# Patient Record
Sex: FEMALE | Race: WHITE | NOT HISPANIC OR LATINO | Employment: UNEMPLOYED | URBAN - METROPOLITAN AREA
[De-identification: names, ages, dates, MRNs, and addresses within clinical notes are randomized per-mention and may not be internally consistent; named-entity substitution may affect disease eponyms.]

---

## 2023-07-24 LAB
ESTRADIOL (PG/ML) IN SER/PLAS: 274 PG/ML
FOLLITROPIN (IU/L) IN SER/PLAS: 8.3 IU/L
THYROTROPIN (MIU/L) IN SER/PLAS BY DETECTION LIMIT <= 0.05 MIU/L: 1.39 MIU/L (ref 0.44–3.98)

## 2023-07-26 LAB
CHLAMYDIA TRACH., AMPLIFIED: NEGATIVE
N. GONORRHEA, AMPLIFIED: NEGATIVE

## 2023-08-07 ENCOUNTER — HOSPITAL ENCOUNTER (OUTPATIENT)
Dept: DATA CONVERSION | Facility: HOSPITAL | Age: 53
End: 2023-08-07
Attending: PHYSICAL MEDICINE & REHABILITATION | Admitting: PHYSICAL MEDICINE & REHABILITATION
Payer: MEDICARE

## 2023-08-07 DIAGNOSIS — M46.1 SACROILIITIS, NOT ELSEWHERE CLASSIFIED (CMS-HCC): ICD-10-CM

## 2023-09-06 ENCOUNTER — HOSPITAL ENCOUNTER (OUTPATIENT)
Dept: DATA CONVERSION | Facility: HOSPITAL | Age: 53
End: 2023-09-06
Attending: RADIOLOGY | Admitting: RADIOLOGY
Payer: MEDICARE

## 2023-09-06 DIAGNOSIS — M47.816 SPONDYLOSIS WITHOUT MYELOPATHY OR RADICULOPATHY, LUMBAR REGION: ICD-10-CM

## 2023-09-06 DIAGNOSIS — J44.9 CHRONIC OBSTRUCTIVE PULMONARY DISEASE, UNSPECIFIED (MULTI): ICD-10-CM

## 2023-09-06 DIAGNOSIS — F17.200 NICOTINE DEPENDENCE, UNSPECIFIED, UNCOMPLICATED: ICD-10-CM

## 2023-09-06 DIAGNOSIS — N95.0 POSTMENOPAUSAL BLEEDING: ICD-10-CM

## 2023-09-06 DIAGNOSIS — F43.10 POST-TRAUMATIC STRESS DISORDER, UNSPECIFIED: ICD-10-CM

## 2023-09-06 DIAGNOSIS — N39.3 STRESS INCONTINENCE (FEMALE) (MALE): ICD-10-CM

## 2023-09-06 DIAGNOSIS — Z12.4 ENCOUNTER FOR SCREENING FOR MALIGNANT NEOPLASM OF CERVIX: ICD-10-CM

## 2023-09-27 ENCOUNTER — HOSPITAL ENCOUNTER (OUTPATIENT)
Dept: DATA CONVERSION | Facility: HOSPITAL | Age: 53
End: 2023-09-27
Attending: PHYSICAL MEDICINE & REHABILITATION | Admitting: PHYSICAL MEDICINE & REHABILITATION
Payer: MEDICARE

## 2023-09-27 DIAGNOSIS — M54.12 RADICULOPATHY, CERVICAL REGION: ICD-10-CM

## 2023-09-29 VITALS — BODY MASS INDEX: 25.74 KG/M2 | WEIGHT: 145.28 LBS | HEIGHT: 63 IN

## 2023-09-29 VITALS — BODY MASS INDEX: 25.74 KG/M2 | HEIGHT: 63 IN | WEIGHT: 145.28 LBS

## 2023-09-30 NOTE — H&P
"    History & Physical Reviewed:   Pregnant/Lactating:  ·  Are You Pregnant no (1)   ·  Are You Currently Breastfeeding no (1)     I have reviewed the History and Physical dated:  27-Sep-2023   History and Physical reviewed and relevant findings noted. Patient examined to review pertinent physical  findings.: No significant changes   Home Medications Reviewed: no changes noted   Allergies Reviewed: no changes noted       ERAS (Enhanced Recovery After Surgery):  ·  ERAS Patient: no     Consent:   COVID-19 Consent:  ·  COVID-19 Risk Consent Surgeon has reviewed key risks related to the risk of itzel COVID-19 and if they contract COVID-19 what the risks are.       Electronic Signatures:  Daniele Elder)  (Signed 27-Sep-2023 09:25)   Authored: History & Physical Reviewed, ERAS, Consent,  Note Completion      Last Updated: 27-Sep-2023 09:25 by Daniele Elder)    References:  1.  Data Referenced From \"Patient Profile - Preop v3\" 26-Sep-2023 13:12   "

## 2023-09-30 NOTE — H&P
"    History & Physical Reviewed:   Pregnant/Lactating:  ·  Are You Pregnant no (1)   ·  Are You Currently Breastfeeding no (1)     I have reviewed the History and Physical dated:  07-Aug-2023   History and Physical reviewed and relevant findings noted. Patient examined to review pertinent physical  findings.: No significant changes   Home Medications Reviewed: no changes noted   Allergies Reviewed: no changes noted       ERAS (Enhanced Recovery After Surgery):  ·  ERAS Patient: no     Consent:   COVID-19 Consent:  ·  COVID-19 Risk Consent Surgeon has reviewed key risks related to the risk of itzel COVID-19 and if they contract COVID-19 what the risks are.       Electronic Signatures:  Daniele Elder)  (Signed 07-Aug-2023 10:32)   Authored: History & Physical Reviewed, ERAS, Consent,  Note Completion      Last Updated: 07-Aug-2023 10:32 by Daniele Elder)    References:  1.  Data Referenced From \"Patient Profile - Preop v3\" 07-Aug-2023 10:04   "

## 2023-10-01 PROBLEM — M47.812 CERVICAL SPONDYLOSIS WITHOUT MYELOPATHY: Status: ACTIVE | Noted: 2023-10-01

## 2023-10-01 PROBLEM — R73.03 PREDIABETES: Status: ACTIVE | Noted: 2023-10-01

## 2023-10-01 PROBLEM — N95.1 PERIMENOPAUSE: Status: ACTIVE | Noted: 2023-10-01

## 2023-10-01 PROBLEM — M47.816 LUMBAR SPONDYLOSIS: Status: ACTIVE | Noted: 2023-10-01

## 2023-10-01 PROBLEM — R68.89 RECENT CHANGE IN WEIGHT: Status: ACTIVE | Noted: 2023-10-01

## 2023-10-01 PROBLEM — M76.62 ACHILLES TENDINITIS OF LEFT LOWER EXTREMITY: Status: ACTIVE | Noted: 2023-10-01

## 2023-10-01 PROBLEM — J44.9 COPD, MILD (MULTI): Status: ACTIVE | Noted: 2023-10-01

## 2023-10-01 PROBLEM — M51.37 DEGENERATION OF INTERVERTEBRAL DISC OF LUMBOSACRAL REGION: Status: ACTIVE | Noted: 2023-10-01

## 2023-10-01 PROBLEM — M79.18 MYOFASCIAL PAIN SYNDROME OF LUMBAR SPINE: Status: ACTIVE | Noted: 2023-10-01

## 2023-10-01 PROBLEM — N89.8 VAGINAL LESION: Status: ACTIVE | Noted: 2023-10-01

## 2023-10-01 PROBLEM — M51.379 DEGENERATION OF INTERVERTEBRAL DISC OF LUMBOSACRAL REGION: Status: ACTIVE | Noted: 2023-10-01

## 2023-10-01 PROBLEM — R05.3 CHRONIC COUGH: Status: ACTIVE | Noted: 2023-10-01

## 2023-10-01 PROBLEM — J38.3 LESION OF VOCAL CORD: Status: ACTIVE | Noted: 2023-10-01

## 2023-10-01 PROBLEM — M50.222 HERNIATED NUCLEUS PULPOSUS, C5-6: Status: ACTIVE | Noted: 2023-10-01

## 2023-10-01 PROBLEM — N92.6 MENSES, IRREGULAR: Status: ACTIVE | Noted: 2023-10-01

## 2023-10-01 PROBLEM — F43.10 PTSD (POST-TRAUMATIC STRESS DISORDER): Status: ACTIVE | Noted: 2023-10-01

## 2023-10-01 PROBLEM — M54.16 RIGHT LUMBAR RADICULITIS: Status: ACTIVE | Noted: 2023-10-01

## 2023-10-01 PROBLEM — M54.12 RIGHT CERVICAL RADICULOPATHY: Status: ACTIVE | Noted: 2023-10-01

## 2023-10-01 PROBLEM — N95.2 VAGINAL ATROPHY: Status: ACTIVE | Noted: 2023-10-01

## 2023-10-01 PROBLEM — N90.89 VULVAR MASS: Status: ACTIVE | Noted: 2023-10-01

## 2023-10-01 PROBLEM — N39.3 SUI (STRESS URINARY INCONTINENCE, FEMALE): Status: ACTIVE | Noted: 2023-10-01

## 2023-10-01 PROBLEM — D64.9 MILD ANEMIA: Status: ACTIVE | Noted: 2023-10-01

## 2023-10-01 PROBLEM — N94.19 FUNCTIONAL DYSPAREUNIA: Status: ACTIVE | Noted: 2023-10-01

## 2023-10-01 PROBLEM — M50.222 OTHER CERVICAL DISC DISPLACEMENT AT C5-C6 LEVEL: Status: ACTIVE | Noted: 2023-10-01

## 2023-10-01 PROBLEM — J38.01: Status: ACTIVE | Noted: 2023-10-01

## 2023-10-01 PROBLEM — D21.9 FIBROIDS: Status: ACTIVE | Noted: 2023-10-01

## 2023-10-01 PROBLEM — E66.3 OVERWEIGHT (BMI 25.0-29.9): Status: ACTIVE | Noted: 2023-10-01

## 2023-10-01 PROBLEM — M46.1 INFLAMMATION OF RIGHT SACROILIAC JOINT (CMS-HCC): Status: ACTIVE | Noted: 2023-10-01

## 2023-10-01 PROBLEM — S19.83XA: Status: ACTIVE | Noted: 2023-10-01

## 2023-10-01 PROBLEM — F17.200 SMOKING ADDICTION: Status: ACTIVE | Noted: 2023-10-01

## 2023-10-01 PROBLEM — R49.0 VOICE HOARSENESS: Status: ACTIVE | Noted: 2023-10-01

## 2023-10-01 PROBLEM — F32.A DEPRESSION: Status: ACTIVE | Noted: 2023-10-01

## 2023-10-01 PROBLEM — R20.0 NUMBNESS OF FINGERS OF BOTH HANDS: Status: ACTIVE | Noted: 2023-10-01

## 2023-10-01 PROBLEM — R19.00 PELVIC MASS IN FEMALE: Status: ACTIVE | Noted: 2023-10-01

## 2023-10-01 PROBLEM — R06.02 SHORTNESS OF BREATH: Status: ACTIVE | Noted: 2023-10-01

## 2023-10-01 RX ORDER — NICOTINE 7MG/24HR
1 PATCH, TRANSDERMAL 24 HOURS TRANSDERMAL DAILY
COMMUNITY
Start: 2021-10-21 | End: 2024-01-08 | Stop reason: WASHOUT

## 2023-10-01 RX ORDER — PRAZOSIN HYDROCHLORIDE 5 MG/1
5 CAPSULE ORAL NIGHTLY
COMMUNITY
Start: 2020-07-07 | End: 2023-12-11 | Stop reason: SDUPTHER

## 2023-10-01 RX ORDER — ESTRADIOL 10 UG/1
10 INSERT VAGINAL NIGHTLY
COMMUNITY
Start: 2023-09-05 | End: 2023-11-20 | Stop reason: SDUPTHER

## 2023-10-01 RX ORDER — VARENICLINE TARTRATE 1 MG/1
TABLET, FILM COATED ORAL
COMMUNITY
End: 2024-01-08 | Stop reason: ALTCHOICE

## 2023-10-01 RX ORDER — TRAMADOL HYDROCHLORIDE 50 MG/1
1 TABLET ORAL 3 TIMES DAILY
COMMUNITY
Start: 2022-07-12 | End: 2024-01-08 | Stop reason: WASHOUT

## 2023-10-01 RX ORDER — OXYBUTYNIN CHLORIDE 5 MG/1
1 TABLET ORAL DAILY
COMMUNITY
Start: 2021-09-09 | End: 2024-04-08 | Stop reason: WASHOUT

## 2023-10-01 RX ORDER — MIRTAZAPINE 45 MG/1
45 TABLET, ORALLY DISINTEGRATING ORAL NIGHTLY
COMMUNITY
End: 2024-01-08 | Stop reason: ALTCHOICE

## 2023-10-01 RX ORDER — NORETHINDRONE 0.35 MG/1
1 TABLET ORAL DAILY
COMMUNITY
Start: 2023-08-10 | End: 2023-11-20 | Stop reason: SDUPTHER

## 2023-10-01 RX ORDER — TRAMADOL HYDROCHLORIDE 50 MG/1
1 TABLET ORAL 2 TIMES DAILY
COMMUNITY
Start: 2022-07-12 | End: 2024-04-17 | Stop reason: SDUPTHER

## 2023-10-01 RX ORDER — DULOXETIN HYDROCHLORIDE 60 MG/1
120 CAPSULE, DELAYED RELEASE ORAL DAILY
COMMUNITY
End: 2023-12-11 | Stop reason: ALTCHOICE

## 2023-10-01 RX ORDER — CLONAZEPAM 1 MG/1
1 TABLET ORAL 2 TIMES DAILY
COMMUNITY
End: 2024-01-15 | Stop reason: SDUPTHER

## 2023-10-01 RX ORDER — NORETHINDRONE 0.35 MG/1
1 TABLET ORAL DAILY
COMMUNITY

## 2023-10-01 RX ORDER — DM/P-EPHED/ACETAMINOPH/DOXYLAM 30-7.5/3
2 LIQUID (ML) ORAL
COMMUNITY
Start: 2022-03-29 | End: 2024-01-08 | Stop reason: WASHOUT

## 2023-10-01 RX ORDER — RISPERIDONE 1 MG/1
1 TABLET ORAL EVERY MORNING
COMMUNITY
End: 2023-12-11

## 2023-10-01 RX ORDER — QUETIAPINE FUMARATE 200 MG/1
1 TABLET, FILM COATED ORAL NIGHTLY
COMMUNITY
Start: 2021-07-27 | End: 2023-12-11 | Stop reason: SDUPTHER

## 2023-10-01 RX ORDER — MINOXIDIL 2.5 MG/1
0.25 TABLET ORAL DAILY
COMMUNITY
Start: 2023-06-24 | End: 2024-01-15 | Stop reason: ALTCHOICE

## 2023-10-01 RX ORDER — OSPEMIFENE 60 MG/1
1 TABLET, FILM COATED ORAL
COMMUNITY
Start: 2023-08-22

## 2023-10-01 RX ORDER — IBUPROFEN 600 MG/1
600 TABLET ORAL 3 TIMES DAILY PRN
COMMUNITY
Start: 2023-08-11 | End: 2024-03-28 | Stop reason: WASHOUT

## 2023-10-01 RX ORDER — NAPROXEN 500 MG/1
500 TABLET ORAL EVERY 12 HOURS PRN
COMMUNITY
Start: 2022-09-19 | End: 2024-01-08 | Stop reason: WASHOUT

## 2023-10-01 RX ORDER — TIZANIDINE 2 MG/1
2 TABLET ORAL 3 TIMES DAILY
COMMUNITY

## 2023-10-01 RX ORDER — ALPRAZOLAM 0.5 MG/1
0.5 TABLET ORAL EVERY 8 HOURS PRN
COMMUNITY
End: 2023-12-11 | Stop reason: ALTCHOICE

## 2023-10-03 ENCOUNTER — TELEMEDICINE (OUTPATIENT)
Dept: BEHAVIORAL HEALTH | Facility: CLINIC | Age: 53
End: 2023-10-03
Payer: MEDICARE

## 2023-10-03 DIAGNOSIS — F43.10 PTSD (POST-TRAUMATIC STRESS DISORDER): ICD-10-CM

## 2023-10-03 PROCEDURE — 99213 OFFICE O/P EST LOW 20 MIN: CPT | Performed by: PSYCHIATRY & NEUROLOGY

## 2023-10-03 ASSESSMENT — ENCOUNTER SYMPTOMS
NERVOUS/ANXIOUS: 1
SLEEP DISTURBANCE: 1
CONFUSION: 1

## 2023-10-03 NOTE — ASSESSMENT & PLAN NOTE
No medication changes.  Refer for cervical ganglion block or ketamine infusions to the [facility].  Phone #[phone]

## 2023-10-03 NOTE — PROGRESS NOTES
Subjective   Patient ID: Suyapa Thao is a 52 y.o. female who presents for No chief complaint on file..  HPI patient seen interval psych history reviewed.  Patient therapist sent me a email indicating that the patient is making progress.  She tried drinking the alprazolam once but then could not sleep the entire night because she could not take the Klonopin at the same time.  Patient asking about Ambien and instead of Klonopin I told her it will not work especially without her taking the Klonopin.  Talked about 2 possible procedures that could help her with her PTSD and pain 1 is a cervical ganglion block the other 1 is ketamine infusion.  Patient does not want to get a cervical ganglion block if it is anywhere near the vocal cords in terms of injection.  She would be willing to do it if the injection was more towards the the spine.  She is also interested in the ketamine infusion.    Review of Systems   Psychiatric/Behavioral:  Positive for confusion and sleep disturbance. The patient is nervous/anxious.        Objective   Physical Exam  Psychiatric:         Attention and Perception: Attention and perception normal.         Mood and Affect: Mood is anxious and depressed.         Behavior: Behavior is agitated. Behavior is cooperative.         Thought Content: Thought content is paranoid.         Cognition and Memory: Memory is impaired.         Judgment: Judgment normal.         Assessment/Plan   Problem List Items Addressed This Visit             ICD-10-CM    PTSD (post-traumatic stress disorder) F43.10     No medication changes.  Refer for cervical ganglion block or ketamine infusions to the [facility].  Phone #[phone]

## 2023-10-09 ENCOUNTER — TELEPHONE (OUTPATIENT)
Dept: OBSTETRICS AND GYNECOLOGY | Facility: CLINIC | Age: 53
End: 2023-10-09
Payer: MEDICARE

## 2023-10-09 NOTE — TELEPHONE ENCOUNTER
Patient calling for results of MRI testing stating it showed a uterine adenomyosis not a fibroid. States she was sent to interventional radiology for a fibroid but they couldn't do anything for her since it was adenomyosis. She is wondering what to do now? She has appointment with you November 20, 2023. Patient states she is still having a lot of cramping interfering with her daily activities and sleep. Please advise.

## 2023-10-09 NOTE — TELEPHONE ENCOUNTER
Message sent to patient through [employer] regarding Dr. Ontiveros's recommendations to discuss possible hysterectomy at her next office visit and to take 800mg ibuprofen as needed for cramping.

## 2023-11-19 PROBLEM — R20.8: Status: ACTIVE | Noted: 2023-10-01

## 2023-11-19 PROBLEM — D18.1 LYMPHANGIOMA, ANY SITE: Status: ACTIVE | Noted: 2023-11-19

## 2023-11-19 PROBLEM — N80.03 ADENOMYOSIS OF UTERUS: Status: ACTIVE | Noted: 2023-11-19

## 2023-11-19 NOTE — PROGRESS NOTES
Subjective   Patient ID: Suyapa Thao is a 52 y.o. female who presents for No chief complaint on file..  HPI52 y.o.on Minipill and vagifem since 8/2023 here for follow up.  She had complaints of lack of sensation during sex.  She also was told she has uterine fibroid but a recent MRI in 9/2023 was positive for adenomyosis and vulvar changes suggestive of lymphangioma or lymphatic malformation.  She is here in saying that the Vagifem is not working she continues to have severe vaginal dryness.  She says that she has not had a cycle this  past month and she continues to take the minipill and feels this is probably working for her.  She states she is worried about getting cancer due to her smoking of the fibroid that she has but I review her MRI that did not show fibroid just adenomyosis which are benign condition.  However I told her it is very important to quit smoking because smoking is associated with other cancers such as brain breast and cervical cancer.    Review of Systems   All other systems reviewed and are negative.      Objective   Physical Exam  Constitutional:       Appearance: Normal appearance.   HENT:      Head: Normocephalic.   Pulmonary:      Effort: Pulmonary effort is normal.   Neurological:      Mental Status: She is alert.   Psychiatric:      Comments: Seems agitated         Assessment/Plan   Problem List Items Addressed This Visit             ICD-10-CM    Diminished sensation due to laxity of vagina - Primary N89.8, R20.8    Adenomyosis of uterus N80.03    Lymphangioma, any site D18.1     Today once again we will review the plan.  For her.  She will continue with the minipill if she has.  Under heavy then she wants to proceed with Mirena IUD insertion.  A pamphlet about IUD was given to her today.  As far as vaginal dryness I discussed with her to use the Vagifem every night for a week or 2 and then twice a week also added Intrarosa to be used every night denies that she is not using Vagifem.   Follow-up with me as needed or in few months it can be a telehealth.

## 2023-11-20 ENCOUNTER — OFFICE VISIT (OUTPATIENT)
Dept: OBSTETRICS AND GYNECOLOGY | Facility: CLINIC | Age: 53
End: 2023-11-20
Payer: MEDICARE

## 2023-11-20 VITALS
SYSTOLIC BLOOD PRESSURE: 98 MMHG | WEIGHT: 143 LBS | HEIGHT: 64 IN | DIASTOLIC BLOOD PRESSURE: 70 MMHG | BODY MASS INDEX: 24.41 KG/M2

## 2023-11-20 DIAGNOSIS — N80.03 ADENOMYOSIS OF UTERUS: ICD-10-CM

## 2023-11-20 DIAGNOSIS — Z30.41 ENCOUNTER FOR SURVEILLANCE OF CONTRACEPTIVE PILLS: ICD-10-CM

## 2023-11-20 DIAGNOSIS — N89.8 DIMINISHED SENSATION DUE TO LAXITY OF VAGINA: Primary | ICD-10-CM

## 2023-11-20 DIAGNOSIS — R20.8 DIMINISHED SENSATION DUE TO LAXITY OF VAGINA: Primary | ICD-10-CM

## 2023-11-20 DIAGNOSIS — D18.1 LYMPHANGIOMA, ANY SITE: ICD-10-CM

## 2023-11-20 DIAGNOSIS — N89.8 VAGINAL DRYNESS: ICD-10-CM

## 2023-11-20 PROCEDURE — 99213 OFFICE O/P EST LOW 20 MIN: CPT | Performed by: OBSTETRICS & GYNECOLOGY

## 2023-11-20 RX ORDER — ESTRADIOL 10 UG/1
10 INSERT VAGINAL NIGHTLY
Qty: 14 TABLET | Refills: 3 | Status: SHIPPED | OUTPATIENT
Start: 2023-11-20

## 2023-11-20 RX ORDER — PRASTERONE 6.5 MG/1
6.5 INSERT VAGINAL DAILY
Qty: 30 EACH | Refills: 3 | Status: SHIPPED | OUTPATIENT
Start: 2023-11-20

## 2023-11-20 RX ORDER — NORETHINDRONE 0.35 MG/1
1 TABLET ORAL DAILY
Qty: 28 TABLET | Refills: 12 | Status: SHIPPED | OUTPATIENT
Start: 2023-11-20 | End: 2024-10-21

## 2023-12-04 ENCOUNTER — APPOINTMENT (OUTPATIENT)
Dept: OTOLARYNGOLOGY | Facility: CLINIC | Age: 53
End: 2023-12-04
Payer: MEDICARE

## 2023-12-10 DIAGNOSIS — F43.10 PTSD (POST-TRAUMATIC STRESS DISORDER): ICD-10-CM

## 2023-12-11 ENCOUNTER — TELEMEDICINE (OUTPATIENT)
Dept: BEHAVIORAL HEALTH | Facility: CLINIC | Age: 53
End: 2023-12-11
Payer: MEDICARE

## 2023-12-11 DIAGNOSIS — F43.10 PTSD (POST-TRAUMATIC STRESS DISORDER): ICD-10-CM

## 2023-12-11 PROCEDURE — 99213 OFFICE O/P EST LOW 20 MIN: CPT | Performed by: PSYCHIATRY & NEUROLOGY

## 2023-12-11 RX ORDER — QUETIAPINE FUMARATE 200 MG/1
200 TABLET, FILM COATED ORAL NIGHTLY
Qty: 90 TABLET | Refills: 3 | Status: SHIPPED | OUTPATIENT
Start: 2023-12-11 | End: 2024-12-10

## 2023-12-11 RX ORDER — RISPERIDONE 1 MG/1
TABLET ORAL
Qty: 180 TABLET | Refills: 3 | Status: SHIPPED | OUTPATIENT
Start: 2023-12-11

## 2023-12-11 RX ORDER — ESCITALOPRAM OXALATE 10 MG/1
10 TABLET ORAL DAILY
Qty: 30 TABLET | Refills: 11 | Status: SHIPPED | OUTPATIENT
Start: 2023-12-11 | End: 2024-05-14 | Stop reason: ALTCHOICE

## 2023-12-11 RX ORDER — PRAZOSIN HYDROCHLORIDE 5 MG/1
5 CAPSULE ORAL NIGHTLY
Qty: 90 CAPSULE | Refills: 3 | Status: SHIPPED | OUTPATIENT
Start: 2023-12-11 | End: 2024-12-10

## 2023-12-11 ASSESSMENT — ENCOUNTER SYMPTOMS
DYSPHORIC MOOD: 1
NERVOUS/ANXIOUS: 1
HALLUCINATIONS: 1
SLEEP DISTURBANCE: 1

## 2023-12-11 NOTE — ASSESSMENT & PLAN NOTE
Transition patient over to Lexapro from Cymbalta.  Wean off Cymbalta while starting Lexapro Lexapro dose will be 10 mg.  Follow-up 4 weeks

## 2023-12-11 NOTE — PROGRESS NOTES
Subjective   Patient ID: Suyapa Thao is a 53 y.o. female who presents for medication management follow-up.  HPI patient reports feeling like she was doing better on Lexapro when it came to panic attacks.  Wants to switch from Cymbalta to Lexapro.  Patient needed refills on 4 of her medications for PTSD Risperdal, Seroquel, prazosin, and Lexapro.  Patient has been getting triggered by watching the is real conflict and is reminding her of events in [place].    Review of Systems   Psychiatric/Behavioral:  Positive for dysphoric mood, hallucinations and sleep disturbance. The patient is nervous/anxious.        Objective   Physical Exam  Psychiatric:         Attention and Perception: Attention and perception normal.         Mood and Affect: Mood is anxious and depressed. Affect is flat.         Speech: Speech is delayed.         Behavior: Behavior is withdrawn.         Thought Content: Thought content is paranoid.         Cognition and Memory: Cognition is impaired. Memory is impaired.         Assessment/Plan   Problem List Items Addressed This Visit             ICD-10-CM    PTSD (post-traumatic stress disorder) F43.10     Transition patient over to Lexapro from Cymbalta.  Wean off Cymbalta while starting Lexapro Lexapro dose will be 10 mg.  Follow-up 4 weeks         Relevant Medications    QUEtiapine (SEROquel) 200 mg tablet    prazosin (Minipress) 5 mg capsule    escitalopram (Lexapro) 10 mg tablet            Alverto Griffin MD 12/11/23 6:23 PM

## 2023-12-28 DIAGNOSIS — F43.10 PTSD (POST-TRAUMATIC STRESS DISORDER): ICD-10-CM

## 2023-12-29 RX ORDER — PRAZOSIN HYDROCHLORIDE 5 MG/1
CAPSULE ORAL
Qty: 90 CAPSULE | Refills: 3 | OUTPATIENT
Start: 2023-12-29

## 2024-01-04 DIAGNOSIS — F43.10 PTSD (POST-TRAUMATIC STRESS DISORDER): ICD-10-CM

## 2024-01-05 ENCOUNTER — NURSE ONLY (OUTPATIENT)
Dept: BEHAVIORAL HEALTH | Facility: CLINIC | Age: 54
End: 2024-01-05
Payer: MEDICARE

## 2024-01-08 ENCOUNTER — OFFICE VISIT (OUTPATIENT)
Dept: PRIMARY CARE | Facility: CLINIC | Age: 54
End: 2024-01-08
Payer: MEDICARE

## 2024-01-08 VITALS
OXYGEN SATURATION: 98 % | RESPIRATION RATE: 16 BRPM | SYSTOLIC BLOOD PRESSURE: 122 MMHG | TEMPERATURE: 97.7 F | HEART RATE: 82 BPM | DIASTOLIC BLOOD PRESSURE: 80 MMHG | BODY MASS INDEX: 24.72 KG/M2 | HEIGHT: 64 IN | WEIGHT: 144.8 LBS

## 2024-01-08 DIAGNOSIS — R73.03 PREDIABETES: ICD-10-CM

## 2024-01-08 DIAGNOSIS — H91.90 SUBJECTIVE HEARING LOSS: ICD-10-CM

## 2024-01-08 DIAGNOSIS — M46.1 SACROILIITIS, NOT ELSEWHERE CLASSIFIED (CMS-HCC): ICD-10-CM

## 2024-01-08 DIAGNOSIS — Z12.11 COLON CANCER SCREENING: ICD-10-CM

## 2024-01-08 DIAGNOSIS — F32.A DEPRESSION, UNSPECIFIED DEPRESSION TYPE: ICD-10-CM

## 2024-01-08 DIAGNOSIS — Z13.6 ENCOUNTER FOR LIPID SCREENING FOR CARDIOVASCULAR DISEASE: ICD-10-CM

## 2024-01-08 DIAGNOSIS — Z00.00 ROUTINE GENERAL MEDICAL EXAMINATION AT HEALTH CARE FACILITY: Primary | ICD-10-CM

## 2024-01-08 DIAGNOSIS — J44.9 COPD, MILD (MULTI): ICD-10-CM

## 2024-01-08 DIAGNOSIS — F17.200 SMOKING ADDICTION: ICD-10-CM

## 2024-01-08 DIAGNOSIS — Z13.220 ENCOUNTER FOR LIPID SCREENING FOR CARDIOVASCULAR DISEASE: ICD-10-CM

## 2024-01-08 DIAGNOSIS — K21.9 LPRD (LARYNGOPHARYNGEAL REFLUX DISEASE): ICD-10-CM

## 2024-01-08 DIAGNOSIS — F43.10 PTSD (POST-TRAUMATIC STRESS DISORDER): ICD-10-CM

## 2024-01-08 DIAGNOSIS — J38.01 COMPLETE PARALYSIS OF LEFT VOCAL CORD: ICD-10-CM

## 2024-01-08 DIAGNOSIS — M81.0 PERIMENOPAUSAL BONE LOSS: ICD-10-CM

## 2024-01-08 DIAGNOSIS — M50.222 OTHER CERVICAL DISC DISPLACEMENT AT C5-C6 LEVEL: ICD-10-CM

## 2024-01-08 DIAGNOSIS — N80.03 ADENOMYOSIS OF UTERUS: ICD-10-CM

## 2024-01-08 PROBLEM — M54.2 NECK PAIN: Status: ACTIVE | Noted: 2021-10-22

## 2024-01-08 PROCEDURE — 4004F PT TOBACCO SCREEN RCVD TLK: CPT | Performed by: INTERNAL MEDICINE

## 2024-01-08 PROCEDURE — 99215 OFFICE O/P EST HI 40 MIN: CPT | Performed by: INTERNAL MEDICINE

## 2024-01-08 PROCEDURE — G0439 PPPS, SUBSEQ VISIT: HCPCS | Performed by: INTERNAL MEDICINE

## 2024-01-08 RX ORDER — QUETIAPINE FUMARATE 200 MG/1
200 TABLET, FILM COATED ORAL NIGHTLY
Qty: 90 TABLET | Refills: 3 | OUTPATIENT
Start: 2024-01-08

## 2024-01-08 RX ORDER — ACETAMINOPHEN 500 MG
1000 TABLET ORAL EVERY 6 HOURS PRN
COMMUNITY
Start: 2022-06-09 | End: 2024-01-08 | Stop reason: WASHOUT

## 2024-01-08 RX ORDER — OMEPRAZOLE 40 MG/1
40 CAPSULE, DELAYED RELEASE ORAL
COMMUNITY
Start: 2015-04-13 | End: 2024-04-11 | Stop reason: WASHOUT

## 2024-01-08 SDOH — ECONOMIC STABILITY: FOOD INSECURITY: WITHIN THE PAST 12 MONTHS, YOU WORRIED THAT YOUR FOOD WOULD RUN OUT BEFORE YOU GOT MONEY TO BUY MORE.: NEVER TRUE

## 2024-01-08 SDOH — ECONOMIC STABILITY: FOOD INSECURITY: WITHIN THE PAST 12 MONTHS, THE FOOD YOU BOUGHT JUST DIDN'T LAST AND YOU DIDN'T HAVE MONEY TO GET MORE.: NEVER TRUE

## 2024-01-08 ASSESSMENT — ACTIVITIES OF DAILY LIVING (ADL)
BATHING: INDEPENDENT
DRESSING: INDEPENDENT
TAKING_MEDICATION: INDEPENDENT
GROCERY_SHOPPING: INDEPENDENT
DOING_HOUSEWORK: INDEPENDENT
MANAGING_FINANCES: INDEPENDENT

## 2024-01-08 ASSESSMENT — PATIENT HEALTH QUESTIONNAIRE - PHQ9
9. THOUGHTS THAT YOU WOULD BE BETTER OFF DEAD, OR OF HURTING YOURSELF: NOT AT ALL
3. TROUBLE FALLING OR STAYING ASLEEP: NEARLY EVERY DAY
SUM OF ALL RESPONSES TO PHQ QUESTIONS 1-9: 12
8. MOVING OR SPEAKING SO SLOWLY THAT OTHER PEOPLE COULD HAVE NOTICED. OR THE OPPOSITE, BEING SO FIGETY OR RESTLESS THAT YOU HAVE BEEN MOVING AROUND A LOT MORE THAN USUAL: NOT AT ALL
4. FEELING TIRED OR HAVING LITTLE ENERGY: NEARLY EVERY DAY
5. POOR APPETITE OR OVEREATING: MORE THAN HALF THE DAYS
2. FEELING DOWN, DEPRESSED OR HOPELESS: NEARLY EVERY DAY
1. LITTLE INTEREST OR PLEASURE IN DOING THINGS: NOT AT ALL
10. IF YOU CHECKED OFF ANY PROBLEMS, HOW DIFFICULT HAVE THESE PROBLEMS MADE IT FOR YOU TO DO YOUR WORK, TAKE CARE OF THINGS AT HOME, OR GET ALONG WITH OTHER PEOPLE: VERY DIFFICULT
7. TROUBLE CONCENTRATING ON THINGS, SUCH AS READING THE NEWSPAPER OR WATCHING TELEVISION: SEVERAL DAYS
6. FEELING BAD ABOUT YOURSELF - OR THAT YOU ARE A FAILURE OR HAVE LET YOURSELF OR YOUR FAMILY DOWN: NOT AT ALL
SUM OF ALL RESPONSES TO PHQ9 QUESTIONS 1 & 2: 3

## 2024-01-08 ASSESSMENT — LIFESTYLE VARIABLES
AUDIT-C TOTAL SCORE: 0
HOW MANY STANDARD DRINKS CONTAINING ALCOHOL DO YOU HAVE ON A TYPICAL DAY: PATIENT DOES NOT DRINK
HOW OFTEN DO YOU HAVE A DRINK CONTAINING ALCOHOL: NEVER
HOW OFTEN DO YOU HAVE SIX OR MORE DRINKS ON ONE OCCASION: NEVER
SKIP TO QUESTIONS 9-10: 1

## 2024-01-08 ASSESSMENT — ENCOUNTER SYMPTOMS
OCCASIONAL FEELINGS OF UNSTEADINESS: 0
LOSS OF SENSATION IN FEET: 0
DEPRESSION: 1

## 2024-01-08 NOTE — ASSESSMENT & PLAN NOTE
Patient is concerned about multiple options for treatment. Refer to gyn for treatment options. Patient is concerned that she has been given multiple treatment options

## 2024-01-08 NOTE — ASSESSMENT & PLAN NOTE
Patient has chronic pain issues in the neck and back, patient apparently was advised to try Ketamine. I think that the patient's best option would be further evaluation. Patient was referred to cervical ganglion block to help with PTSD symptoms

## 2024-01-08 NOTE — PROGRESS NOTES
Subjective   Reason for Visit: Suyapa Thao is an 53 y.o. female here for a Medicare Wellness visit.     Past Medical, Surgical, and Family History reviewed and updated in chart.    Reviewed all medications by prescribing practitioner or clinical pharmacist (such as prescriptions, OTCs, herbal therapies and supplements) and documented in the medical record.    HPI    Follow up and Medicare wellness exam: Patient would like to have colonoscopy completed, she appears to be up to date on mammograms. She does decline flu vaccine      c/o numbness of hands and feet.      vocal cord paralysis: patient had polyps removed, she had vocal cord injection per [facility]. She still has a hoarse voice, she has had injection x 2, she has seen Dr Saunders who offered fat injection to improve closing of vocal cords. Patient has deferred. She also saw [facility] where surgery was also discussed with the patient.       Patient has chronic neck pain, she has gone to [facility] for evaluation of her neck, patient notes that pain radiates to the neck. The patient had an injection that was helpful per pain management      PTSD: patient worked at [employer] as . She was active in [place], [place]. She does see psychiatry, she is disabled due to PTSD symptoms. [place] to [place] after [place]. Patient admits that what is going on in [place] is currently bothering her.       Numbness and pain in the feet and hands: patient has severe pain in the feet, numbness in the hands is noted also . She states that she rarely goes to physician except for current disability issues, She does go to pain management also. Patient gets pain in the knees and the elbows.      Smoking: patient is trying to quit smoking, she now smoking about 7 cigarettes per day, she has smoked since age 18    Adenomyosis of uterus: patient has been offered hysterectomy and other treatments per [facility]     Patient Care Team:  Brennan Kinney MD as PCP - General  Brennan Kinney MD  "as PCP - [employer] PCP     Review of Systems    See HPI: patient has ongoing issues with PTSD and anxiety, she sees psychiatry, she has chronic voice issues.    Objective   Vitals:  /80 (BP Location: Right arm, Patient Position: Sitting, BP Cuff Size: Adult)   Pulse 82   Temp 36.5 °C (97.7 °F)   Resp 16   Ht 1.613 m (5' 3.5\")   Wt 65.7 kg (144 lb 12.8 oz)   SpO2 98%   BMI 25.25 kg/m²       Physical Exam      Constitutional   General appearance: Alert and in no acute distress. well developed, well nourished, within normal limits of ideal weight , accompanied by future sister in law.   Eyes   Inspection of eyes: Sclera and conjunctiva were normal. wearing glasses.   Ears, Nose, Mouth, and Throat   Ears: Auricles: Normal.   Pulmonary   Respiratory assessment: No respiratory distress, normal respiratory rhythm and effort.    Auscultation of lungs: Abnormal.  Auscultation of the lungs revealed decreased breath sounds diffusely. no rales or crackles were heard bilaterally. no rhonchi. wheezing over the left apex and inspiratory wheeze left apex. no bronchial breath sounds.   Cardiovascular   Auscultation of heart: Apical pulse normal, heart rate and rhythm normal, normal S1 and S2, no murmurs and no pericardial rub.    Exam for edema: No peripheral edema.      Lymphatic   Palpation of lymph nodes in neck: No cervical lymphadenopathy.   Musculoskeletal right rodolfo lumbar tenderness noted, hand grasp is 4/5 bilaterally, patient c/o numbness of the fingers.   Neurologic   Cranial nerves: Nerves 2-12 were intact, no focal neuro defects.    Reflexes: Normal.  Deep tendon reflexes: 2+ right patella and 2+ left patella.   Psychiatric   Orientation: Oriented to person, place, and time.    Mood and affect: Normal.     Assessment/Plan   Problem List Items Addressed This Visit       Complete paralysis of left vocal cord    Relevant Orders    Referral to ENT    CBC and Auto Differential    Comprehensive " metabolic panel    COPD, mild (CMS/HCC)    Relevant Orders    CBC and Auto Differential    Comprehensive metabolic panel    Depression    Relevant Orders    CBC and Auto Differential    Comprehensive metabolic panel    TSH with reflex to Free T4 if abnormal    Other cervical disc displacement at C5-C6 level    Current Assessment & Plan     Patient has chronic pain issues in the neck and back, patient apparently was advised to try Ketamine. I think that the patient's best option would be further evaluation. Patient was referred to cervical ganglion block to help with PTSD symptoms           Relevant Orders    Referral to Pain Medicine    Prediabetes    Relevant Orders    CBC and Auto Differential    Comprehensive metabolic panel    Lipid panel    Hemoglobin A1c    PTSD (post-traumatic stress disorder)    Current Assessment & Plan     Follow up with psychiatry as ordered, she takes multiple med therapies          Relevant Orders    CBC and Auto Differential    Comprehensive metabolic panel    TSH with reflex to Free T4 if abnormal    Referral to Pain Medicine    Smoking addiction    Current Assessment & Plan     Encourage the patient to quit smoking         Relevant Orders    CBC and Auto Differential    Comprehensive metabolic panel    XR DEXA bone density    Adenomyosis of uterus    Current Assessment & Plan     Patient is concerned about multiple options for treatment. Refer to gyn for treatment options. Patient is concerned that she has been given multiple treatment options         Relevant Orders    Referral to Gynecology    CBC and Auto Differential    Comprehensive metabolic panel    LPRD (laryngopharyngeal reflux disease)    Relevant Orders    Referral to ENT    CBC and Auto Differential    Comprehensive metabolic panel     Other Visit Diagnoses       Routine general medical examination at health care facility    -  Primary    Relevant Orders    CBC and Auto Differential    Comprehensive metabolic panel    XR  DEXA bone density    Sacroiliitis, not elsewhere classified (CMS/HCC)        Relevant Orders    CBC and Auto Differential    Comprehensive metabolic panel    Referral to Pain Medicine    Colon cancer screening        Relevant Orders    Colonoscopy Screening; Average Risk Patient    CBC and Auto Differential    Comprehensive metabolic panel    Encounter for lipid screening for cardiovascular disease        Relevant Orders    Lipid panel    Perimenopausal bone loss        Relevant Orders    XR DEXA bone density    Subjective hearing loss        Relevant Orders    Referral to Audiology          Refer to gyn for 2nd opinion, refer to ENT for 2nd opinion, patient declines flu vaccine, encourage smoking cessation, check labs ordered. Pain management referral to discuss options    Follow up in 3 months after referrals are completed.

## 2024-01-10 ENCOUNTER — TELEPHONE (OUTPATIENT)
Dept: GASTROENTEROLOGY | Facility: CLINIC | Age: 54
End: 2024-01-10
Payer: MEDICARE

## 2024-01-10 NOTE — TELEPHONE ENCOUNTER
----- Message from Earline Hoffmann MA sent at 1/10/2024 10:43 AM EST -----  Regarding: RE: Colonoscopy screening  [facility] COLONOSCOPY 1.23.24  ENDO  DR. WALL  MIRALAX  PACKET MAILED WITH STAMPS 1.10.24  ----- Message -----  From: Jane Lindsey RN  Sent: 1/9/2024   2:20 PM EST  To: Do [id number] Clerical  Subject: Colonoscopy screening                            Open access, Per Catrachita

## 2024-01-15 ENCOUNTER — TELEMEDICINE (OUTPATIENT)
Dept: BEHAVIORAL HEALTH | Facility: CLINIC | Age: 54
End: 2024-01-15
Payer: MEDICARE

## 2024-01-15 DIAGNOSIS — F43.10 PTSD (POST-TRAUMATIC STRESS DISORDER): ICD-10-CM

## 2024-01-15 PROCEDURE — 99214 OFFICE O/P EST MOD 30 MIN: CPT | Performed by: PSYCHIATRY & NEUROLOGY

## 2024-01-15 RX ORDER — ESCITALOPRAM OXALATE 20 MG/1
20 TABLET ORAL DAILY
Qty: 90 TABLET | Refills: 3 | Status: SHIPPED | OUTPATIENT
Start: 2024-01-15 | End: 2024-05-14 | Stop reason: SDUPTHER

## 2024-01-15 RX ORDER — CLONAZEPAM 1 MG/1
1 TABLET ORAL 2 TIMES DAILY
Qty: 180 TABLET | Refills: 0 | Status: SHIPPED | OUTPATIENT
Start: 2024-01-15 | End: 2024-03-19 | Stop reason: SDUPTHER

## 2024-01-15 NOTE — ASSESSMENT & PLAN NOTE
We urged patient to inquire with her pain management specialist about possible ketamine infusions for dual purpose.  Told patient about possible studies involving psychotherapy plus MDMA for PTSD.  Increase Lexapro to 20 mg daily.

## 2024-01-15 NOTE — PROGRESS NOTES
Subjective   Patient ID: Suyapa Thao is a 53 y.o. female who presents for medication management for posttraumatic stress disorder severe..  HPI patient seen interval psych history reviewed.  Ketamine treatments were not approved by [facility] because the patient has Workmen's Comp.  Urged patient to see if pain management can help her get ketamine treatments for her pain which may have beneficial effects also on her PTSD.  Currently the patient is taking her med regimen we went over this includes clonazepam 1 mg twice a day, Seroquel 200 mg at night, Lexapro 10 mg daily, risperidone 1 mg at bedtime.  Watching the news about the [employer] conflict is a trigger for her.  She dreams that she is one of the hostage is and feels helpless about it.  She is taking her prazosin 5 mg at night for nightmares.  And being urged by therapist not to watch news, which engenders a feeling of helplessness.    Review of Systems    Objective   Physical Exam  Psychiatric:         Attention and Perception: Attention and perception normal.         Mood and Affect: Mood is anxious and depressed. Affect is blunt.         Speech: Speech is delayed.         Behavior: Behavior is withdrawn.         Thought Content: Thought content normal.         Cognition and Memory: Cognition and memory normal.         Judgment: Judgment normal.      Comments: Better understanding of med regimen.         Assessment/Plan   Problem List Items Addressed This Visit             ICD-10-CM    PTSD (post-traumatic stress disorder) F43.10     We urged patient to inquire with her pain management specialist about possible ketamine infusions for dual purpose.  Told patient about possible studies involving psychotherapy plus MDMA for PTSD.  Increase Lexapro to 20 mg daily.         Relevant Medications    clonazePAM (KlonoPIN) 1 mg tablet    escitalopram (Lexapro) 20 mg tablet            Alverto Griffin MD 01/15/24 4:54 PM

## 2024-01-24 ENCOUNTER — APPOINTMENT (OUTPATIENT)
Dept: GASTROENTEROLOGY | Facility: HOSPITAL | Age: 54
End: 2024-01-24
Payer: MEDICARE

## 2024-02-01 ENCOUNTER — CLINICAL SUPPORT (OUTPATIENT)
Dept: AUDIOLOGY | Facility: HOSPITAL | Age: 54
End: 2024-02-01
Payer: MEDICARE

## 2024-02-01 DIAGNOSIS — H90.3 SENSORINEURAL HEARING LOSS (SNHL) OF BOTH EARS: Primary | ICD-10-CM

## 2024-02-01 PROCEDURE — 92557 COMPREHENSIVE HEARING TEST: CPT | Performed by: AUDIOLOGIST

## 2024-02-01 PROCEDURE — 92550 TYMPANOMETRY & REFLEX THRESH: CPT | Performed by: AUDIOLOGIST

## 2024-02-01 ASSESSMENT — PAIN SCALES - GENERAL: PAINLEVEL_OUTOF10: 0 - NO PAIN

## 2024-02-01 ASSESSMENT — PAIN - FUNCTIONAL ASSESSMENT: PAIN_FUNCTIONAL_ASSESSMENT: 0-10

## 2024-02-01 NOTE — LETTER
2024     Brennan Kinney MD  [address]  [facility] [id number]    Patient: Suyapa Thao   YOB: 1970   Date of Visit: 2024       Dear Dr. Brennan Kinney MD:    Thank you for referring Suyapa Thao to me for evaluation. Below are my notes for this consultation.  If you have questions, please do not hesitate to call me. I look forward to following your patient along with you.       Sincerely,     YEIMI Hoff, CCC-A      CC: No Recipients  ______________________________________________________________________________________    AUDIOLOGY ADULT AUDIOMETRIC EVALUATION      Name:  Suyapa Thao  :  1970  Age:  53 y.o.  Date of Evaluation:  2024     History:  Reason for visit:  Ms. Thao is seen today at the request of Brennan Kinney MD for an evaluation of hearing.  Patient complains of Hearing Loss (Family is complaining that the television is too loud.  After  return from  deployment as , she felt her hearing was reduced.  )  She was accompanied by her brother today.    Otalgia:  no    Aural Fullness:  no    Otitis Media: no    PE Tubes:  no  Other otologic surgical history:  no    Tinnitus:   infrequently bilateral    Dizziness:   occasionally, attributed to her medications    Noise Exposure: yes,  service [place] with gunfire and some exposure to IEDs - some use of hearing protection devices when possible    Family history of hearing loss:  no    Other significant history:  PTSD diagnosis    Hearing aid history:      none    EVALUATION          RESULTS:    Otoscopic Evaluation:        Right ear: mostly clear ear canal, tympanic membrane visualized        Left ear: mostly clear ear canal, tympanic membrane visualized     Immittance Measures: 226Hz       Right Probe Ear: Tympanogram shows normal middle ear pressure, static compliance, and ear canal volume.  Acoustic reflexes were consistent with pure tone results.        Left Probe Ear: Tympanogram shows normal middle ear pressure, static compliance, and ear canal volume.  Acoustic reflexes were consistent with pure tone results.    Test technique:  Pure Tone Audiometry via insert earphones    Reliability:   good    Pure Tone Audiometry:         Right ear: normal hearing sensitivity from 125-8000Hz, with notch of mild/moderate sensorineural hearing loss at 2000-4000Hz       Left ear: normal hearing sensitivity from 125-8000Hz, with notch of mild/moderate sensorineural hearing loss at 3000-4000Hz    Speech Audiometry:        Right Ear:  Speech Reception Threshold (SRT) was obtained at 20 dBHL                 Word Recognition scores were excellent at 40dBSL re: SRT       Left Ear:  Speech Reception Threshold (SRT) was obtained at 20 dBHL                 Word Recognition scores were excellent at 40dBSL re: SRT    Distortion Product Otoacoustic Emissions:        Right Ear:  present 1500-8000Hz        Left Ear:  present [id number], 6000Hz    absent [id number], 8000Hz  Present OAEs suggest normal cochlear outer hair cell function.  Absent OAEs are consistent with some degree of hearing loss.    IMPRESSIONS:  Today's test results are consistent with mostly normal hearing sensitivity with notch of mild/moderate sensorineural hearing loss around 4000Hz with excellent word discrimination and normal tympanic membrane mobility bilaterally.  Her configuration of hearing loss is consistent with reported noise exposure.  She is not a good candidate for hearing aids at this time, but should monitor her hearing status regularly.      RECOMMENDATIONS:  -Annual audiologic monitoring, or as changes are noted.  -Use of hearing protection devices whenever loud noises cannot be avoided.  Communication strategies to ease listening difficulties were discussed.  For example,   1. Speakers should be in the same room and facing the listener.  2. Speakers should obtain the listener's attention before speaking.     3. Speakers should speak slowly and clearly.  4. Reduce background noises during conversations when possible.    PATIENT EDUCATION:   Discussed results and recommendations with Ms. Thao and her brother.  Questions were addressed and the patient was encouraged to contact our department should concerns arise.    Time:  13:06 to 13:38    YEIMI Hoff, CCC-A  Senior Audiologist

## 2024-02-01 NOTE — PROGRESS NOTES
AUDIOLOGY ADULT AUDIOMETRIC EVALUATION      Name:  Suyapa Thao  :  1970  Age:  53 y.o.  Date of Evaluation:  2024     History:  Reason for visit:  Ms. Thao is seen today at the request of Brennan Kinney MD for an evaluation of hearing.  Patient complains of Hearing Loss (Family is complaining that the television is too loud.  After  return from  deployment as , she felt her hearing was reduced.  )  She was accompanied by her brother today.    Otalgia:  no    Aural Fullness:  no    Otitis Media: no    PE Tubes:  no  Other otologic surgical history:  no    Tinnitus:   infrequently bilateral    Dizziness:   occasionally, attributed to her medications    Noise Exposure: yes,  service [place] with gunfire and some exposure to IEDs - some use of hearing protection devices when possible    Family history of hearing loss:  no    Other significant history:  PTSD diagnosis    Hearing aid history:      none    EVALUATION          RESULTS:    Otoscopic Evaluation:        Right ear: mostly clear ear canal, tympanic membrane visualized        Left ear: mostly clear ear canal, tympanic membrane visualized     Immittance Measures: 226Hz       Right Probe Ear: Tympanogram shows normal middle ear pressure, static compliance, and ear canal volume.  Acoustic reflexes were consistent with pure tone results.       Left Probe Ear: Tympanogram shows normal middle ear pressure, static compliance, and ear canal volume.  Acoustic reflexes were consistent with pure tone results.    Test technique:  Pure Tone Audiometry via insert earphones    Reliability:   good    Pure Tone Audiometry:         Right ear: normal hearing sensitivity from 125-8000Hz, with notch of mild/moderate sensorineural hearing loss at 2000-4000Hz       Left ear: normal hearing sensitivity from 125-8000Hz, with notch of mild/moderate sensorineural hearing loss at 3000-4000Hz    Speech Audiometry:        Right Ear:  Speech  Reception Threshold (SRT) was obtained at 20 dBHL                 Word Recognition scores were excellent at 40dBSL re: SRT       Left Ear:  Speech Reception Threshold (SRT) was obtained at 20 dBHL                 Word Recognition scores were excellent at 40dBSL re: SRT    Distortion Product Otoacoustic Emissions:        Right Ear:  present 1500-8000Hz        Left Ear:  present 3200-9205, 6000Hz    absent 4942-1441, 8000Hz  Present OAEs suggest normal cochlear outer hair cell function.  Absent OAEs are consistent with some degree of hearing loss.    IMPRESSIONS:  Today's test results are consistent with mostly normal hearing sensitivity with notch of mild/moderate sensorineural hearing loss around 4000Hz with excellent word discrimination and normal tympanic membrane mobility bilaterally.  Her configuration of hearing loss is consistent with reported noise exposure.  She is not a good candidate for hearing aids at this time, but should monitor her hearing status regularly.      RECOMMENDATIONS:  -Annual audiologic monitoring, or as changes are noted.  -Use of hearing protection devices whenever loud noises cannot be avoided.  Communication strategies to ease listening difficulties were discussed.  For example,   1. Speakers should be in the same room and facing the listener.  2. Speakers should obtain the listener's attention before speaking.    3. Speakers should speak slowly and clearly.  4. Reduce background noises during conversations when possible.    PATIENT EDUCATION:   Discussed results and recommendations with Ms. Thao and her brother.  Questions were addressed and the patient was encouraged to contact our department should concerns arise.    Time:  13:06 to 13:38    YEIMI Hoff, CCC-A  Senior Audiologist

## 2024-02-05 ENCOUNTER — NURSE ONLY (OUTPATIENT)
Dept: BEHAVIORAL HEALTH | Facility: CLINIC | Age: 54
End: 2024-02-05
Payer: MEDICARE

## 2024-02-05 NOTE — PROGRESS NOTES
Spoke with [facility] pharmacy benefits [place] for patient's workmans compensation. Needing to get approval of clonazepam 1 mg tabs po BID daily, and Escitalopram 20 mg daily.  She is sending to the . Once  approves it will be sent back to [facility] to send to pharmacy [facility].  Response will be within 24-48 hours.

## 2024-02-06 ENCOUNTER — LAB (OUTPATIENT)
Dept: LAB | Facility: LAB | Age: 54
End: 2024-02-06
Payer: MEDICARE

## 2024-02-06 ENCOUNTER — TELEPHONE (OUTPATIENT)
Dept: BEHAVIORAL HEALTH | Facility: CLINIC | Age: 54
End: 2024-02-06

## 2024-02-06 DIAGNOSIS — F43.10 PTSD (POST-TRAUMATIC STRESS DISORDER): ICD-10-CM

## 2024-02-06 DIAGNOSIS — M46.1 SACROILIITIS, NOT ELSEWHERE CLASSIFIED (CMS-HCC): ICD-10-CM

## 2024-02-06 DIAGNOSIS — R73.03 PREDIABETES: ICD-10-CM

## 2024-02-06 DIAGNOSIS — F32.A DEPRESSION, UNSPECIFIED DEPRESSION TYPE: ICD-10-CM

## 2024-02-06 DIAGNOSIS — N80.03 ADENOMYOSIS OF UTERUS: ICD-10-CM

## 2024-02-06 DIAGNOSIS — J38.01 COMPLETE PARALYSIS OF LEFT VOCAL CORD: ICD-10-CM

## 2024-02-06 DIAGNOSIS — J44.9 COPD, MILD (MULTI): ICD-10-CM

## 2024-02-06 DIAGNOSIS — Z13.220 ENCOUNTER FOR LIPID SCREENING FOR CARDIOVASCULAR DISEASE: ICD-10-CM

## 2024-02-06 DIAGNOSIS — Z13.6 ENCOUNTER FOR LIPID SCREENING FOR CARDIOVASCULAR DISEASE: ICD-10-CM

## 2024-02-06 DIAGNOSIS — Z00.00 ROUTINE GENERAL MEDICAL EXAMINATION AT HEALTH CARE FACILITY: ICD-10-CM

## 2024-02-06 DIAGNOSIS — K21.9 LPRD (LARYNGOPHARYNGEAL REFLUX DISEASE): ICD-10-CM

## 2024-02-06 DIAGNOSIS — Z12.11 COLON CANCER SCREENING: ICD-10-CM

## 2024-02-06 DIAGNOSIS — F17.200 SMOKING ADDICTION: ICD-10-CM

## 2024-02-06 LAB
ALBUMIN SERPL BCP-MCNC: 4.3 G/DL (ref 3.4–5)
ALP SERPL-CCNC: 57 U/L (ref 33–110)
ALT SERPL W P-5'-P-CCNC: 9 U/L (ref 7–45)
ANION GAP SERPL CALC-SCNC: 11 MMOL/L (ref 10–20)
AST SERPL W P-5'-P-CCNC: 17 U/L (ref 9–39)
BASOPHILS # BLD AUTO: 0.04 X10*3/UL (ref 0–0.1)
BASOPHILS NFR BLD AUTO: 0.4 %
BILIRUB SERPL-MCNC: 0.4 MG/DL (ref 0–1.2)
BUN SERPL-MCNC: 8 MG/DL (ref 6–23)
CALCIUM SERPL-MCNC: 9.5 MG/DL (ref 8.6–10.3)
CHLORIDE SERPL-SCNC: 106 MMOL/L (ref 98–107)
CHOLEST SERPL-MCNC: 204 MG/DL (ref 0–199)
CHOLESTEROL/HDL RATIO: 3.2
CO2 SERPL-SCNC: 26 MMOL/L (ref 21–32)
CREAT SERPL-MCNC: 0.73 MG/DL (ref 0.5–1.05)
EGFRCR SERPLBLD CKD-EPI 2021: >90 ML/MIN/1.73M*2
EOSINOPHIL # BLD AUTO: 0.09 X10*3/UL (ref 0–0.7)
EOSINOPHIL NFR BLD AUTO: 1 %
ERYTHROCYTE [DISTWIDTH] IN BLOOD BY AUTOMATED COUNT: 15 % (ref 11.5–14.5)
GLUCOSE SERPL-MCNC: 83 MG/DL (ref 74–99)
HCT VFR BLD AUTO: 47.1 % (ref 36–46)
HDLC SERPL-MCNC: 64 MG/DL
HGB BLD-MCNC: 14.4 G/DL (ref 12–16)
IMM GRANULOCYTES # BLD AUTO: 0.03 X10*3/UL (ref 0–0.7)
IMM GRANULOCYTES NFR BLD AUTO: 0.3 % (ref 0–0.9)
LDLC SERPL CALC-MCNC: 126 MG/DL
LYMPHOCYTES # BLD AUTO: 2.54 X10*3/UL (ref 1.2–4.8)
LYMPHOCYTES NFR BLD AUTO: 27.9 %
MCH RBC QN AUTO: 25.1 PG (ref 26–34)
MCHC RBC AUTO-ENTMCNC: 30.6 G/DL (ref 32–36)
MCV RBC AUTO: 82 FL (ref 80–100)
MONOCYTES # BLD AUTO: 0.38 X10*3/UL (ref 0.1–1)
MONOCYTES NFR BLD AUTO: 4.2 %
NEUTROPHILS # BLD AUTO: 6.04 X10*3/UL (ref 1.2–7.7)
NEUTROPHILS NFR BLD AUTO: 66.2 %
NON HDL CHOLESTEROL: 140 MG/DL (ref 0–149)
NRBC BLD-RTO: 0 /100 WBCS (ref 0–0)
PLATELET # BLD AUTO: 351 X10*3/UL (ref 150–450)
POTASSIUM SERPL-SCNC: 4.7 MMOL/L (ref 3.5–5.3)
PROT SERPL-MCNC: 6.9 G/DL (ref 6.4–8.2)
RBC # BLD AUTO: 5.73 X10*6/UL (ref 4–5.2)
SODIUM SERPL-SCNC: 138 MMOL/L (ref 136–145)
TRIGL SERPL-MCNC: 70 MG/DL (ref 0–149)
VLDL: 14 MG/DL (ref 0–40)
WBC # BLD AUTO: 9.1 X10*3/UL (ref 4.4–11.3)

## 2024-02-06 PROCEDURE — 83036 HEMOGLOBIN GLYCOSYLATED A1C: CPT

## 2024-02-06 PROCEDURE — 80061 LIPID PANEL: CPT

## 2024-02-06 PROCEDURE — 85025 COMPLETE CBC W/AUTO DIFF WBC: CPT

## 2024-02-06 PROCEDURE — 80053 COMPREHEN METABOLIC PANEL: CPT

## 2024-02-06 NOTE — PROGRESS NOTES
Patient's script was refilled this morning   Refill for clonazepam. Patient notified this was sent in last month for 180 tablets.

## 2024-02-07 LAB
EST. AVERAGE GLUCOSE BLD GHB EST-MCNC: 117 MG/DL
HBA1C MFR BLD: 5.7 %

## 2024-02-16 ENCOUNTER — HOSPITAL ENCOUNTER (OUTPATIENT)
Dept: RADIOLOGY | Facility: CLINIC | Age: 54
Discharge: HOME | End: 2024-02-16
Payer: MEDICARE

## 2024-02-16 DIAGNOSIS — F17.200 SMOKING ADDICTION: ICD-10-CM

## 2024-02-16 DIAGNOSIS — M81.0 PERIMENOPAUSAL BONE LOSS: ICD-10-CM

## 2024-02-16 DIAGNOSIS — Z00.00 ROUTINE GENERAL MEDICAL EXAMINATION AT HEALTH CARE FACILITY: ICD-10-CM

## 2024-02-16 PROCEDURE — 77080 DXA BONE DENSITY AXIAL: CPT

## 2024-02-16 PROCEDURE — 77080 DXA BONE DENSITY AXIAL: CPT | Performed by: RADIOLOGY

## 2024-02-28 ENCOUNTER — APPOINTMENT (OUTPATIENT)
Dept: OBSTETRICS AND GYNECOLOGY | Facility: CLINIC | Age: 54
End: 2024-02-28
Payer: MEDICARE

## 2024-02-28 ENCOUNTER — TELEPHONE (OUTPATIENT)
Dept: OBSTETRICS AND GYNECOLOGY | Facility: CLINIC | Age: 54
End: 2024-02-28

## 2024-02-28 NOTE — TELEPHONE ENCOUNTER
Patient called had an appointment today (2/28) at 8;40am. Patient thought her appointment was for tmrw morning wants to know when she can come in for this? Please advise.

## 2024-03-12 ENCOUNTER — OFFICE VISIT (OUTPATIENT)
Dept: OTOLARYNGOLOGY | Facility: CLINIC | Age: 54
End: 2024-03-12
Payer: OTHER MISCELLANEOUS

## 2024-03-12 ENCOUNTER — PREP FOR PROCEDURE (OUTPATIENT)
Dept: OTOLARYNGOLOGY | Facility: CLINIC | Age: 54
End: 2024-03-12

## 2024-03-12 VITALS — WEIGHT: 43.6 LBS | BODY MASS INDEX: 7.73 KG/M2 | TEMPERATURE: 97.5 F | HEIGHT: 63 IN

## 2024-03-12 DIAGNOSIS — J38.3 GLOTTIC INSUFFICIENCY: ICD-10-CM

## 2024-03-12 DIAGNOSIS — R49.0 HOARSENESS OF VOICE: Primary | ICD-10-CM

## 2024-03-12 DIAGNOSIS — J38.01 COMPLETE PARALYSIS OF LEFT VOCAL CORD: ICD-10-CM

## 2024-03-12 PROCEDURE — 99214 OFFICE O/P EST MOD 30 MIN: CPT | Performed by: OTOLARYNGOLOGY

## 2024-03-12 PROCEDURE — 31579 LARYNGOSCOPY TELESCOPIC: CPT | Performed by: OTOLARYNGOLOGY

## 2024-03-12 NOTE — H&P
History Of Present Illness  Suyapa Thao is a 53 y.o. female presenting with left greater than right vocal cord weakness with benefit from prior injection.     Past Medical History  She has a past medical history of ADHD (attention deficit hyperactivity disorder), Adjustment disorder, Anxiety, Chronic pain disorder, Depression, Hallucination, Memory loss, Panic attack, Post-traumatic stress disorder, unspecified (08/02/2022), Psychiatric illness, PTSD (post-traumatic stress disorder), and Sleep difficulties.    Surgical History  She has a past surgical history that includes Other surgical history (03/29/2022) and Other surgical history (03/29/2022).     Social History  She reports that she has been smoking cigarettes. She has been smoking an average of .25 packs per day. She has quit using smokeless tobacco. She reports that she does not currently use alcohol. She reports that she does not use drugs.    Family History  Family History   Problem Relation Name Age of Onset    Hypertension Mother      Heart disease Father      Diabetes Father      Hypertension Father          Allergies  Patient has no known allergies.    Review of Systems     Physical Exam     Last Recorded Vitals  There were no vitals taken for this visit.       Assessment/Plan   She is interested in repeat vocal cord injection and we will plan on restylane to gauge effect, left > right, with saline distention of the left vocal cord.     Lois Kenney MD

## 2024-03-12 NOTE — PROGRESS NOTES
Reason For Consult  Chief Complaint   Patient presents with    Referral     Left vocal cord paralyzed  Polyps removed          HISTORY OF PRESENT ILLNESS:  Suyapa Thao, who is a 53 y.o. female presenting for an initial visit for chronic hoarseness and vocal cord polyps.  I last saw her in 2017.    The patient was seen by Dr. Saunders who performed an injection to the vocal cords. She was also seen at [facility] and underwent a restylane injection twice by Dr Miller.  First injection was to left side only, second injection to right and left. The injections from Dr Miller were helpful.        Past Medical History  She has a past medical history of ADHD (attention deficit hyperactivity disorder), Adjustment disorder, Anxiety, Chronic pain disorder, Depression, Hallucination, Memory loss, Panic attack, Post-traumatic stress disorder, unspecified (08/02/2022), Psychiatric illness, PTSD (post-traumatic stress disorder), and Sleep difficulties.  Surgical History  She has a past surgical history that includes Other surgical history (03/29/2022) and Other surgical history (03/29/2022).     Social History  She reports that she has been smoking cigarettes. She has been smoking an average of .25 packs per day. She has quit using smokeless tobacco. She reports that she does not currently use alcohol. She reports that she does not use drugs.    Allergies  Patient has no known allergies.    Review of Systems  All 10 systems were reviewed and negative except for above.      Physical Exam  CONSTITUTIONAL: Well developed, well nourished.    VOICE: Moderate strained hoarseness   RESPIRATION: Breathing comfortably, no stridor.    NEURO: Alert and oriented x3, cranial nerves II-XII intact and symmetric bilaterally.    EARS: Normal external ears, external auditory canals, normal hearing to conversational voice.    NOSE: External nose midline, anterior rhinoscopy is normal with limited visualization to the anterior aspect of the interior  "turbinates. No lesions noted.     ORAL CAVITY/OROPHARYNX/LIPS: Normal mucous membranes, normal floor of mouth/tongue/OP, no masses or lesions are noted.    SKIN: Neck skin is without scar or injury.    PSYCH: Alert and oriented with appropriate mood and affect.        Last Recorded Vitals  Temperature 36.4 °C (97.5 °F), height 1.6 m (5' 3\"), weight (!) 19.8 kg (43 lb 9.6 oz).    Procedure  PROCEDURE NOTE:  Recommended stroboscopy.  Risks, benefits,  and alternatives were explained.  They wish to proceed and provide verbal consent.     PROCEDURE: Flexible laryngoscopy with stroboscopy, CPT 64675     POSTPROCEDURE DIAGNOSIS: voice     INDICATIONS: Inability to tolerate mirror exam or abnormal findings on mirror, Stroboscopy performed to assess one of the followin. Diagnosis of symptomatic disorder involving the voice, swallow, upper aerodigestive tract, including CAREN disorders, or  2. Preoperative evaluation of vocal cord function for individuals undergoing surgery where the RLN or vagus nerves are at risk of injury, or  3. Further evaluation of abnormalities of the upper aerodigestive tract discovered by another modality, such as CT, MRI, bronchoscopy or EGD    Description of Procedure:    After adequate afrin and lidocaine spray, I advanced the endoscope.  Visualization of the nasopharynx, vallecula, posterior pharyngeal walls, pyriform, epiglottis and post cricoid areas was unremarkable.  The following laryngeal findings were noted:    vocal cord movement was asymmetric with left with limited movement  closure was incomplete with a moderate glottic gap mid aspect and small posteriorly   Mucosal wave was decreased on the left due to stiffness and sulcus, normal on the right   Compression was increased right > left   the subglottis was widely patent  Pharyngeal wall squeeze was normal     Procedure well tolerated.       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    Diagnoses are exacerbated by: prior left TVC procedures, high voice demand     We discussed the treatment options to include, medical and surgical options.  We have decided to proceed as follows:   Consideration of a repeat injection to improve her glottic closure as this is her main issues causing her poor voicing. The risks, indications, and complications of performing a left vocal cord injection with restylane were discussed with the patient and possible saline injection of the stiff left TVC to improve vocal cord flexibility.  If there are  improvement in her voice, we will discuss consideration of a fat injection vs thyroplasty   2.  We discussed the risks, benefits  and alternatives of office based laser intervention to include but not be limited to, bleeding and infection, damage to surrounding structures muscles, nerves, blood vessels as well as scarring. We further discussed the possibility of change in voice with persistent or worsened hoarseness, swallowing difficulty, breathing difficulty, the need for more than one procedure,  medical complications and risks of anesthesia.   All her questions were answered.    Scribe Attestation  By signing my name below, I, Merlyn Dos Santos , Crista attest that this documentation has been prepared under the direction and in the presence of Lois Keneny MD.

## 2024-03-12 NOTE — LETTER
March 18, 2024     Brennan Kinney MD  [address]  [address]  [facility] [address]    Patient: Suyapa Thao   YOB: 1970   Date of Visit: 3/12/2024       Dear Dr. Brennan Kinney MD:    Thank you for referring Suyapa Thao to me for evaluation. Below are my notes for this consultation.  If you have questions, please do not hesitate to call me. I look forward to following your patient along with you.       Sincerely,     Lois Kenney MD      CC: No Recipients  ______________________________________________________________________________________    Reason For Consult  Chief Complaint   Patient presents with   • Referral     Left vocal cord paralyzed  Polyps removed          HISTORY OF PRESENT ILLNESS:  Suyapa Thao, who is a 53 y.o. female presenting for an initial visit for chronic hoarseness and vocal cord polyps.  I last saw her in 2017.    The patient was seen by Dr. Saunders who performed an injection to the vocal cords. She was also seen at [facility] and underwent a restylane injection twice by Dr Miller.  First injection was to left side only, second injection to right and left. The injections from Dr Miller were helpful.        Past Medical History  She has a past medical history of ADHD (attention deficit hyperactivity disorder), Adjustment disorder, Anxiety, Chronic pain disorder, Depression, Hallucination, Memory loss, Panic attack, Post-traumatic stress disorder, unspecified (08/02/2022), Psychiatric illness, PTSD (post-traumatic stress disorder), and Sleep difficulties.  Surgical History  She has a past surgical history that includes Other surgical history (03/29/2022) and Other surgical history (03/29/2022).     Social History  She reports that she has been smoking cigarettes. She has been smoking an average of .25 packs per day. She has quit using smokeless tobacco. She reports that she does not currently use alcohol. She reports that she does not use drugs.    Allergies  Patient has no  "known allergies.    Review of Systems  All 10 systems were reviewed and negative except for above.      Physical Exam  CONSTITUTIONAL: Well developed, well nourished.    VOICE: Moderate strained hoarseness   RESPIRATION: Breathing comfortably, no stridor.    NEURO: Alert and oriented x3, cranial nerves II-XII intact and symmetric bilaterally.    EARS: Normal external ears, external auditory canals, normal hearing to conversational voice.    NOSE: External nose midline, anterior rhinoscopy is normal with limited visualization to the anterior aspect of the interior turbinates. No lesions noted.     ORAL CAVITY/OROPHARYNX/LIPS: Normal mucous membranes, normal floor of mouth/tongue/OP, no masses or lesions are noted.    SKIN: Neck skin is without scar or injury.    PSYCH: Alert and oriented with appropriate mood and affect.        Last Recorded Vitals  Temperature 36.4 °C (97.5 °F), height 1.6 m (5' 3\"), weight (!) 19.8 kg (43 lb 9.6 oz).    Procedure  PROCEDURE NOTE:  Recommended stroboscopy.  Risks, benefits,  and alternatives were explained.  They wish to proceed and provide verbal consent.     PROCEDURE: Flexible laryngoscopy with stroboscopy, CPT 28520     POSTPROCEDURE DIAGNOSIS: voice     INDICATIONS: Inability to tolerate mirror exam or abnormal findings on mirror, Stroboscopy performed to assess one of the followin. Diagnosis of symptomatic disorder involving the voice, swallow, upper aerodigestive tract, including CAREN disorders, or  2. Preoperative evaluation of vocal cord function for individuals undergoing surgery where the RLN or vagus nerves are at risk of injury, or  3. Further evaluation of abnormalities of the upper aerodigestive tract discovered by another modality, such as CT, MRI, bronchoscopy or EGD    Description of Procedure:    After adequate afrin and lidocaine spray, I advanced the endoscope.  Visualization of the nasopharynx, vallecula, posterior pharyngeal walls, pyriform, epiglottis and " post cricoid areas was unremarkable.  The following laryngeal findings were noted:    vocal cord movement was asymmetric with left with limited movement  closure was incomplete with a moderate glottic gap mid aspect and small posteriorly   Mucosal wave was decreased on the left due to stiffness and sulcus, normal on the right   Compression was increased right > left   the subglottis was widely patent  Pharyngeal wall squeeze was normal     Procedure well tolerated.       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    Diagnoses are exacerbated by: prior left TVC procedures, high voice demand     We discussed the treatment options to include, medical and surgical options.  We have decided to proceed as follows:   Consideration of a repeat injection to improve her glottic closure as this is her main issues causing her poor voicing. The risks, indications, and complications of performing a left vocal cord injection with restylane were discussed with the patient and possible saline injection of the stiff left TVC to improve vocal cord flexibility.  If there are  improvement in her voice, we will discuss consideration of a fat injection vs thyroplasty   2.  We discussed the risks, benefits  and alternatives of office based laser intervention to include but not be limited to, bleeding and infection, damage to surrounding structures muscles, nerves, blood vessels as well as scarring. We further discussed the possibility of change in voice with persistent or worsened hoarseness, swallowing difficulty, breathing difficulty, the need for more than one procedure,  medical complications and risks of anesthesia.   All her questions were answered.    Scribe Attestation  By signing my name below, I, Merlyn Dos Santos ,  Scribe attest that this documentation has been prepared under the direction and in the presence of Lois Kenney MD.

## 2024-03-13 ENCOUNTER — TELEPHONE (OUTPATIENT)
Dept: OTOLARYNGOLOGY | Facility: CLINIC | Age: 54
End: 2024-03-13
Payer: MEDICARE

## 2024-03-13 NOTE — TELEPHONE ENCOUNTER
Called pt to schedule her surgery with Dr. Kenney. Pt confirmed surgery date of 4/15 at [facility]. I informed pt I would be emailing her a surgery packet with information. Pt stated ok thank you.    Addendum : Pt called me back and states she would like to move her surgery date to 5/6 due to her son's wedding in April. Pt confirmed new surgery date of 5/6 at [facility].   14-Feb-2023 15:16

## 2024-03-18 ENCOUNTER — PREP FOR PROCEDURE (OUTPATIENT)
Dept: GASTROENTEROLOGY | Facility: CLINIC | Age: 54
End: 2024-03-18
Payer: MEDICARE

## 2024-03-18 RX ORDER — SODIUM CHLORIDE 9 MG/ML
20 INJECTION, SOLUTION INTRAVENOUS CONTINUOUS
Status: CANCELLED | OUTPATIENT
Start: 2024-03-18

## 2024-03-19 ENCOUNTER — HOSPITAL ENCOUNTER (OUTPATIENT)
Dept: GASTROENTEROLOGY | Facility: HOSPITAL | Age: 54
Discharge: HOME | End: 2024-03-19
Payer: MEDICARE

## 2024-03-19 ENCOUNTER — TELEPHONE (OUTPATIENT)
Dept: BEHAVIORAL HEALTH | Facility: CLINIC | Age: 54
End: 2024-03-19
Payer: MEDICARE

## 2024-03-19 DIAGNOSIS — Z12.11 COLON CANCER SCREENING: ICD-10-CM

## 2024-03-19 DIAGNOSIS — F43.10 PTSD (POST-TRAUMATIC STRESS DISORDER): ICD-10-CM

## 2024-03-19 RX ORDER — CLONAZEPAM 1 MG/1
1 TABLET ORAL 2 TIMES DAILY
Qty: 180 TABLET | Refills: 0 | Status: SHIPPED | OUTPATIENT
Start: 2024-03-19 | End: 2024-06-17

## 2024-03-19 NOTE — PRE-PROCEDURE NOTE
Patient scheduled for colonoscopy.    Informed by nursing/anesthesia that patient had eaten solid food this morning (banana) and that case would be cancelled/rescheduled.    I did not see the patient on this day.

## 2024-03-26 NOTE — PROGRESS NOTES
Patient ID: Suyapa Thao is a 53 y.o. female.    Endometrial biopsy    Date/Time: 3/28/2024 10:36 AM    Performed by: Imelda Graham MD  Authorized by: Imelda Graham MD    Consent:     Consent obtained: written    Consent given by: patient    Risks discussed: bleeding, infection and pain    Alternatives discussed: observation and alternative treatment    Patient agrees, verbalizes understanding, and wants to proceed: yes    Indications:     Indications: abnormal uterine bleeding    Pre-procedure:     Urine pregnancy test: N/A    Procedure:     A bimanual exam was performed: yes      Uterus size: 6-8 weeks    Uterus position: anteverted    Prepped with: Betadine    Tenaculum used: yes      A local block was performed: no      Local anesthetic: none    Cervix dilated: no      Number of passes: 1  Findings:     Cervix: normal      Uterus depth by sound (cm): 8    Specimen collected: specimen collected and sent to pathology      Patient tolerance: tolerated well, no immediate complications  Subjective   Patient ID: Suyapa Thao is a 53 y.o. female who presents for gyn visit for adenomyosis.  She presents today for gyn visit. On review of the EMR she had a negative pap 8/22/2023 and negative pap and HPV on 12/31/2021.  She has been seeing Dr. Ontiveros and Leticia Wayne for gyn care.   Past ultrasound suggested a left sided uterine fibroid, but MRI performed on 9/15/2023 revealed the presence of uterine adenomyosis and prominent vulvar vascular lymphatics vs potential lymphangioma:  The uterus is anteverted and measures 8 x 8 x 4 cm.  The junctional zone is thick with internal cystic changes and heterogeneous arterial phase enhancement compatible with adenomyosis including portion posteriorly measuring 2.2 cm thickness with lobulated margin sagittal series 5, image 14. The uterus has a globular appearance. There is splaying of the uterine horns suggestive of uterine anomaly with features of septate versus  bicornuate. The endometrium has normal thickness and appearance.  There are nabothian cysts present. There are multiple thin walled fluid signal intensity nonenhancing tubular cystic changes bilateral vulva dominant components inferiorly representative portion left side series 3, image 41 measuring 17 mm AP diameter compared with 11 mm right-side series 3, image 41 most suggestive of prominent vascular lymphatics, potentially lymphangioma/lymphatic malformation.     Review of records show she is taking progestin-only oral contraceptive pill for menstrual control along with using Vagifem for vaginal dryness.   Estradiol and FSH 7/2023 returned not showing signs of menopause.   Menses on progestin only OCP is irregular. Menses last 10-14 days. She feels the cramps are better on the progestin. She denies any hot flushes.    She has been told she should have a hysterectomy by one prior gynecologist, and was told she should use a progestin releasing IUD by another. After discussion she is willing to have endometrial sampling to confirm healthy endometrial tissue. We did review if this returns benign she is a candidate for either treatment option, along with simple observation. She would also like to have FSH rechecked to assess for menopause now.                Review of Systems   Constitutional:  Negative for activity change.   HENT:  Negative for congestion.    Respiratory:  Negative for apnea and cough.    Cardiovascular:  Negative for chest pain.   Gastrointestinal:  Negative for constipation and diarrhea.   Genitourinary:  Negative for hematuria and vaginal pain.   Musculoskeletal:  Negative for joint swelling.   Neurological:  Negative for dizziness.   Psychiatric/Behavioral:  Negative for agitation.        Past Medical History:   Diagnosis Date    ADHD (attention deficit hyperactivity disorder)     Adjustment disorder     Anxiety     Chronic pain disorder     Depression     Hallucination     Memory loss     Panic  attack     Post-traumatic stress disorder, unspecified 08/02/2022    PTSD (post-traumatic stress disorder)    Psychiatric illness     PTSD (post-traumatic stress disorder)     Sleep difficulties       Past Surgical History:   Procedure Laterality Date    OTHER SURGICAL HISTORY  03/29/2022    Vocal Cordectomy    OTHER SURGICAL HISTORY  03/29/2022    Breast surgery      No Known Allergies   Current Outpatient Medications on File Prior to Visit   Medication Sig Dispense Refill    OLANZapine (ZyPREXA) 5 mg tablet Take by mouth.      clonazePAM (KlonoPIN) 1 mg tablet Take 1 tablet (1 mg) by mouth 2 times a day. 180 tablet 0    escitalopram (Lexapro) 10 mg tablet Take 1 tablet (10 mg) by mouth once daily. 30 tablet 11    escitalopram (Lexapro) 20 mg tablet Take 1 tablet (20 mg) by mouth once daily. 90 tablet 3    estradiol (Vagifem) 10 mcg tablet vaginal tablet Insert 1 tablet (10 mcg) into the vagina once daily at bedtime. Twice a week. 14 tablet 3    Incassia 0.35 mg tablet Take 1 tablet (0.35 mg) by mouth once daily.      norethindrone (Lyleq) 0.35 mg tablet Take 1 tablet (0.35 mg) by mouth once daily. 28 tablet 12    omeprazole (PriLOSEC) 40 mg DR capsule Take 1 capsule (40 mg) by mouth once daily.      ospemifene (Osphena) 60 mg tablet Take 1 tablet by mouth once daily.      oxybutynin (Ditropan) 5 mg tablet Take 1 tablet (5 mg) by mouth once daily.      prasterone, dhea, (Intrarosa) 6.5 mg insert Insert 6.5 mg into the vagina once daily. 30 each 3    prazosin (Minipress) 5 mg capsule Take 1 capsule (5 mg) by mouth once daily at bedtime. For nightmares 90 capsule 3    QUEtiapine (SEROquel) 200 mg tablet Take 1 tablet (200 mg) by mouth once daily at bedtime. 90 tablet 3    risperiDONE (RisperDAL) 1 mg tablet TAKE 1 TABLET BY MOUTH EVERY MORNING AND TAKE 1 TABLET BY MOUTH EVERY EVENING. 180 tablet 3    tiZANidine (Zanaflex) 2 mg tablet Take 1 tablet (2 mg) by mouth 3 times a day.      traMADol (Ultram) 50 mg tablet  Take 1 tablet (50 mg) by mouth 2 times a day.      [DISCONTINUED] ibuprofen 600 mg tablet Take 1 tablet (600 mg) by mouth 3 times a day as needed.       No current facility-administered medications on file prior to visit.        Objective   Physical Exam      Problem List Items Addressed This Visit       Vaginal atrophy    Overview     Vagifem is prescribed for vaginal dryness. She is also using a lubricant.          Adenomyosis of the uterus - Primary    Overview     Pelvic ultrasound in August 2023 had suggested uterine fibroid. MRI 9/15/2023 revealed uterine adenomyosis with normal appearing endometrium, no evidence of fibroid and no adnexal abnormality.  Menses are heavy and with cramps. She has had some improvement with progestin only OCP.  Endometrial biopsy is sent.   We discussed management options of observation, continuing progestin only pills, progestin releasing IUD and hysterectomy.          Relevant Orders    Follicle Stimulating Hormone    Estradiol    TSH with reflex to Free T4 if abnormal    Endometrial biopsy    Menorrhagia with irregular cycle    Overview     Heavy menses. Imaging shows evidence of adenomyosis.  Endometrial biopsy is performed.   We discussed management options of observation, continuing progestin only pills, progestin releasing IUD and hysterectomy. She will likely elect to continue progestin only pills.          Relevant Orders    Follicle Stimulating Hormone    Estradiol    TSH with reflex to Free T4 if abnormal    Endometrial biopsy     Other Visit Diagnoses       Adenomyosis of uterus        Relevant Orders    Follicle Stimulating Hormone    Estradiol    TSH with reflex to Free T4 if abnormal    Endometrial biopsy    Irregular menses        Relevant Orders    TSH with reflex to Free T4 if abnormal           Follow up after results to review and plan of care.

## 2024-03-28 ENCOUNTER — LAB (OUTPATIENT)
Dept: LAB | Facility: LAB | Age: 54
End: 2024-03-28
Payer: MEDICARE

## 2024-03-28 ENCOUNTER — OFFICE VISIT (OUTPATIENT)
Dept: OBSTETRICS AND GYNECOLOGY | Facility: CLINIC | Age: 54
End: 2024-03-28
Payer: MEDICARE

## 2024-03-28 VITALS
BODY MASS INDEX: 24.8 KG/M2 | DIASTOLIC BLOOD PRESSURE: 62 MMHG | WEIGHT: 140 LBS | HEIGHT: 63 IN | SYSTOLIC BLOOD PRESSURE: 104 MMHG

## 2024-03-28 DIAGNOSIS — N92.1 MENORRHAGIA WITH IRREGULAR CYCLE: ICD-10-CM

## 2024-03-28 DIAGNOSIS — N80.03 ADENOMYOSIS OF UTERUS: ICD-10-CM

## 2024-03-28 DIAGNOSIS — N92.6 IRREGULAR MENSES: ICD-10-CM

## 2024-03-28 DIAGNOSIS — N95.2 VAGINAL ATROPHY: ICD-10-CM

## 2024-03-28 DIAGNOSIS — N80.03 ADENOMYOSIS OF THE UTERUS: Primary | ICD-10-CM

## 2024-03-28 DIAGNOSIS — N80.03 ADENOMYOSIS OF THE UTERUS: ICD-10-CM

## 2024-03-28 LAB
ESTRADIOL SERPL-MCNC: 43 PG/ML
FSH SERPL-ACNC: 20.7 IU/L
TSH SERPL-ACNC: 1.89 MIU/L (ref 0.44–3.98)

## 2024-03-28 PROCEDURE — 99214 OFFICE O/P EST MOD 30 MIN: CPT | Performed by: OBSTETRICS & GYNECOLOGY

## 2024-03-28 PROCEDURE — 88305 TISSUE EXAM BY PATHOLOGIST: CPT | Performed by: PATHOLOGY

## 2024-03-28 PROCEDURE — 84443 ASSAY THYROID STIM HORMONE: CPT

## 2024-03-28 PROCEDURE — 88305 TISSUE EXAM BY PATHOLOGIST: CPT

## 2024-03-28 PROCEDURE — 58100 BIOPSY OF UTERUS LINING: CPT | Performed by: OBSTETRICS & GYNECOLOGY

## 2024-03-28 PROCEDURE — 83001 ASSAY OF GONADOTROPIN (FSH): CPT

## 2024-03-28 PROCEDURE — 4004F PT TOBACCO SCREEN RCVD TLK: CPT | Performed by: OBSTETRICS & GYNECOLOGY

## 2024-03-28 PROCEDURE — 82670 ASSAY OF TOTAL ESTRADIOL: CPT

## 2024-03-28 PROCEDURE — 36415 COLL VENOUS BLD VENIPUNCTURE: CPT

## 2024-03-28 RX ORDER — OLANZAPINE 5 MG/1
TABLET ORAL
COMMUNITY
Start: 2022-05-02 | End: 2024-04-10

## 2024-03-28 ASSESSMENT — ENCOUNTER SYMPTOMS
DIARRHEA: 0
DIZZINESS: 0
APNEA: 0
AGITATION: 0
CONSTIPATION: 0
JOINT SWELLING: 0
COUGH: 0
HEMATURIA: 0
ACTIVITY CHANGE: 0

## 2024-04-04 LAB
LABORATORY COMMENT REPORT: NORMAL
PATH REPORT.FINAL DX SPEC: NORMAL
PATH REPORT.GROSS SPEC: NORMAL
PATH REPORT.RELEVANT HX SPEC: NORMAL
PATH REPORT.TOTAL CANCER: NORMAL

## 2024-04-06 ENCOUNTER — PREP FOR PROCEDURE (OUTPATIENT)
Dept: GASTROENTEROLOGY | Facility: CLINIC | Age: 54
End: 2024-04-06
Payer: MEDICARE

## 2024-04-06 RX ORDER — SODIUM CHLORIDE 9 MG/ML
20 INJECTION, SOLUTION INTRAVENOUS CONTINUOUS
Status: CANCELLED | OUTPATIENT
Start: 2024-04-06

## 2024-04-08 ENCOUNTER — OFFICE VISIT (OUTPATIENT)
Dept: PRIMARY CARE | Facility: CLINIC | Age: 54
End: 2024-04-08
Payer: MEDICARE

## 2024-04-08 VITALS
RESPIRATION RATE: 16 BRPM | TEMPERATURE: 98 F | SYSTOLIC BLOOD PRESSURE: 121 MMHG | OXYGEN SATURATION: 97 % | HEIGHT: 63 IN | HEART RATE: 84 BPM | DIASTOLIC BLOOD PRESSURE: 84 MMHG | WEIGHT: 143.5 LBS | BODY MASS INDEX: 25.43 KG/M2

## 2024-04-08 DIAGNOSIS — F33.9 DEPRESSION, RECURRENT (CMS-HCC): ICD-10-CM

## 2024-04-08 DIAGNOSIS — E78.5 HYPERLIPIDEMIA, UNSPECIFIED HYPERLIPIDEMIA TYPE: ICD-10-CM

## 2024-04-08 DIAGNOSIS — R71.8 MICROCYTOSIS: ICD-10-CM

## 2024-04-08 DIAGNOSIS — E16.2 HYPOGLYCEMIA: ICD-10-CM

## 2024-04-08 DIAGNOSIS — M85.80 OSTEOPENIA, UNSPECIFIED LOCATION: ICD-10-CM

## 2024-04-08 DIAGNOSIS — R73.03 PREDIABETES: Primary | ICD-10-CM

## 2024-04-08 PROBLEM — N89.8 VAGINAL DISCHARGE: Status: ACTIVE | Noted: 2024-04-08

## 2024-04-08 PROBLEM — N64.4 BREAST PAIN: Status: ACTIVE | Noted: 2024-04-08

## 2024-04-08 PROBLEM — S99.921A INJURY OF RIGHT FOOT: Status: ACTIVE | Noted: 2024-04-08

## 2024-04-08 PROBLEM — N90.7 LABIAL CYST: Status: ACTIVE | Noted: 2024-04-08

## 2024-04-08 PROBLEM — M81.0 MENOPAUSAL OSTEOPOROSIS: Status: ACTIVE | Noted: 2024-04-08

## 2024-04-08 PROBLEM — H91.90 SUBJECTIVE HEARING LOSS: Status: ACTIVE | Noted: 2024-04-08

## 2024-04-08 PROBLEM — T23.079A: Status: ACTIVE | Noted: 2024-04-08

## 2024-04-08 PROBLEM — M25.579 ANKLE PAIN: Status: ACTIVE | Noted: 2024-04-08

## 2024-04-08 PROBLEM — R32 URINARY INCONTINENCE: Status: ACTIVE | Noted: 2023-10-01

## 2024-04-08 PROCEDURE — 4004F PT TOBACCO SCREEN RCVD TLK: CPT | Performed by: INTERNAL MEDICINE

## 2024-04-08 PROCEDURE — 99214 OFFICE O/P EST MOD 30 MIN: CPT | Performed by: INTERNAL MEDICINE

## 2024-04-08 RX ORDER — ROSUVASTATIN CALCIUM 5 MG/1
5 TABLET, COATED ORAL DAILY
Qty: 90 TABLET | Refills: 1 | Status: SHIPPED | OUTPATIENT
Start: 2024-04-08 | End: 2025-05-13

## 2024-04-08 SDOH — ECONOMIC STABILITY: FOOD INSECURITY: WITHIN THE PAST 12 MONTHS, YOU WORRIED THAT YOUR FOOD WOULD RUN OUT BEFORE YOU GOT MONEY TO BUY MORE.: NEVER TRUE

## 2024-04-08 SDOH — ECONOMIC STABILITY: FOOD INSECURITY: WITHIN THE PAST 12 MONTHS, THE FOOD YOU BOUGHT JUST DIDN'T LAST AND YOU DIDN'T HAVE MONEY TO GET MORE.: NEVER TRUE

## 2024-04-08 ASSESSMENT — LIFESTYLE VARIABLES
HOW OFTEN DO YOU HAVE A DRINK CONTAINING ALCOHOL: NEVER
SKIP TO QUESTIONS 9-10: 1
HOW OFTEN DO YOU HAVE SIX OR MORE DRINKS ON ONE OCCASION: NEVER
HOW MANY STANDARD DRINKS CONTAINING ALCOHOL DO YOU HAVE ON A TYPICAL DAY: PATIENT DOES NOT DRINK
AUDIT-C TOTAL SCORE: 0

## 2024-04-08 ASSESSMENT — PATIENT HEALTH QUESTIONNAIRE - PHQ9
SUM OF ALL RESPONSES TO PHQ QUESTIONS 1-9: 18
2. FEELING DOWN, DEPRESSED OR HOPELESS: NEARLY EVERY DAY
7. TROUBLE CONCENTRATING ON THINGS, SUCH AS READING THE NEWSPAPER OR WATCHING TELEVISION: NEARLY EVERY DAY
6. FEELING BAD ABOUT YOURSELF - OR THAT YOU ARE A FAILURE OR HAVE LET YOURSELF OR YOUR FAMILY DOWN: SEVERAL DAYS
SUM OF ALL RESPONSES TO PHQ9 QUESTIONS 1 & 2: 6
9. THOUGHTS THAT YOU WOULD BE BETTER OFF DEAD, OR OF HURTING YOURSELF: NOT AT ALL
10. IF YOU CHECKED OFF ANY PROBLEMS, HOW DIFFICULT HAVE THESE PROBLEMS MADE IT FOR YOU TO DO YOUR WORK, TAKE CARE OF THINGS AT HOME, OR GET ALONG WITH OTHER PEOPLE: VERY DIFFICULT
1. LITTLE INTEREST OR PLEASURE IN DOING THINGS: NEARLY EVERY DAY
8. MOVING OR SPEAKING SO SLOWLY THAT OTHER PEOPLE COULD HAVE NOTICED. OR THE OPPOSITE, BEING SO FIGETY OR RESTLESS THAT YOU HAVE BEEN MOVING AROUND A LOT MORE THAN USUAL: MORE THAN HALF THE DAYS
3. TROUBLE FALLING OR STAYING ASLEEP: NEARLY EVERY DAY
4. FEELING TIRED OR HAVING LITTLE ENERGY: NEARLY EVERY DAY
5. POOR APPETITE OR OVEREATING: NOT AT ALL

## 2024-04-08 NOTE — ASSESSMENT & PLAN NOTE
Patient may have had some hypoglycemia, check an insulin level, she may have a component of insulin resistance

## 2024-04-09 ENCOUNTER — ANESTHESIA EVENT (OUTPATIENT)
Dept: GASTROENTEROLOGY | Facility: HOSPITAL | Age: 54
End: 2024-04-09
Payer: MEDICARE

## 2024-04-09 ENCOUNTER — ANESTHESIA (OUTPATIENT)
Dept: GASTROENTEROLOGY | Facility: HOSPITAL | Age: 54
End: 2024-04-09
Payer: MEDICARE

## 2024-04-09 ENCOUNTER — HOSPITAL ENCOUNTER (OUTPATIENT)
Dept: GASTROENTEROLOGY | Facility: HOSPITAL | Age: 54
Discharge: HOME | End: 2024-04-09
Payer: MEDICARE

## 2024-04-09 VITALS
SYSTOLIC BLOOD PRESSURE: 118 MMHG | BODY MASS INDEX: 27.48 KG/M2 | OXYGEN SATURATION: 97 % | HEIGHT: 60 IN | TEMPERATURE: 97.2 F | WEIGHT: 140 LBS | DIASTOLIC BLOOD PRESSURE: 74 MMHG | RESPIRATION RATE: 16 BRPM | HEART RATE: 72 BPM

## 2024-04-09 DIAGNOSIS — Z12.11 COLON CANCER SCREENING: ICD-10-CM

## 2024-04-09 PROCEDURE — 3700000002 HC GENERAL ANESTHESIA TIME - EACH INCREMENTAL 1 MINUTE

## 2024-04-09 PROCEDURE — 3700000001 HC GENERAL ANESTHESIA TIME - INITIAL BASE CHARGE

## 2024-04-09 PROCEDURE — G0121 COLON CA SCRN NOT HI RSK IND: HCPCS | Performed by: INTERNAL MEDICINE

## 2024-04-09 PROCEDURE — 2500000004 HC RX 250 GENERAL PHARMACY W/ HCPCS (ALT 636 FOR OP/ED): Performed by: NURSE ANESTHETIST, CERTIFIED REGISTERED

## 2024-04-09 PROCEDURE — 2500000004 HC RX 250 GENERAL PHARMACY W/ HCPCS (ALT 636 FOR OP/ED): Performed by: INTERNAL MEDICINE

## 2024-04-09 PROCEDURE — 2500000005 HC RX 250 GENERAL PHARMACY W/O HCPCS: Performed by: NURSE ANESTHETIST, CERTIFIED REGISTERED

## 2024-04-09 PROCEDURE — 7100000010 HC PHASE TWO TIME - EACH INCREMENTAL 1 MINUTE

## 2024-04-09 PROCEDURE — 7100000009 HC PHASE TWO TIME - INITIAL BASE CHARGE

## 2024-04-09 PROCEDURE — 45378 DIAGNOSTIC COLONOSCOPY: CPT | Performed by: INTERNAL MEDICINE

## 2024-04-09 RX ORDER — LIDOCAINE HYDROCHLORIDE 20 MG/ML
INJECTION, SOLUTION INFILTRATION; PERINEURAL AS NEEDED
Status: DISCONTINUED | OUTPATIENT
Start: 2024-04-09 | End: 2024-04-09

## 2024-04-09 RX ORDER — PROPOFOL 10 MG/ML
INJECTION, EMULSION INTRAVENOUS AS NEEDED
Status: DISCONTINUED | OUTPATIENT
Start: 2024-04-09 | End: 2024-04-09

## 2024-04-09 RX ORDER — SODIUM CHLORIDE 9 MG/ML
20 INJECTION, SOLUTION INTRAVENOUS CONTINUOUS
Status: DISCONTINUED | OUTPATIENT
Start: 2024-04-09 | End: 2024-04-10 | Stop reason: HOSPADM

## 2024-04-09 RX ADMIN — PROPOFOL 30 MG: 10 INJECTION, EMULSION INTRAVENOUS at 14:03

## 2024-04-09 RX ADMIN — PROPOFOL 100 MG: 10 INJECTION, EMULSION INTRAVENOUS at 13:56

## 2024-04-09 RX ADMIN — PROPOFOL 20 MG: 10 INJECTION, EMULSION INTRAVENOUS at 14:05

## 2024-04-09 RX ADMIN — SODIUM CHLORIDE 20 ML/HR: 9 INJECTION, SOLUTION INTRAVENOUS at 13:33

## 2024-04-09 RX ADMIN — LIDOCAINE HYDROCHLORIDE 50 MG: 20 INJECTION, SOLUTION INFILTRATION; PERINEURAL at 13:56

## 2024-04-09 RX ADMIN — PROPOFOL 20 MG: 10 INJECTION, EMULSION INTRAVENOUS at 14:07

## 2024-04-09 RX ADMIN — PROPOFOL 30 MG: 10 INJECTION, EMULSION INTRAVENOUS at 13:59

## 2024-04-09 SDOH — HEALTH STABILITY: MENTAL HEALTH: CURRENT SMOKER: 1

## 2024-04-09 ASSESSMENT — PAIN SCALES - GENERAL
PAINLEVEL_OUTOF10: 0 - NO PAIN
PAIN_LEVEL: 0
PAINLEVEL_OUTOF10: 0 - NO PAIN
PAINLEVEL_OUTOF10: 0 - NO PAIN

## 2024-04-09 ASSESSMENT — COLUMBIA-SUICIDE SEVERITY RATING SCALE - C-SSRS
6. HAVE YOU EVER DONE ANYTHING, STARTED TO DO ANYTHING, OR PREPARED TO DO ANYTHING TO END YOUR LIFE?: NO
1. IN THE PAST MONTH, HAVE YOU WISHED YOU WERE DEAD OR WISHED YOU COULD GO TO SLEEP AND NOT WAKE UP?: NO
2. HAVE YOU ACTUALLY HAD ANY THOUGHTS OF KILLING YOURSELF?: NO

## 2024-04-09 ASSESSMENT — PAIN - FUNCTIONAL ASSESSMENT
PAIN_FUNCTIONAL_ASSESSMENT: 0-10

## 2024-04-09 NOTE — PROGRESS NOTES
Subjective   Patient ID: Suyapa Thao is a 53 y.o. female who presents for Results.  Documentation from 3/28/2024:  She presents today for gyn visit. On review of the EMR she had a negative pap 8/22/2023 and negative pap and HPV on 12/31/2021.  She has been seeing Dr. Ontiveros and Leticia Wayne for gyn care.   Past ultrasound suggested a left sided uterine fibroid, but MRI performed on 9/15/2023 revealed the presence of uterine adenomyosis and prominent vulvar vascular lymphatics vs potential lymphangioma:  The uterus is anteverted and measures 8 x 8 x 4 cm.  The junctional zone is thick with internal cystic changes and heterogeneous arterial phase enhancement compatible with adenomyosis including portion posteriorly measuring 2.2 cm thickness with lobulated margin sagittal series 5, image 14. The uterus has a globular appearance. There is splaying of the uterine horns suggestive of uterine anomaly with features of septate versus bicornuate. The endometrium has normal thickness and appearance.  There are nabothian cysts present. There are multiple thin walled fluid signal intensity nonenhancing tubular cystic changes bilateral vulva dominant components inferiorly representative portion left side series 3, image 41 measuring 17 mm AP diameter compared with 11 mm right-side series 3, image 41 most suggestive of prominent vascular lymphatics, potentially lymphangioma/lymphatic malformation.     Review of records show she is taking progestin-only oral contraceptive pill for menstrual control along with using Vagifem for vaginal dryness.   Estradiol and FSH 7/2023 returned not showing signs of menopause.   Menses on progestin only OCP is irregular. Menses last 10-14 days. She feels the cramps are better on the progestin. She denies any hot flushes.    She has been told she should have a hysterectomy by one prior gynecologist, and was told she should use a progestin releasing IUD by another. After discussion she is willing to  have endometrial sampling to confirm healthy endometrial tissue. We did review if this returns benign she is a candidate for either treatment option, along with simple observation. She would also like to have FSH rechecked to assess for menopause now.      FSH and estradiol returned not in menopausal range but trending towards this compared to 8 months ago.   Endometrial biopsy returned benign:  A. ENDOMETRIUM BIOPSY:   -- FRAGMENTS OF PROLIFERATIVE ENDOMETRIUM WITH STROMAL CHANGES SUGGESTIVE OF ENDOMETRIAL POLYP IN A BACKGROUND OF MENSTRUAL PATTERN ENDOMETRIUM    At last visit she desired to continue with progestin only OCP during her transition into menopause since menstrual bleeding is controlled. We reviewed results and imaging. She desires to continue with current plan. We did discuss repeating FSH and estradiol once menses stop and after being off progestin only OCP for 2 weeks.             Review of Systems   Constitutional:  Negative for activity change.   HENT:  Negative for congestion.    Respiratory:  Negative for apnea and cough.    Cardiovascular:  Negative for chest pain.   Gastrointestinal:  Negative for constipation and diarrhea.   Genitourinary:  Negative for hematuria and vaginal pain.   Musculoskeletal:  Negative for joint swelling.   Neurological:  Negative for dizziness.   Psychiatric/Behavioral:  Negative for agitation.        Past Medical History:   Diagnosis Date    ADHD (attention deficit hyperactivity disorder)     Adjustment disorder     Anxiety     Chronic pain disorder     Depression     Hallucination     Memory loss     Panic attack     Post-traumatic stress disorder, unspecified 08/02/2022    PTSD (post-traumatic stress disorder)    Psychiatric illness     PTSD (post-traumatic stress disorder)     Sleep difficulties       Past Surgical History:   Procedure Laterality Date    OTHER SURGICAL HISTORY  03/29/2022    Vocal Cordectomy    OTHER SURGICAL HISTORY  03/29/2022    Breast surgery       No Known Allergies   Current Outpatient Medications on File Prior to Visit   Medication Sig Dispense Refill    clonazePAM (KlonoPIN) 1 mg tablet Take 1 tablet (1 mg) by mouth 2 times a day. 180 tablet 0    escitalopram (Lexapro) 10 mg tablet Take 1 tablet (10 mg) by mouth once daily. 30 tablet 11    escitalopram (Lexapro) 20 mg tablet Take 1 tablet (20 mg) by mouth once daily. 90 tablet 3    estradiol (Vagifem) 10 mcg tablet vaginal tablet Insert 1 tablet (10 mcg) into the vagina once daily at bedtime. Twice a week. 14 tablet 3    Incassia 0.35 mg tablet Take 1 tablet (0.35 mg) by mouth once daily.      norethindrone (Lyleq) 0.35 mg tablet Take 1 tablet (0.35 mg) by mouth once daily. 28 tablet 12    OLANZapine (ZyPREXA) 5 mg tablet Take by mouth.      omeprazole (PriLOSEC) 40 mg DR capsule Take 1 capsule (40 mg) by mouth once daily.      ospemifene (Osphena) 60 mg tablet Take 1 tablet by mouth once daily.      prasterone, dhea, (Intrarosa) 6.5 mg insert Insert 6.5 mg into the vagina once daily. (Patient not taking: Reported on 4/9/2024) 30 each 3    prazosin (Minipress) 5 mg capsule Take 1 capsule (5 mg) by mouth once daily at bedtime. For nightmares 90 capsule 3    QUEtiapine (SEROquel) 200 mg tablet Take 1 tablet (200 mg) by mouth once daily at bedtime. 90 tablet 3    risperiDONE (RisperDAL) 1 mg tablet TAKE 1 TABLET BY MOUTH EVERY MORNING AND TAKE 1 TABLET BY MOUTH EVERY EVENING. 180 tablet 3    rosuvastatin (Crestor) 5 mg tablet Take 1 tablet (5 mg) by mouth once daily. 90 tablet 1    tiZANidine (Zanaflex) 2 mg tablet Take 1 tablet (2 mg) by mouth 3 times a day.      traMADol (Ultram) 50 mg tablet Take 1 tablet (50 mg) by mouth 2 times a day.      [DISCONTINUED] oxybutynin (Ditropan) 5 mg tablet Take 1 tablet (5 mg) by mouth once daily.       Current Facility-Administered Medications on File Prior to Visit   Medication Dose Route Frequency Provider Last Rate Last Admin    [DISCONTINUED] lidocaine  (Xylocaine) 20 mg/mL (2 %) injection   injection PRN Catrachita Loya APRN-CRNA   50 mg at 04/09/24 1356    [DISCONTINUED] propofol (Diprivan) injection   intravenous PRN Catrachita JOCELYN Loya APRN-CRNA   20 mg at 04/09/24 1407    [DISCONTINUED] sodium chloride 0.9% infusion  20 mL/hr intravenous Continuous Ty Ceja MD 20 mL/hr at 04/09/24 1352 Continued by Anesthesia at 04/09/24 1352        Objective   Physical Exam  Constitutional:       Appearance: Normal appearance.   Neurological:      Mental Status: She is alert and oriented to person, place, and time.   Psychiatric:         Attention and Perception: Attention normal.         Mood and Affect: Mood and affect normal.         Speech: Speech normal.         Behavior: Behavior normal. Behavior is cooperative.           Problem List Items Addressed This Visit       Adenomyosis of the uterus - Primary    Overview     Pelvic ultrasound in August 2023 had suggested uterine fibroid. MRI 9/15/2023 revealed uterine adenomyosis with normal appearing endometrium, no evidence of fibroid and no adnexal abnormality.  Menses are heavy and with cramps. She has had some improvement with progestin only OCP.  Endometrial biopsy returned benign with fragment of polyp. FSH and estradiol are trending  towards menopause.   We discussed management options of observation, continuing progestin only pills, progestin releasing IUD and hysterectomy.          Current Assessment & Plan     After discussion she desires to continue progestin only contraceptive pills during transition into menopause.   We will plan repeat FSH and estradiol once menses stop.          Menorrhagia with irregular cycle    Overview     Heavy menses. Imaging shows evidence of adenomyosis.  Endometrial biopsy 3/28/2024 returned with fragment of endometrial polyp in background of menstrual endometrium. A polyp was not visualized on prior MRI or ultrasound so this is likely very small.  We discussed management options  of observation, continuing progestin only pills, progestin releasing IUD and hysterectomy. She will likely elect to continue progestin only pills.   TSH returned in normal range. FSH and estradiol are trending toward menopause.
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
## 2024-04-09 NOTE — ANESTHESIA PREPROCEDURE EVALUATION
Patient: Suyapa Thao    Procedure Information       Date/Time: 04/09/24 1230    Scheduled providers: Ty Ceja MD    Procedure: COLONOSCOPY    Location:  [facility]            Relevant Problems   Anesthesia (within normal limits)      Cardiac   (+) Breast pain   (+) Hyperlipidemia      Pulmonary   (+) COPD, mild (CMS/HCC)      Neuro   (+) Depression, recurrent (CMS/HCC)   (+) PTSD (post-traumatic stress disorder)   (+) Right cervical radiculopathy   (+) Right lumbar radiculitis      Hematology   (+) Mild anemia      Musculoskeletal   (+) Cervical spondylosis without myelopathy   (+) Degeneration of intervertebral disc of lumbosacral region   (+) Lumbar spondylosis   (+) Myofascial pain syndrome of lumbar spine   (+) Spinal stenosis of cervical region      HEENT   (+) Sensorineural hearing loss (SNHL) of both ears      GYN   (+) Menorrhagia with irregular cycle       Clinical information reviewed:   Tobacco  Allergies  Meds   Med Hx  Surg Hx   Fam Hx  Soc Hx        NPO Detail:  NPO/Void Status  Date of Last Liquid: 04/09/24  Time of Last Liquid: 1100  Date of Last Solid: 04/07/24  Time of Last Solid: 2100  Last Intake Type: Clear fluids         Physical Exam    Airway  Mallampati: III  TM distance: >3 FB  Neck ROM: full     Cardiovascular - normal exam     Dental - normal exam     Pulmonary - normal exam     Abdominal            Anesthesia Plan    History of general anesthesia?: yes  History of complications of general anesthesia?: no    ASA 2     MAC     The patient is a current smoker.  Patient was previously instructed to abstain from smoking on day of procedure.  Patient did not smoke on day of procedure.    intravenous induction   Anesthetic plan and risks discussed with patient.  Use of blood products discussed with patient who consented to blood products.    Plan discussed with CRNA.

## 2024-04-09 NOTE — ANESTHESIA POSTPROCEDURE EVALUATION
Patient: Suyapa Thao    Procedure Summary       Date: 04/09/24 Room / Location: [facility]    Anesthesia Start: 1352 Anesthesia Stop: 1417    Procedure: COLONOSCOPY Diagnosis: Colon cancer screening    Scheduled Providers: Ty Ceja MD Responsible Provider: EVANS Arias    Anesthesia Type: MAC ASA Status: 2            Anesthesia Type: MAC    Vitals Value Taken Time   /73 04/09/24 1414   Temp 36.4 °C (97.5 °F) 04/09/24 1414   Pulse 68 04/09/24 1414   Resp 20 04/09/24 1414   SpO2 97 % 04/09/24 1414       Anesthesia Post Evaluation    Patient location during evaluation: PACU  Patient participation: complete - patient participated  Level of consciousness: sleepy but conscious  Pain score: 0  Pain management: adequate  Airway patency: patent  Cardiovascular status: acceptable and blood pressure returned to baseline  Respiratory status: acceptable, spontaneous ventilation and room air  Hydration status: acceptable  Postoperative Nausea and Vomiting: none        There were no known notable events for this encounter.

## 2024-04-10 ENCOUNTER — OFFICE VISIT (OUTPATIENT)
Dept: OBSTETRICS AND GYNECOLOGY | Facility: CLINIC | Age: 54
End: 2024-04-10
Payer: MEDICARE

## 2024-04-10 ENCOUNTER — TELEPHONE (OUTPATIENT)
Dept: PRIMARY CARE | Facility: CLINIC | Age: 54
End: 2024-04-10

## 2024-04-10 VITALS — WEIGHT: 142 LBS | BODY MASS INDEX: 27.46 KG/M2

## 2024-04-10 DIAGNOSIS — J44.9 COPD, MILD (MULTI): Primary | ICD-10-CM

## 2024-04-10 DIAGNOSIS — N80.03 ADENOMYOSIS OF THE UTERUS: Primary | ICD-10-CM

## 2024-04-10 DIAGNOSIS — N92.1 MENORRHAGIA WITH IRREGULAR CYCLE: ICD-10-CM

## 2024-04-10 PROCEDURE — 4004F PT TOBACCO SCREEN RCVD TLK: CPT | Performed by: OBSTETRICS & GYNECOLOGY

## 2024-04-10 PROCEDURE — 99214 OFFICE O/P EST MOD 30 MIN: CPT | Performed by: OBSTETRICS & GYNECOLOGY

## 2024-04-10 ASSESSMENT — ENCOUNTER SYMPTOMS
ACTIVITY CHANGE: 0
HEMATURIA: 0
DIZZINESS: 0
CONSTIPATION: 0
JOINT SWELLING: 0
DIARRHEA: 0
APNEA: 0
AGITATION: 0
COUGH: 0

## 2024-04-10 NOTE — ASSESSMENT & PLAN NOTE
After discussion she desires to continue progestin only contraceptive pills during transition into menopause.   We will plan repeat FSH and estradiol once menses stop.

## 2024-04-10 NOTE — TELEPHONE ENCOUNTER
Pt said that she forgot to mention she wants to go back on the inhaler she was put on by Dr. MCCOLLUM two years ago when she was just here for an office visit on 4/8/2024. Pt states it is for her mild emphysema. Kay in [place].

## 2024-04-10 NOTE — PREPROCEDURE INSTRUCTIONS
Pre-Op Instructions & Checklist   Your surgery has been scheduled at [facility] at [address] [place], [address], [address], in the [facility]. Parking is to the left of the main entrance.  You will be contacted about the time of your surgery the day before your surgery. If you are unable to answer the phone, a detailed voicemail message will be left. Make sure that your voicemail box is not full so a message can be left. If you have not received a call by 3:00 pm you may call [phone] between the hours of 3:00 and 4:00 pm. Please be available by phone the night before/day of surgery in case there is a change in the schedule which may require you to arrive earlier/later.    14 DAYS BEFORE SURGERY STOP TAKING WEIGHT LOSS MEDICATIONS      7 DAYS BEFORE SURGERY STOP THESE MEDICATIONS:  Multiple Vitamins containing Vitamin E  Herbal supplements, Fish Oil, garlic pills, turmeric, CoQ enzyme  Stop taking aspirin, and aspirin-containing products as well as NSAID's such as Advil, Motrin, Aleve, Ibuprofen. Tylenol is okay to take for pain relief.   If you are currently taking Coumadin/Warfarin, we will have to coordinate that with your PCP &/or the Anticoagulation Clinic.    THE DAY BEFORE SURGERY:  Do not eat any food after midnight the night before surgery.   You are permitted to have clear liquids such as water, apple juice, plain tea or coffee (no milk or creamer), clear electrolyte-replenishing drinks such as Pedialyte, Gatorade, or Powerade (not yogurt or pulp-containing smoothies or juices such as orange juice) up to 2 hours before your surgery.    DAY OF SURGERY, TAKE THESE MEDICATIONS with a small sip of water (if it is not listed, do not take it):    Take: Risperidone; escitalopram; oral contraceptive pill                 ON THE MORNING OF SURGERY:  *Shower either the night before your surgery or the morning of your surgery  *Do not use moisturizers, creams, lotions or  perfume, or make-up.  *Wear comfortable, loose fitting clothing.   *All jewelry and valuables should be left at home.  *Prosthetic devices such as contact lenses, hearing aids, dentures, eyelash extensions, hairpins and body piercings must be removed before surgery. Bring containers for eyeglasses/contacts, dentures, or hearing aids with you.  Diabetics: Please check fasting blood sugars upon waking up.  If fasting blood sugars are <80ml/dl, please drink 100ml/3oz. of apple juice no later than 2 hours prior to surgery.    BRING WITH YOU:   *Photo ID and insurance card  *Current list of medicines and allergies  *Pacemaker/Defibrillator/Heart stent cards  *Copy of your complete Advanced Directive/DHPOA-if applicable     SMOKING:  *Quitting smoking can make a huge difference to your health and recovery from surgery.    *If you need help with quitting, call [phone]-NOW.  Alcohol:  *No alcoholic beverages for 48 hours before surgery.     AFTER OUTPATIENT SURGERY:  *A responsible adult MUST accompany you at the time of discharge and stay with you for 24 hours after your surgery.  *You may NOT drive yourself home after surgery.  *You may use a taxi or ride sharing service ([employer], Uber) to return home ONLY if you are to change the date accompanied by a friend or family member.  *Instructions for resuming your medications will be provided by your surgeon.     CONTACT SURGEON'S OFFICE IF YOU DEVELOP:  * Fever =/> 100.4 F   * New respiratory symptoms (e.g. cough, shortness of breath, respiratory distress, sore throat)  * Recent loss of taste or smell  *Flu like symptoms such as headache, fatigue or gastrointestinal symptoms  * If you develop any open sores, shingles, burning or painful urination   AND/OR:  * You no longer wish to have the surgery.  * Any other personal circumstances change that may lead to the need to cancel or defer this surgery.  *You were admitted to any hospital within one week of your planned  procedure.     If you have any questions regarding these preoperative instructions you may call [phone]. If you have questions regarding you surgical procedure, or post-operative care/recovery please call your surgeon's office

## 2024-04-11 RX ORDER — ALBUTEROL SULFATE 90 UG/1
2 AEROSOL, METERED RESPIRATORY (INHALATION) EVERY 4 HOURS PRN
Qty: 8.5 G | Refills: 2 | Status: SHIPPED | OUTPATIENT
Start: 2024-04-11 | End: 2024-05-29

## 2024-04-11 ASSESSMENT — ENCOUNTER SYMPTOMS
BACK PAIN: 1
SHORTNESS OF BREATH: 1
NECK PAIN: 1
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
## 2024-04-17 ENCOUNTER — OFFICE VISIT (OUTPATIENT)
Dept: PAIN MEDICINE | Facility: CLINIC | Age: 54
End: 2024-04-17
Payer: OTHER MISCELLANEOUS

## 2024-04-17 DIAGNOSIS — M46.1 BILATERAL SACROILIITIS (CMS-HCC): ICD-10-CM

## 2024-04-17 DIAGNOSIS — M47.812 CERVICAL SPONDYLOSIS WITHOUT MYELOPATHY: ICD-10-CM

## 2024-04-17 DIAGNOSIS — M50.222 OTHER CERVICAL DISC DISPLACEMENT AT C5-C6 LEVEL: Primary | ICD-10-CM

## 2024-04-17 PROCEDURE — 99214 OFFICE O/P EST MOD 30 MIN: CPT | Performed by: PHYSICAL MEDICINE & REHABILITATION

## 2024-04-17 RX ORDER — NALOXONE HYDROCHLORIDE 4 MG/.1ML
4 SPRAY NASAL AS NEEDED
Qty: 2 EACH | Refills: 0 | Status: SHIPPED | OUTPATIENT
Start: 2024-04-17

## 2024-04-17 RX ORDER — TRAMADOL HYDROCHLORIDE 50 MG/1
50 TABLET ORAL 2 TIMES DAILY
Qty: 30 TABLET | Refills: 0 | Status: SHIPPED | OUTPATIENT
Start: 2024-04-17 | End: 2024-05-02

## 2024-04-17 NOTE — PROGRESS NOTES
Allowed conditions       · Other cervical disc displacement at C5-C6 level (722.0) (M50.222)   · Right cervical radiculopathy (723.4) (M54.12)   · Cervical spondylosis without myelopathy (721.0) (M47.812)      Chief complaint  Lower back pain worse on the R side    History  Suyapa Thao is back for pain management office visit  The injection from last September is wearing off  Now pain is back on the R side The pain in the back is deep on the right  side.  The pain is below the belt line, it is continuous but it gets worse with extension of the lumbar spine and with leading for the right leg.  It improves with leaning forward.  Sitting up increases the pain. Improves with laying on the side. There are no reported radiation of the pain to the lower limbs.     No bowel or bladder incontinence.  No sensory or motor changes in the lower limbs.    No changes or in the color or textures of the skin of the lower limbs.   Similar findings on the left side    Also she has hoarse voice from paralysed vocal cords after removing polyps in 2015. She has PTSD from service and was inquiring about SGB, however I dw her about that and about potential complications with vocal cords injury and she is going to consider that.      Pain level at rest is 4 to 5 /10 , with the agg pain level 7/10.     Review of Systems     Denied any fever or chills. No weight loss and no night sweats. No cough or sputum production. No diarrhea   The constipation has been responding to fibers and over the counter medications.     No bladder and bowel incontinence and no other changes in bladder and bowel. No skin changes.  Reports tiredness and fatigability only if the pain is not controlled.   Denied opioids diversion and abuse and denies alcoholism. Denies overuse of the pain medications.       Physical examination  Awake, alert and oriented for time place and persons   declined Chaperone for the visit and was adequately  draped for the exam.    Examination of the lumbar spine and the SIJ area showed mild reversal of the lumbar lordosis with slight scoliosis with right-sided concavity.  The scoliosis slightly corrected upon bending of the lumbar spine.  Tight muscle bands over the right  lower lumbar area and over the upper buttock area.  Gaenslen and Dexter are positive on the right side. Also compression test of the right SIJ is positive increasing the pain.   Sahni test negative with no pain upon provocative testing of the lower facet joints  Straight leg raising was negative on both sides.  No sensorimotor deficits in the lower limbs.  Gume testing were negative for axial loading and log rotation.  No aberrant pain behavior.    Seh has similar findings on the left side as well    Diagnosis  Problem List Items Addressed This Visit       Cervical spondylosis without myelopathy    Relevant Medications    traMADol (Ultram) 50 mg tablet    naloxone (Narcan) 4 mg/0.1 mL nasal spray    Other Relevant Orders    Referral to Physical Therapy    Bilateral sacroiliitis (CMS-HCC)    Relevant Medications    traMADol (Ultram) 50 mg tablet    naloxone (Narcan) 4 mg/0.1 mL nasal spray    Other Relevant Orders    Referral to Physical Therapy    Sacroiliac Joint Injection    Other cervical disc displacement at C5-C6 level - Primary    Relevant Medications    traMADol (Ultram) 50 mg tablet    naloxone (Narcan) 4 mg/0.1 mL nasal spray    Other Relevant Orders    Referral to Physical Therapy        Plan  Reviewed the pain generators.  Went over the types of pain with neuropathic and nociceptive and different pathologies and therapeutic modalities. Discussed the mechanism of action of interventions from acupuncture, physical therapy , regular exercises, injections, botox, spinal cord stimulation, and role of surgery     Went over pathology of the intervertebral disc displacement and the anatomical relation to the Nerve roots and relation to the radicular symptoms. Went  over treatment modalities with conservative treatment including acupuncture   and epidural steroid injection with fluoroscopy guidance and last resort of surgery    Also dw her about the SIJ and that is secondary to the back pain .   She did well on injections last year    Repeat SIJ intraarticular steroid injection bilaterally Risks not limited to infection, sepsis, abscess, bleeding , nerve injury, paralysis, and death...   Tramadol for pain until we can do the injeciton          The level of clinical decision making in this office visit,  is high, given the high risks of complications with the morbidity and mortality due to the fact that acute and chronic pain may pose a threat to life and bodily function, if under treated, poorly treated, or with failure to maintain adequate treatment and timely medical follow up. Additionally over treatment has its own set of complications including overdosing on the pain medications and also the habit forming potentials with the use of the medications used to treat chronic painful conditions including therapeutic classes classified as dangerous medications. Given the serious and fluctuating nature of pain level and instensity with extensive consideration for whenever pain changes, there is always the risk of prolonged functional impairment requiring close patient monitoring with regular assessments and reassessments and high level medical decision making at every office visit. The amount and complexity of data reviewed is high given the patient clinical presentation, labs,  data, radiology reports, and other tests as discussed during office visits. Pertinent data whether positive or negative were taken in consideration in the process of making this high level medical decision.

## 2024-05-02 ENCOUNTER — TELEPHONE (OUTPATIENT)
Dept: OTOLARYNGOLOGY | Facility: HOSPITAL | Age: 54
End: 2024-05-02
Payer: MEDICARE

## 2024-05-02 NOTE — TELEPHONE ENCOUNTER
Pt called stating she has had a cold, sore throat, and is coughing up thick yellow mucous for the past two days. Pt states she has not had any fevers and has not seen her PCP or urgent care. Pt states it is getting better, but she wanted to know if she should still have her procedure on 5/6/24 with Dr. Kenney? Advised pt I would notify Dr. Kenney and give her a call back. Pt stated thank you. Dr. Kenney notified at this time.    Addendum - Called pt back at this time and informed her that Dr. Kenney states it is best to delay surgery. Confirmed new surgery date of 5/20 with pt at this time. Pt stated ok thank you.

## 2024-05-14 ENCOUNTER — TELEMEDICINE (OUTPATIENT)
Dept: BEHAVIORAL HEALTH | Facility: CLINIC | Age: 54
End: 2024-05-14
Payer: OTHER MISCELLANEOUS

## 2024-05-14 ENCOUNTER — APPOINTMENT (OUTPATIENT)
Dept: UROLOGY | Facility: CLINIC | Age: 54
End: 2024-05-14
Payer: OTHER MISCELLANEOUS

## 2024-05-14 DIAGNOSIS — F43.10 PTSD (POST-TRAUMATIC STRESS DISORDER): ICD-10-CM

## 2024-05-14 PROCEDURE — 99213 OFFICE O/P EST LOW 20 MIN: CPT | Performed by: PSYCHIATRY & NEUROLOGY

## 2024-05-14 RX ORDER — ESCITALOPRAM OXALATE 20 MG/1
20 TABLET ORAL DAILY
Qty: 90 TABLET | Refills: 3 | Status: SHIPPED | OUTPATIENT
Start: 2024-05-14 | End: 2025-05-14

## 2024-05-14 ASSESSMENT — ENCOUNTER SYMPTOMS
DYSPHORIC MOOD: 1
NERVOUS/ANXIOUS: 1
SLEEP DISTURBANCE: 1
AGITATION: 1

## 2024-05-14 NOTE — ASSESSMENT & PLAN NOTE
Increase Lexapro to 20 mg daily for better management of anxiety and depression symptoms.  Urged patient to cut down on amount of time she spends following the  conflict proceeds.  Follow-up 2 months

## 2024-05-15 ENCOUNTER — OFFICE VISIT (OUTPATIENT)
Dept: UROLOGY | Facility: CLINIC | Age: 54
End: 2024-05-15

## 2024-05-15 ENCOUNTER — TELEPHONE (OUTPATIENT)
Dept: BEHAVIORAL HEALTH | Facility: CLINIC | Age: 54
End: 2024-05-15

## 2024-05-15 VITALS — DIASTOLIC BLOOD PRESSURE: 74 MMHG | HEART RATE: 93 BPM | SYSTOLIC BLOOD PRESSURE: 106 MMHG

## 2024-05-15 DIAGNOSIS — N39.46 MIXED STRESS AND URGE URINARY INCONTINENCE: Primary | ICD-10-CM

## 2024-05-15 DIAGNOSIS — N95.8 GENITOURINARY SYNDROME OF MENOPAUSE: ICD-10-CM

## 2024-05-15 DIAGNOSIS — N32.81 OAB (OVERACTIVE BLADDER): ICD-10-CM

## 2024-05-15 LAB
POC BILIRUBIN, URINE: NEGATIVE
POC BLOOD, URINE: ABNORMAL
POC GLUCOSE, URINE: NEGATIVE MG/DL
POC KETONES, URINE: NEGATIVE MG/DL
POC LEUKOCYTES, URINE: NEGATIVE
POC NITRITE,URINE: NEGATIVE
POC PH, URINE: 7 PH
POC PROTEIN, URINE: NEGATIVE MG/DL
POC SPECIFIC GRAVITY, URINE: 1.01
POC UROBILINOGEN, URINE: 0.2 EU/DL

## 2024-05-15 PROCEDURE — 2000F BLOOD PRESSURE MEASURE: CPT

## 2024-05-15 PROCEDURE — 81002 URINALYSIS NONAUTO W/O SCOPE: CPT

## 2024-05-15 PROCEDURE — 51798 US URINE CAPACITY MEASURE: CPT

## 2024-05-15 PROCEDURE — 4004F PT TOBACCO SCREEN RCVD TLK: CPT

## 2024-05-15 PROCEDURE — 99214 OFFICE O/P EST MOD 30 MIN: CPT

## 2024-05-15 RX ORDER — MIRABEGRON 50 MG/1
50 TABLET, EXTENDED RELEASE ORAL DAILY
Qty: 84 TABLET | Refills: 0 | COMMUNITY
Start: 2024-05-15 | End: 2024-08-07

## 2024-05-15 NOTE — H&P (VIEW-ONLY)
[facility]  Outpatient Clinic Note    Patient: Suyapa Thao  Age/Sex: 53 y.o., female  MRN: [id number]  Referred by:      Chief Complaint:  urinary incontinence          History of Present Illness  This is a 53 y.o. female,  who presents as a new patient to the clinic for mixed urinary incontinence. The patient stated UUI is more bothersome than TEETEE. She has urinary urgency and frequency as well, she previously tried Oxybutynin that caused dry mouth and did not improve her symptoms. She wakes up 3-4 times a night to go to the bathroom. The patient had a MRI performed on 9/15/2023 showing the presence of uterine adenomyosis. She is taking progestin-only oral contraceptive pill for menstrual control along with using Vagifem for vaginal dryness. The patient had an endometrial biopsy by Dr. Graham resulting in an endometrial polyp. She denies dysuria, gross hematuria, flank pain, pelvic pain, vaginal bulging, fever or chills. The patient stated her bowel movements are normal and daily. She is sexually active. No history of breast cancer. She is a cigarette smoker, smoking 6-8 cigarettes a day for the past 20 years. She denies any pelvic surgeries.         Gyn History:  - Menopausal: No           Postmenopausal bleeding: No  - Hysterectomy: No  - Pap up to date: Yes - 2021  - Sexually active:  Yes  Dyspareunia: No   Other issues: vaginal dryness  - Number of prior vaginal deliveries: 1  - Number of prior c-sections: 0      OB History          5    Para   1    Term                AB   4    Living   1         SAB        IAB   4    Ectopic        Multiple        Live Births   1                 Past Medical & Surgical History  Past Medical History:   Diagnosis Date    ADHD (attention deficit hyperactivity disorder)     Adjustment disorder     Anxiety     Chronic pain disorder     Depression     Hallucination     Memory loss     Panic attack     Post-traumatic stress disorder, unspecified  08/02/2022    PTSD (post-traumatic stress disorder)    Psychiatric illness     PTSD (post-traumatic stress disorder)     Sleep difficulties      Past Surgical History:   Procedure Laterality Date    OTHER SURGICAL HISTORY  03/29/2022    Vocal Cordectomy    OTHER SURGICAL HISTORY  03/29/2022    Breast surgery/ augmentation       Family History  Family History   Problem Relation Name Age of Onset    Hypertension Mother      Heart disease Father      Diabetes Father      Hypertension Father         Social History  She reports that she has been smoking cigarettes. She has never used smokeless tobacco. She reports that she does not currently use alcohol. She reports that she does not use drugs.    Allergies  Patient has no known allergies.    Medications:  Current Outpatient Medications on File Prior to Visit   Medication Sig Dispense Refill    albuterol (ProAir HFA) 90 mcg/actuation inhaler Inhale 2 puffs every 4 hours if needed for wheezing or shortness of breath. 8.5 g 2    clonazePAM (KlonoPIN) 1 mg tablet Take 1 tablet (1 mg) by mouth 2 times a day. 180 tablet 0    escitalopram (Lexapro) 20 mg tablet Take 1 tablet (20 mg) by mouth once daily. 90 tablet 3    estradiol (Vagifem) 10 mcg tablet vaginal tablet Insert 1 tablet (10 mcg) into the vagina once daily at bedtime. Twice a week. 14 tablet 3    Incassia 0.35 mg tablet Take 1 tablet (0.35 mg) by mouth once daily.      naloxone (Narcan) 4 mg/0.1 mL nasal spray Administer 1 spray (4 mg) into affected nostril(s) if needed for opioid reversal for up to 2 doses. May repeat every 2-3 minutes if needed, alternating nostrils, until medical assistance becomes available. 2 each 0    norethindrone (Lyleq) 0.35 mg tablet Take 1 tablet (0.35 mg) by mouth once daily. 28 tablet 12    ospemifene (Osphena) 60 mg tablet Take 1 tablet by mouth once daily.      prasterone, dhea, (Intrarosa) 6.5 mg insert Insert 6.5 mg into the vagina once daily. (Patient not taking: Reported on  4/9/2024) 30 each 3    prazosin (Minipress) 5 mg capsule Take 1 capsule (5 mg) by mouth once daily at bedtime. For nightmares 90 capsule 3    QUEtiapine (SEROquel) 200 mg tablet Take 1 tablet (200 mg) by mouth once daily at bedtime. 90 tablet 3    risperiDONE (RisperDAL) 1 mg tablet TAKE 1 TABLET BY MOUTH EVERY MORNING AND TAKE 1 TABLET BY MOUTH EVERY EVENING. 180 tablet 3    rosuvastatin (Crestor) 5 mg tablet Take 1 tablet (5 mg) by mouth once daily. 90 tablet 1    tiZANidine (Zanaflex) 2 mg tablet Take 1 tablet (2 mg) by mouth 3 times a day.      [DISCONTINUED] escitalopram (Lexapro) 10 mg tablet Take 1 tablet (10 mg) by mouth once daily. 30 tablet 11    [DISCONTINUED] escitalopram (Lexapro) 20 mg tablet Take 1 tablet (20 mg) by mouth once daily. 90 tablet 3     No current facility-administered medications on file prior to visit.        Review of Systems   A comprehensive 10+ review of systems was negative except for: see hpi          Physical Exam                                                                                                                      General: Well developed, well nourished, alert and cooperative, appears in no acute distress  Head: Normocephalic, atraumatic  Neck: supple, trachea midline  Eyes: Non-injected conjunctiva, sclera clear, no proptosis  Cardiac: Extremities are warm and well perfused. No edema, cyanosis or pallor.   Lungs: Breathing is easy, non-labored. Speaking in clear and complete sentences. Normal diaphragmatic movement.  Abdomen: soft, non-distended, non-tender, no rebound or guarding, no hernia and no CVA tenderness   MSK: Ambulatory with steady gait, unassisted  Neuro: alert and oriented to person, place and time  Psych: Demonstrates good judgement and reason, without hallucinations, abnormal affect or abnormal behaviors.  Skin: no obvious lesions, no rashes    PVR (by Ultrasound): 141mL   Urine dip:   Recent Results (from the past 6 hour(s))   POCT UA  (nonautomated) manually resulted    Collection Time: 05/15/24  1:47 PM   Result Value Ref Range    POC Glucose, Urine NEGATIVE NEGATIVE mg/dl    POC Bilirubin, Urine NEGATIVE NEGATIVE    POC Ketones, Urine NEGATIVE NEGATIVE mg/dl    POC Specific Gravity, Urine 1.015 1.005 - 1.035    POC Blood, Urine SMALL (1+) (A) NEGATIVE    POC PH, Urine 7.0 No Reference Range Established PH    POC Protein, Urine NEGATIVE NEGATIVE, 30 (1+) mg/dl    POC Urobilinogen, Urine 0.2 0.2, 1.0 EU/DL    Poc Nitrite, Urine NEGATIVE NEGATIVE    POC Leukocytes, Urine NEGATIVE NEGATIVE       Labs  N/A    Imaging  N/A      IMPRESSION AND PLAN:  Suyapa Thao is a 53 y.o. MICHAEL, OAB, GSM.     MICHAEL: Urge dominant  -discussed mechanism of UUI and TEETEE, and treatment options for both including PFT, pessary, sling for TEETEE and PFT, pharmacotherapy and third-line therapy for OAB  -Education handouts given to patient on treatment options  Bulkamid is associated with slightly less success rate of a sling about 60 to 70% of women having >90% improvement. However, there seems to be similar long-term success compared to sling with fewer side-effects. Main AE is urinary retention which resolves within 24 hours of using a 10-12 [place] catheter.  I discussed that if she still has some leakage after her procedure, she could perform another injection within 4 weeks and this procedure being performed in the office.     We discussed the sling procedure in depth with her there is a long-term success rate of 70-80% complete continence and up to 90% significant improvement up to 10 years after surgery, though the sling is meant to last a lifetime, there is a <5% risk of subsequent surgery, either revision or excision within 9 years. The major complications include bladder perforation with sling placement <1%, retention requiring sling lysis 1-3%, transient retention requiring 2-3 days of catheter drainage 33%, and mesh erosion 1-3%.     OAB  -Failed Oxybutynin  -We will  Myrbetriq  -we discussed botox vs sacral neuromodulation: both have similar efficacy 80% patients reports >50% improvement, botox associated with 5% risk of incomplete emptying, increase in UTI and will require re-injection in 6-9 months; and as early as 3 months. SNM is a staged procedure, 2 weeks apart, consisting first of lead implantation then internalization of IPG if there is improvement. Interstim is associated with lead migration, explantation, infection and bleeding, though risks are all <5%. We also discussed PTNS which is associated with success rates comparable to medical therapy but without side-effects without significant major morbidity.     GSM  -Continue on Vagifem    Follow up in 3 months    All questions and concerns were answered and addressed.  The patient expressed understanding and agrees with the plan.     Reviewed and approved by ANGELIQUE ALONZO on 5/15/24 at 1:48 PM.

## 2024-05-15 NOTE — PROGRESS NOTES
[facility]  Outpatient Clinic Note    Patient: Suyapa Thao  Age/Sex: 53 y.o., female  MRN: [id number]  Referred by:      Chief Complaint:  urinary incontinence          History of Present Illness  This is a 53 y.o. female,  who presents as a new patient to the clinic for mixed urinary incontinence. The patient stated UUI is more bothersome than TEETEE. She has urinary urgency and frequency as well, she previously tried Oxybutynin that caused dry mouth and did not improve her symptoms. She wakes up 3-4 times a night to go to the bathroom. The patient had a MRI performed on 9/15/2023 showing the presence of uterine adenomyosis. She is taking progestin-only oral contraceptive pill for menstrual control along with using Vagifem for vaginal dryness. The patient had an endometrial biopsy by Dr. Graham resulting in an endometrial polyp. She denies dysuria, gross hematuria, flank pain, pelvic pain, vaginal bulging, fever or chills. The patient stated her bowel movements are normal and daily. She is sexually active. No history of breast cancer. She is a cigarette smoker, smoking 6-8 cigarettes a day for the past 20 years. She denies any pelvic surgeries.         Gyn History:  - Menopausal: No           Postmenopausal bleeding: No  - Hysterectomy: No  - Pap up to date: Yes - 2021  - Sexually active:  Yes  Dyspareunia: No   Other issues: vaginal dryness  - Number of prior vaginal deliveries: 1  - Number of prior c-sections: 0      OB History          5    Para   1    Term                AB   4    Living   1         SAB        IAB   4    Ectopic        Multiple        Live Births   1                 Past Medical & Surgical History  Past Medical History:   Diagnosis Date    ADHD (attention deficit hyperactivity disorder)     Adjustment disorder     Anxiety     Chronic pain disorder     Depression     Hallucination     Memory loss     Panic attack     Post-traumatic stress disorder, unspecified  08/02/2022    PTSD (post-traumatic stress disorder)    Psychiatric illness     PTSD (post-traumatic stress disorder)     Sleep difficulties      Past Surgical History:   Procedure Laterality Date    OTHER SURGICAL HISTORY  03/29/2022    Vocal Cordectomy    OTHER SURGICAL HISTORY  03/29/2022    Breast surgery/ augmentation       Family History  Family History   Problem Relation Name Age of Onset    Hypertension Mother      Heart disease Father      Diabetes Father      Hypertension Father         Social History  She reports that she has been smoking cigarettes. She has never used smokeless tobacco. She reports that she does not currently use alcohol. She reports that she does not use drugs.    Allergies  Patient has no known allergies.    Medications:  Current Outpatient Medications on File Prior to Visit   Medication Sig Dispense Refill    albuterol (ProAir HFA) 90 mcg/actuation inhaler Inhale 2 puffs every 4 hours if needed for wheezing or shortness of breath. 8.5 g 2    clonazePAM (KlonoPIN) 1 mg tablet Take 1 tablet (1 mg) by mouth 2 times a day. 180 tablet 0    escitalopram (Lexapro) 20 mg tablet Take 1 tablet (20 mg) by mouth once daily. 90 tablet 3    estradiol (Vagifem) 10 mcg tablet vaginal tablet Insert 1 tablet (10 mcg) into the vagina once daily at bedtime. Twice a week. 14 tablet 3    Incassia 0.35 mg tablet Take 1 tablet (0.35 mg) by mouth once daily.      naloxone (Narcan) 4 mg/0.1 mL nasal spray Administer 1 spray (4 mg) into affected nostril(s) if needed for opioid reversal for up to 2 doses. May repeat every 2-3 minutes if needed, alternating nostrils, until medical assistance becomes available. 2 each 0    norethindrone (Lyleq) 0.35 mg tablet Take 1 tablet (0.35 mg) by mouth once daily. 28 tablet 12    ospemifene (Osphena) 60 mg tablet Take 1 tablet by mouth once daily.      prasterone, dhea, (Intrarosa) 6.5 mg insert Insert 6.5 mg into the vagina once daily. (Patient not taking: Reported on  4/9/2024) 30 each 3    prazosin (Minipress) 5 mg capsule Take 1 capsule (5 mg) by mouth once daily at bedtime. For nightmares 90 capsule 3    QUEtiapine (SEROquel) 200 mg tablet Take 1 tablet (200 mg) by mouth once daily at bedtime. 90 tablet 3    risperiDONE (RisperDAL) 1 mg tablet TAKE 1 TABLET BY MOUTH EVERY MORNING AND TAKE 1 TABLET BY MOUTH EVERY EVENING. 180 tablet 3    rosuvastatin (Crestor) 5 mg tablet Take 1 tablet (5 mg) by mouth once daily. 90 tablet 1    tiZANidine (Zanaflex) 2 mg tablet Take 1 tablet (2 mg) by mouth 3 times a day.      [DISCONTINUED] escitalopram (Lexapro) 10 mg tablet Take 1 tablet (10 mg) by mouth once daily. 30 tablet 11    [DISCONTINUED] escitalopram (Lexapro) 20 mg tablet Take 1 tablet (20 mg) by mouth once daily. 90 tablet 3     No current facility-administered medications on file prior to visit.        Review of Systems   A comprehensive 10+ review of systems was negative except for: see hpi          Physical Exam                                                                                                                      General: Well developed, well nourished, alert and cooperative, appears in no acute distress  Head: Normocephalic, atraumatic  Neck: supple, trachea midline  Eyes: Non-injected conjunctiva, sclera clear, no proptosis  Cardiac: Extremities are warm and well perfused. No edema, cyanosis or pallor.   Lungs: Breathing is easy, non-labored. Speaking in clear and complete sentences. Normal diaphragmatic movement.  Abdomen: soft, non-distended, non-tender, no rebound or guarding, no hernia and no CVA tenderness   MSK: Ambulatory with steady gait, unassisted  Neuro: alert and oriented to person, place and time  Psych: Demonstrates good judgement and reason, without hallucinations, abnormal affect or abnormal behaviors.  Skin: no obvious lesions, no rashes    PVR (by Ultrasound): 141mL   Urine dip:   Recent Results (from the past 6 hour(s))   POCT UA  (nonautomated) manually resulted    Collection Time: 05/15/24  1:47 PM   Result Value Ref Range    POC Glucose, Urine NEGATIVE NEGATIVE mg/dl    POC Bilirubin, Urine NEGATIVE NEGATIVE    POC Ketones, Urine NEGATIVE NEGATIVE mg/dl    POC Specific Gravity, Urine 1.015 1.005 - 1.035    POC Blood, Urine SMALL (1+) (A) NEGATIVE    POC PH, Urine 7.0 No Reference Range Established PH    POC Protein, Urine NEGATIVE NEGATIVE, 30 (1+) mg/dl    POC Urobilinogen, Urine 0.2 0.2, 1.0 EU/DL    Poc Nitrite, Urine NEGATIVE NEGATIVE    POC Leukocytes, Urine NEGATIVE NEGATIVE       Labs  N/A    Imaging  N/A      IMPRESSION AND PLAN:  Suyapa Thao is a 53 y.o. MICHAEL, OAB, GSM.     MICHAEL: Urge dominant  -discussed mechanism of UUI and TEETEE, and treatment options for both including PFT, pessary, sling for TEETEE and PFT, pharmacotherapy and third-line therapy for OAB  -Education handouts given to patient on treatment options  Bulkamid is associated with slightly less success rate of a sling about 60 to 70% of women having >90% improvement. However, there seems to be similar long-term success compared to sling with fewer side-effects. Main AE is urinary retention which resolves within 24 hours of using a 10-12 [place] catheter.  I discussed that if she still has some leakage after her procedure, she could perform another injection within 4 weeks and this procedure being performed in the office.     We discussed the sling procedure in depth with her there is a long-term success rate of 70-80% complete continence and up to 90% significant improvement up to 10 years after surgery, though the sling is meant to last a lifetime, there is a <5% risk of subsequent surgery, either revision or excision within 9 years. The major complications include bladder perforation with sling placement <1%, retention requiring sling lysis 1-3%, transient retention requiring 2-3 days of catheter drainage 33%, and mesh erosion 1-3%.     OAB  -Failed Oxybutynin  -We will  Myrbetriq  -we discussed botox vs sacral neuromodulation: both have similar efficacy 80% patients reports >50% improvement, botox associated with 5% risk of incomplete emptying, increase in UTI and will require re-injection in 6-9 months; and as early as 3 months. SNM is a staged procedure, 2 weeks apart, consisting first of lead implantation then internalization of IPG if there is improvement. Interstim is associated with lead migration, explantation, infection and bleeding, though risks are all <5%. We also discussed PTNS which is associated with success rates comparable to medical therapy but without side-effects without significant major morbidity.     GSM  -Continue on Vagifem    Follow up in 3 months    All questions and concerns were answered and addressed.  The patient expressed understanding and agrees with the plan.     Reviewed and approved by ANGELIQUE ALONZO on 5/15/24 at 1:48 PM.

## 2024-05-17 ENCOUNTER — ANESTHESIA EVENT (OUTPATIENT)
Dept: OPERATING ROOM | Facility: CLINIC | Age: 54
End: 2024-05-17
Payer: MEDICARE

## 2024-05-20 ENCOUNTER — HOSPITAL ENCOUNTER (OUTPATIENT)
Facility: CLINIC | Age: 54
Setting detail: OUTPATIENT SURGERY
Discharge: HOME | End: 2024-05-20
Attending: OTOLARYNGOLOGY | Admitting: OTOLARYNGOLOGY
Payer: MEDICARE

## 2024-05-20 ENCOUNTER — ANESTHESIA (OUTPATIENT)
Dept: OPERATING ROOM | Facility: CLINIC | Age: 54
End: 2024-05-20
Payer: MEDICARE

## 2024-05-20 VITALS
WEIGHT: 143.3 LBS | OXYGEN SATURATION: 98 % | DIASTOLIC BLOOD PRESSURE: 71 MMHG | HEIGHT: 63 IN | SYSTOLIC BLOOD PRESSURE: 117 MMHG | TEMPERATURE: 97.3 F | BODY MASS INDEX: 25.39 KG/M2 | HEART RATE: 73 BPM | RESPIRATION RATE: 16 BRPM

## 2024-05-20 DIAGNOSIS — R49.0 HOARSENESS OF VOICE: Primary | ICD-10-CM

## 2024-05-20 PROCEDURE — 7100000010 HC PHASE TWO TIME - EACH INCREMENTAL 1 MINUTE: Performed by: OTOLARYNGOLOGY

## 2024-05-20 PROCEDURE — 3600000002 HC OR TIME - INITIAL BASE CHARGE - PROCEDURE LEVEL TWO: Performed by: OTOLARYNGOLOGY

## 2024-05-20 PROCEDURE — 3700000002 HC GENERAL ANESTHESIA TIME - EACH INCREMENTAL 1 MINUTE: Performed by: OTOLARYNGOLOGY

## 2024-05-20 PROCEDURE — 2500000004 HC RX 250 GENERAL PHARMACY W/ HCPCS (ALT 636 FOR OP/ED): Performed by: NURSE ANESTHETIST, CERTIFIED REGISTERED

## 2024-05-20 PROCEDURE — 7100000009 HC PHASE TWO TIME - INITIAL BASE CHARGE: Performed by: OTOLARYNGOLOGY

## 2024-05-20 PROCEDURE — A31571 PR LARYNGOSCOPY,DIRECT,SCOPE,INJ CORDS: Performed by: STUDENT IN AN ORGANIZED HEALTH CARE EDUCATION/TRAINING PROGRAM

## 2024-05-20 PROCEDURE — 7100000002 HC RECOVERY ROOM TIME - EACH INCREMENTAL 1 MINUTE: Performed by: OTOLARYNGOLOGY

## 2024-05-20 PROCEDURE — 7100000001 HC RECOVERY ROOM TIME - INITIAL BASE CHARGE: Performed by: OTOLARYNGOLOGY

## 2024-05-20 PROCEDURE — 31571 LARYNGOSCOP W/VC INJ + SCOPE: CPT | Performed by: OTOLARYNGOLOGY

## 2024-05-20 PROCEDURE — 2500000004 HC RX 250 GENERAL PHARMACY W/ HCPCS (ALT 636 FOR OP/ED): Performed by: ANESTHESIOLOGY

## 2024-05-20 PROCEDURE — 2780000003 HC OR 278 NO HCPCS: Performed by: OTOLARYNGOLOGY

## 2024-05-20 PROCEDURE — 3600000007 HC OR TIME - EACH INCREMENTAL 1 MINUTE - PROCEDURE LEVEL TWO: Performed by: OTOLARYNGOLOGY

## 2024-05-20 PROCEDURE — 2500000004 HC RX 250 GENERAL PHARMACY W/ HCPCS (ALT 636 FOR OP/ED): Performed by: OTOLARYNGOLOGY

## 2024-05-20 PROCEDURE — A4217 STERILE WATER/SALINE, 500 ML: HCPCS | Performed by: OTOLARYNGOLOGY

## 2024-05-20 PROCEDURE — A31571 PR LARYNGOSCOPY,DIRECT,SCOPE,INJ CORDS: Performed by: NURSE ANESTHETIST, CERTIFIED REGISTERED

## 2024-05-20 PROCEDURE — 3700000001 HC GENERAL ANESTHESIA TIME - INITIAL BASE CHARGE: Performed by: OTOLARYNGOLOGY

## 2024-05-20 PROCEDURE — 2500000005 HC RX 250 GENERAL PHARMACY W/O HCPCS: Performed by: NURSE ANESTHETIST, CERTIFIED REGISTERED

## 2024-05-20 DEVICE — IMPLANTABLE DEVICE
Type: IMPLANTABLE DEVICE | Site: VOCAL CORD | Status: FUNCTIONAL
Brand: RESTYLANE

## 2024-05-20 RX ORDER — FENTANYL CITRATE 50 UG/ML
50 INJECTION, SOLUTION INTRAMUSCULAR; INTRAVENOUS EVERY 5 MIN PRN
Status: DISCONTINUED | OUTPATIENT
Start: 2024-05-20 | End: 2024-05-20 | Stop reason: HOSPADM

## 2024-05-20 RX ORDER — METOCLOPRAMIDE HYDROCHLORIDE 5 MG/ML
10 INJECTION INTRAMUSCULAR; INTRAVENOUS ONCE AS NEEDED
Status: DISCONTINUED | OUTPATIENT
Start: 2024-05-20 | End: 2024-05-20 | Stop reason: HOSPADM

## 2024-05-20 RX ORDER — DEXAMETHASONE SODIUM PHOSPHATE 100 MG/10ML
INJECTION INTRAMUSCULAR; INTRAVENOUS AS NEEDED
Status: DISCONTINUED | OUTPATIENT
Start: 2024-05-20 | End: 2024-05-20

## 2024-05-20 RX ORDER — SODIUM CHLORIDE 0.9 G/100ML
IRRIGANT IRRIGATION AS NEEDED
Status: DISCONTINUED | OUTPATIENT
Start: 2024-05-20 | End: 2024-05-20 | Stop reason: HOSPADM

## 2024-05-20 RX ORDER — LIDOCAINE HYDROCHLORIDE 20 MG/ML
INJECTION, SOLUTION INFILTRATION; PERINEURAL AS NEEDED
Status: DISCONTINUED | OUTPATIENT
Start: 2024-05-20 | End: 2024-05-20

## 2024-05-20 RX ORDER — MIDAZOLAM HYDROCHLORIDE 1 MG/ML
INJECTION, SOLUTION INTRAMUSCULAR; INTRAVENOUS AS NEEDED
Status: DISCONTINUED | OUTPATIENT
Start: 2024-05-20 | End: 2024-05-20

## 2024-05-20 RX ORDER — LABETALOL HYDROCHLORIDE 5 MG/ML
5 INJECTION, SOLUTION INTRAVENOUS ONCE AS NEEDED
Status: DISCONTINUED | OUTPATIENT
Start: 2024-05-20 | End: 2024-05-20 | Stop reason: HOSPADM

## 2024-05-20 RX ORDER — ROCURONIUM BROMIDE 10 MG/ML
INJECTION, SOLUTION INTRAVENOUS AS NEEDED
Status: DISCONTINUED | OUTPATIENT
Start: 2024-05-20 | End: 2024-05-20

## 2024-05-20 RX ORDER — ALBUTEROL SULFATE 0.83 MG/ML
2.5 SOLUTION RESPIRATORY (INHALATION) ONCE AS NEEDED
Status: DISCONTINUED | OUTPATIENT
Start: 2024-05-20 | End: 2024-05-20 | Stop reason: HOSPADM

## 2024-05-20 RX ORDER — PROPOFOL 10 MG/ML
INJECTION, EMULSION INTRAVENOUS AS NEEDED
Status: DISCONTINUED | OUTPATIENT
Start: 2024-05-20 | End: 2024-05-20

## 2024-05-20 RX ORDER — ONDANSETRON HYDROCHLORIDE 2 MG/ML
INJECTION, SOLUTION INTRAVENOUS AS NEEDED
Status: DISCONTINUED | OUTPATIENT
Start: 2024-05-20 | End: 2024-05-20

## 2024-05-20 RX ORDER — ACETAMINOPHEN 325 MG/1
650 TABLET ORAL EVERY 4 HOURS PRN
Status: DISCONTINUED | OUTPATIENT
Start: 2024-05-20 | End: 2024-05-20 | Stop reason: HOSPADM

## 2024-05-20 RX ORDER — ONDANSETRON HYDROCHLORIDE 2 MG/ML
4 INJECTION, SOLUTION INTRAVENOUS ONCE AS NEEDED
Status: DISCONTINUED | OUTPATIENT
Start: 2024-05-20 | End: 2024-05-20 | Stop reason: HOSPADM

## 2024-05-20 RX ORDER — LIDOCAINE IN NACL,ISO-OSMOT/PF 30 MG/3 ML
0.1 SYRINGE (ML) INJECTION ONCE
Status: DISCONTINUED | OUTPATIENT
Start: 2024-05-20 | End: 2024-05-20 | Stop reason: HOSPADM

## 2024-05-20 RX ORDER — FENTANYL CITRATE 50 UG/ML
25 INJECTION, SOLUTION INTRAMUSCULAR; INTRAVENOUS EVERY 5 MIN PRN
Status: DISCONTINUED | OUTPATIENT
Start: 2024-05-20 | End: 2024-05-20 | Stop reason: HOSPADM

## 2024-05-20 RX ORDER — SODIUM CHLORIDE, SODIUM LACTATE, POTASSIUM CHLORIDE, CALCIUM CHLORIDE 600; 310; 30; 20 MG/100ML; MG/100ML; MG/100ML; MG/100ML
INJECTION, SOLUTION INTRAVENOUS CONTINUOUS PRN
Status: DISCONTINUED | OUTPATIENT
Start: 2024-05-20 | End: 2024-05-20

## 2024-05-20 RX ORDER — SODIUM CHLORIDE, SODIUM LACTATE, POTASSIUM CHLORIDE, CALCIUM CHLORIDE 600; 310; 30; 20 MG/100ML; MG/100ML; MG/100ML; MG/100ML
100 INJECTION, SOLUTION INTRAVENOUS CONTINUOUS
Status: DISCONTINUED | OUTPATIENT
Start: 2024-05-20 | End: 2024-05-20 | Stop reason: HOSPADM

## 2024-05-20 RX ORDER — FENTANYL CITRATE 50 UG/ML
INJECTION, SOLUTION INTRAMUSCULAR; INTRAVENOUS AS NEEDED
Status: DISCONTINUED | OUTPATIENT
Start: 2024-05-20 | End: 2024-05-20

## 2024-05-20 RX ADMIN — DEXAMETHASONE SODIUM PHOSPHATE 4 MG: 10 INJECTION INTRAMUSCULAR; INTRAVENOUS at 08:51

## 2024-05-20 RX ADMIN — LIDOCAINE HYDROCHLORIDE 60 MG: 20 INJECTION, SOLUTION INFILTRATION; PERINEURAL at 08:50

## 2024-05-20 RX ADMIN — SODIUM CHLORIDE, SODIUM LACTATE, POTASSIUM CHLORIDE, AND CALCIUM CHLORIDE: .6; .31; .03; .02 INJECTION, SOLUTION INTRAVENOUS at 08:36

## 2024-05-20 RX ADMIN — SUGAMMADEX 400 MG: 100 INJECTION, SOLUTION INTRAVENOUS at 09:10

## 2024-05-20 RX ADMIN — MIDAZOLAM 2 MG: 1 INJECTION INTRAMUSCULAR; INTRAVENOUS at 08:47

## 2024-05-20 RX ADMIN — ROCURONIUM BROMIDE 50 MG: 50 INJECTION INTRAVENOUS at 08:51

## 2024-05-20 RX ADMIN — FENTANYL CITRATE 100 MCG: 50 INJECTION, SOLUTION INTRAMUSCULAR; INTRAVENOUS at 08:50

## 2024-05-20 RX ADMIN — PROPOFOL 140 MG: 10 INJECTION, EMULSION INTRAVENOUS at 08:50

## 2024-05-20 RX ADMIN — ONDANSETRON 4 MG: 2 INJECTION INTRAMUSCULAR; INTRAVENOUS at 08:51

## 2024-05-20 RX ADMIN — SODIUM CHLORIDE, POTASSIUM CHLORIDE, SODIUM LACTATE AND CALCIUM CHLORIDE 100 ML/HR: 600; 310; 30; 20 INJECTION, SOLUTION INTRAVENOUS at 08:10

## 2024-05-20 SDOH — HEALTH STABILITY: MENTAL HEALTH: CURRENT SMOKER: 1

## 2024-05-20 ASSESSMENT — COLUMBIA-SUICIDE SEVERITY RATING SCALE - C-SSRS
6. HAVE YOU EVER DONE ANYTHING, STARTED TO DO ANYTHING, OR PREPARED TO DO ANYTHING TO END YOUR LIFE?: NO
2. HAVE YOU ACTUALLY HAD ANY THOUGHTS OF KILLING YOURSELF?: NO
1. IN THE PAST MONTH, HAVE YOU WISHED YOU WERE DEAD OR WISHED YOU COULD GO TO SLEEP AND NOT WAKE UP?: NO

## 2024-05-20 ASSESSMENT — PAIN - FUNCTIONAL ASSESSMENT
PAIN_FUNCTIONAL_ASSESSMENT: 0-10

## 2024-05-20 ASSESSMENT — PAIN SCALES - GENERAL
PAINLEVEL_OUTOF10: 3
PAINLEVEL_OUTOF10: 3
PAINLEVEL_OUTOF10: 0 - NO PAIN
PAINLEVEL_OUTOF10: 3
PAIN_LEVEL: 2

## 2024-05-20 NOTE — DISCHARGE INSTRUCTIONS
You had  vocal cord injections.  OK to use your voice after 48 hours.  Avoid cough and clear - use tylenol or advil if that is hard to control, along with sips of water.  When you start voicing, please do so conservatively, avoiding yelling, shouting, or throat clearing/cough.     Use Tylenol or advil for pain.     My office will call tomorrow to schedule your follow up which will be in 7-10 days with Zora REN.  I will see you back in 6 weeks.   If you have further questions, please call my office at [phone], press 2 and ask for my  or nurse. If after hours, please call the [phone] and ask for the ENT resident on call.

## 2024-05-20 NOTE — OP NOTE
OPERATIVE NOTE     Date:  2024 OR Location: [facility] [place] [facility] OR    Name: Suyapa Thao : 1970, Age: 53 y.o., MRN: [id number], Sex: female      Surgeons      Lois Kenney MD    Resident/Fellow/Other Assistant:  None were associated with this case.    Anesthesia: General  ASA: III  Anesthesia Staff: Anesthesiologist: Piper Nichole MD  CRNA: NOEL Piedra-CRNA  Staff: Circulator: Megha Parmar RN  Scrub Person: Lili Dutta         Preoperative Diagnosis:  Hoarseness  2.   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 Microdirect laryngoscopy with injection, CPT 33968- Bilateral with restylane  2. Microdirect laryngoscopy with injection, left with saline for distention.     Indications:  Suyapa Thao is a 53 y.o. female who presents with hoarseness with Bilateral vocal cord weakness with glottic insuffiency and desire for vocal rehabilitation.  The risks, indications, and complications of surgery were discussed including, but not limited to bleeding and infection, damage to surrounding structures including the teeth, gums, lips tongue, surrounding muscles, nerves, blood vessels as well as scarring. We further discussed the possibility of change in voice with persistent or worsened hoarseness, swallowing difficulty, breathing difficulty, taste change that may be long lasting,  medical complications and risks of anesthesia.     Operative Findings:  1. Laryngoscopic view: normal.  2. Injection with : saline and restylane    Operative Technique:   The patient was brought back to the operating room and transferred to the operating room table.  The patient was pre-oxygenated with 100% oxygen via facemask.  Following time-out the patient received anesthesia and was intubated with 5-0 laser MLT tube.  The bed was turned 90° to the laryngology  team.    An initial diagnostic laryngoscopy with microscope was performed which revealed a normal vallecula, bilateral pyriforms, immediate subglottis and no gross lesions or masses.    The Dedo laryngoscope was advanced until the vocal cords were visualized and suspended.  The microscope was moved in position, and utilized for remainder of the procedure.  Appropriate eye and face protections was applied to the patient.  Under microscopic guidance, the bilateral vocal cords were significantly indurated with visible vasculature throughout.  Next, the  left vocal cord was closely inspected,with evidence of a sulcus on the medial surface.   I then injected the left vocal cord in the subepithelial plane with preservative free saline.  This initially did not distend the sulcus, which confirmed it presence.  However, with slow injection, this site did eventually distend.  I injected the subepithelial plane twice with total of 2cc of saline.  Next, restylane was injected lateral to the bilateral vocal cords.  The right vocal cord had .3cc injected and the right .7cc injected both with good medial firmness and distention. An LTA was applied.      The patient tolerated this well.  The patient was then turned back to Anesthesia for extubation and returned to the recovery room in satisfactory condition.  Sponge, needle, instrument counts were reported as correct.  Estimated blood loss was minimal.     Rationale for -22 modifier:  This procedure took considerably longer due to treatment to the bilateral vocal cords, and multiple injections.      Attending Attestation: I performed the procedure.    Estimated Blood Loss: None  Complications: none  Condition of the patient: Stable  Disposition: PACU      Lois Kenney MD FACS

## 2024-05-20 NOTE — ANESTHESIA POSTPROCEDURE EVALUATION
Patient: Suyapa Thao    Procedure Summary       Date: 05/20/24 [facility] [place] [facility]    Anesthesia Start: 0835 Anesthesia Stop: 0927    Procedure: Microlaryngoscopy with left > right restylane injection with restylane, saline injection to left vocal cord (Bilateral) Diagnosis:       Hoarseness of voice      (Hoarseness of voice [R49.0])    Surgeons: Lois Kenney MD Responsible Provider: Piper Nichole MD    Anesthesia Type: general ASA Status: 3            Anesthesia Type: general    Vitals Value Taken Time   /62 05/20/24 0927   Temp 36.2 05/20/24 0927   Pulse 77 05/20/24 0927   Resp 16 05/20/24 0927   SpO2 100 05/20/24 0927       Anesthesia Post Evaluation    Patient location during evaluation: PACU  Patient participation: complete - patient participated  Level of consciousness: awake and alert  Pain score: 2  Pain management: adequate  Multimodal analgesia pain management approach  Airway patency: patent  Two or more strategies used to mitigate risk of obstructive sleep apnea  Cardiovascular status: acceptable and stable  Respiratory status: acceptable and face mask  Hydration status: acceptable  Postoperative Nausea and Vomiting: none        There were no known notable events for this encounter.

## 2024-05-20 NOTE — ANESTHESIA PROCEDURE NOTES
Airway  Date/Time: 5/20/2024 8:54 AM  Urgency: elective      Staffing  Performed: CRNA   Authorized by: Piper Nichole MD    Performed by: NOEL Piedra-MARY  Patient location during procedure: OR    Indications and Patient Condition  Indications for airway management: anesthesia and airway protection  Spontaneous Ventilation: absent  Sedation level: deep  Preoxygenated: yes  Patient position: sniffing  Mask difficulty assessment: 1 - vent by mask  Planned trial extubation    Final Airway Details  Final airway type: endotracheal airway      Successful airway: ETT  Cuffed: yes   Successful intubation technique: video laryngoscopy  Facilitating devices/methods: intubating stylet  Endotracheal tube insertion site: oral  Blade size: #3  ETT size (mm): 5.0 (MLT)  Cormack-Lehane Classification: grade I - full view of glottis  Placement verified by: chest auscultation and capnometry   Measured from: lips  ETT to lips (cm): 23  Number of attempts at approach: 1    Additional Comments  Elective glidescope intubation with 5.0 MLT. Lips and teeth in preinduction condition.

## 2024-05-20 NOTE — ANESTHESIA PREPROCEDURE EVALUATION
Patient: Suyapa Thao    Procedure Information       Date/Time: 05/20/24 0830    Procedure: Microlaryngoscopy with left > right restylane injection with restylane, saline injection to left vocal cord (Bilateral)    Location: [facility] OR 01 / Virtual [facility] OR    Surgeons: Lois Kenney MD            Relevant Problems   Anesthesia (within normal limits)      Cardiac   (+) Hyperlipidemia      Pulmonary   (+) COPD, mild (Multi)      Neuro   (+) Depression, recurrent (CMS-HCC)   (+) PTSD (post-traumatic stress disorder)      Hematology   (+) Mild anemia      Musculoskeletal   (+) Cervical spondylosis without myelopathy   (+) Lumbar spondylosis   (+) Myofascial pain syndrome of lumbar spine      HEENT   (+) Sensorineural hearing loss (SNHL) of both ears       Clinical information reviewed:   Tobacco  Allergies  Meds   Med Hx  Surg Hx  OB Status  Fam Hx  Soc   Hx        NPO Detail:  NPO/Void Status  Date of Last Liquid: 05/20/24  Time of Last Liquid: 0010  Date of Last Solid: 05/19/24  Time of Last Solid: 2100         Physical Exam    Airway  Mallampati: III  TM distance: >3 FB  Neck ROM: full     Cardiovascular   Rhythm: regular  Rate: normal     Dental    Pulmonary   Breath sounds clear to auscultation     Abdominal      Other findings: L upper crown; L lower impalnt          Anesthesia Plan    History of general anesthesia?: yes  History of complications of general anesthesia?: no    ASA 3     general     The patient is a current smoker.  Patient did not smoke on day of procedure.    intravenous induction   Postoperative administration of opioids is intended.  Trial extubation is planned.  Anesthetic plan and risks discussed with patient.  Use of blood products discussed with patient who consented to blood products.    Plan discussed with CRNA.

## 2024-05-21 ENCOUNTER — TELEPHONE (OUTPATIENT)
Dept: OTOLARYNGOLOGY | Facility: HOSPITAL | Age: 54
End: 2024-05-21
Payer: MEDICARE

## 2024-05-21 ASSESSMENT — PAIN SCALES - GENERAL: PAINLEVEL_OUTOF10: 4

## 2024-05-21 NOTE — TELEPHONE ENCOUNTER
"Called pt to see how she is doing after her surgery yesterday with Dr. Kenney. Pt did not answer, voicemail left for pt stating I will send her a [employer] message or she can give me a call back.     Addendum 5/22/24 : Called pt again to see how she is doing after her surgery with Dr. Kenney. Pt states she is fine, other than her \"voice is low\". Pt states she has been following the post op instructions. Pt stated she wanted to see Dr. Kenney next week. I reminded pt that Dr. Kenney's recommendation is to see Zora Rodriguez (SLP) next week (5/28 scheduled) and to see Dr. Kenney in 2 months (7/16 scheduled). I also informed pt that after her visit next week with Zora, that Zora will check in with Dr. Kenney next week and determine if her follow up visit with Dr. Kenney needs to be sooner. Also informed pt to call me back if anything changes or worsens. Pt verbalized understanding. Dr. Kenney aware.   "

## 2024-05-29 ENCOUNTER — HOSPITAL ENCOUNTER (OUTPATIENT)
Dept: OPERATING ROOM | Facility: CLINIC | Age: 54
Setting detail: OUTPATIENT SURGERY
Discharge: HOME | End: 2024-05-29
Payer: MEDICARE

## 2024-05-29 ENCOUNTER — HOSPITAL ENCOUNTER (OUTPATIENT)
Dept: RADIOLOGY | Facility: CLINIC | Age: 54
Setting detail: OUTPATIENT SURGERY
Discharge: HOME | End: 2024-05-29
Payer: MEDICARE

## 2024-05-29 VITALS
TEMPERATURE: 97.5 F | DIASTOLIC BLOOD PRESSURE: 64 MMHG | RESPIRATION RATE: 16 BRPM | HEIGHT: 63 IN | OXYGEN SATURATION: 94 % | SYSTOLIC BLOOD PRESSURE: 129 MMHG | BODY MASS INDEX: 25.38 KG/M2 | HEART RATE: 72 BPM

## 2024-05-29 DIAGNOSIS — J44.9 COPD, MILD (MULTI): ICD-10-CM

## 2024-05-29 DIAGNOSIS — M46.1 BILATERAL SACROILIITIS (CMS-HCC): ICD-10-CM

## 2024-05-29 PROCEDURE — 27096 INJECT SACROILIAC JOINT: CPT | Performed by: PHYSICAL MEDICINE & REHABILITATION

## 2024-05-29 PROCEDURE — 27096 INJECT SACROILIAC JOINT: CPT | Mod: 50 | Performed by: PHYSICAL MEDICINE & REHABILITATION

## 2024-05-29 PROCEDURE — 7100000010 HC PHASE TWO TIME - EACH INCREMENTAL 1 MINUTE

## 2024-05-29 PROCEDURE — 2550000001 HC RX 255 CONTRASTS: Performed by: PHYSICAL MEDICINE & REHABILITATION

## 2024-05-29 PROCEDURE — 3600000006 HC OR TIME - EACH INCREMENTAL 1 MINUTE - PROCEDURE LEVEL ONE

## 2024-05-29 PROCEDURE — 2500000005 HC RX 250 GENERAL PHARMACY W/O HCPCS: Performed by: PHYSICAL MEDICINE & REHABILITATION

## 2024-05-29 PROCEDURE — 7100000009 HC PHASE TWO TIME - INITIAL BASE CHARGE

## 2024-05-29 PROCEDURE — 99152 MOD SED SAME PHYS/QHP 5/>YRS: CPT | Performed by: PHYSICAL MEDICINE & REHABILITATION

## 2024-05-29 PROCEDURE — 2500000004 HC RX 250 GENERAL PHARMACY W/ HCPCS (ALT 636 FOR OP/ED): Performed by: PHYSICAL MEDICINE & REHABILITATION

## 2024-05-29 PROCEDURE — 3600000001 HC OR TIME - INITIAL BASE CHARGE - PROCEDURE LEVEL ONE

## 2024-05-29 RX ORDER — LIDOCAINE HYDROCHLORIDE 5 MG/ML
INJECTION, SOLUTION INFILTRATION; PERINEURAL AS NEEDED
Status: COMPLETED | OUTPATIENT
Start: 2024-05-29 | End: 2024-05-29

## 2024-05-29 RX ORDER — FENTANYL CITRATE 50 UG/ML
INJECTION, SOLUTION INTRAMUSCULAR; INTRAVENOUS AS NEEDED
Status: COMPLETED | OUTPATIENT
Start: 2024-05-29 | End: 2024-05-29

## 2024-05-29 RX ORDER — ALBUTEROL SULFATE 90 UG/1
2 AEROSOL, METERED RESPIRATORY (INHALATION) EVERY 4 HOURS PRN
Qty: 8.5 G | Refills: 2 | Status: SHIPPED | OUTPATIENT
Start: 2024-05-29

## 2024-05-29 RX ORDER — TRIAMCINOLONE ACETONIDE 40 MG/ML
INJECTION, SUSPENSION INTRA-ARTICULAR; INTRAMUSCULAR AS NEEDED
Status: COMPLETED | OUTPATIENT
Start: 2024-05-29 | End: 2024-05-29

## 2024-05-29 RX ADMIN — IOHEXOL 2 ML: 240 INJECTION, SOLUTION INTRATHECAL; INTRAVASCULAR; INTRAVENOUS; ORAL at 08:38

## 2024-05-29 RX ADMIN — FENTANYL CITRATE 50 MCG: 50 INJECTION, SOLUTION INTRAMUSCULAR; INTRAVENOUS at 08:36

## 2024-05-29 RX ADMIN — TRIAMCINOLONE ACETONIDE 40 MG: 40 INJECTION, SUSPENSION INTRA-ARTICULAR; INTRAMUSCULAR at 08:39

## 2024-05-29 RX ADMIN — LIDOCAINE HYDROCHLORIDE 5 ML: 5 INJECTION, SOLUTION INFILTRATION; PERINEURAL at 08:38

## 2024-05-29 ASSESSMENT — COLUMBIA-SUICIDE SEVERITY RATING SCALE - C-SSRS
2. HAVE YOU ACTUALLY HAD ANY THOUGHTS OF KILLING YOURSELF?: NO
6. HAVE YOU EVER DONE ANYTHING, STARTED TO DO ANYTHING, OR PREPARED TO DO ANYTHING TO END YOUR LIFE?: NO
1. IN THE PAST MONTH, HAVE YOU WISHED YOU WERE DEAD OR WISHED YOU COULD GO TO SLEEP AND NOT WAKE UP?: NO

## 2024-05-29 ASSESSMENT — PAIN - FUNCTIONAL ASSESSMENT
PAIN_FUNCTIONAL_ASSESSMENT: 0-10

## 2024-05-29 ASSESSMENT — PAIN SCALES - GENERAL
PAINLEVEL_OUTOF10: 0 - NO PAIN
PAINLEVEL_OUTOF10: 5 - MODERATE PAIN
PAINLEVEL_OUTOF10: 0 - NO PAIN

## 2024-05-29 NOTE — H&P
Bilateral SIJ IA  Review of Systems  Denied any fever or chills. No weight loss and no night sweats. No cough or sputum production. No diarrhea   The constipation has been responding to fibers and over the counter medications.     No bladder and bowel incontinence and no other changes in bladder and bowel. No skin changes.  Reports tiredness and fatigability only if the pain is not controlled.   Denied opioids diversion and abuse and denies alcoholism. Denies overuse of the pain medications.  Consented ASA 2

## 2024-05-29 NOTE — OP NOTE
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 Monitoring of the vitals signs performed by qualified Registered Nurse during the entire procedure  Please see sedation record for sedation by RN.       Intraprocedure I/O Totals       None           Specimen: No specimens collected     Staff:   Circulator: Shobha Marsh RN                   Findings: degenerative changes     Indications: Suyapa Thao is an 53 y.o. female who is having bilateral sacro iliac joint injection with fluoroscopy     The patient was seen in the preoperative area. The risks, benefits, complications, treatment options, non-operative alternatives, expected recovery and outcomes were discussed with the patient. The possibilities of reaction to medication, pulmonary aspiration, injury to surrounding structures, bleeding, recurrent infection, the need for additional procedures, failure to diagnose a condition, and creating a complication requiring transfusion or operation were discussed with the patient. The patient concurred with the proposed plan, giving informed consent.  The site of surgery was properly noted/marked if necessary per policy. The patient has been actively warmed in preoperative area. Preoperative antibiotics are not indicated. Venous thrombosis prophylaxis are not indicated.    Procedure Details: Time-in: 830 am   time-out: 844 am     Sedation: 50 mcg of IV fentanyly   Patient sedated but responsive to verbal commands. Monitoring of the vitals signs  performed by qualified Registered Nurse during the entire procedure  Please see sedation record for sedation by RN.      Side:  bilateral SIJ     Indication: Failure to respond to conservative treatment with therapy and medications.     PROCEDURE:    The risks, benefits and alternatives of the procedure were discussed with the patient and agreed to proceed. The risks included but not limited to: infection, bleeding, paralysis, nerve injury sepsis and remotely death were discussed with the patient during the office and again in the pre procedure area.  The patient signed informed consent in the pre procedure area.     The patient was brought to procedure room and time out for the procedure was performed with the procedure room staff present.    Patient placed in prone position on the procedure table and draped and covered appropriately.      Fluoroscopy machine was used to identify the lumbo sacral area over the  right SIJ    Skin was prepped and draped in sterile fashion over the SIJ area.  Skin was infiltrated with local anesthetic with lidocaien 0.5%    Spinal needle was to the lower end of the SIJ, tip of the needle position was verified with adequate flow of contrast dye 0.5cc of Isovue in the joint space.  At that point, 60 mg dexamethasone mixed with 1 mL of lidocaine 0.5% were injected.      Same procedure repeated on the left SIJ    with 60 mg of kenalog  mixed with 1 cc of lidocaine 0.5%     injected in the left SIJ    Procedure tolerated very well.  No complications encountered.  Post procedure care discussed with the patient, agreed to proceed.   Patient instructed on keeping track of the pain level after discharge.   Patient discharged home, from the recovery room, in stable condition.   Complications:  None; patient tolerated the procedure well.    Disposition: PACU - hemodynamically stable.  Condition: stable            Attending Attestation: I performed the procedure    Daniele Elder MD

## 2024-06-13 ENCOUNTER — LAB (OUTPATIENT)
Dept: LAB | Facility: LAB | Age: 54
End: 2024-06-13
Payer: MEDICARE

## 2024-06-13 DIAGNOSIS — E16.2 HYPOGLYCEMIA: ICD-10-CM

## 2024-06-13 DIAGNOSIS — E78.5 HYPERLIPIDEMIA, UNSPECIFIED HYPERLIPIDEMIA TYPE: ICD-10-CM

## 2024-06-13 DIAGNOSIS — M85.80 OSTEOPENIA, UNSPECIFIED LOCATION: ICD-10-CM

## 2024-06-13 DIAGNOSIS — F32.A DEPRESSION, UNSPECIFIED DEPRESSION TYPE: ICD-10-CM

## 2024-06-13 DIAGNOSIS — F43.10 PTSD (POST-TRAUMATIC STRESS DISORDER): ICD-10-CM

## 2024-06-13 DIAGNOSIS — R73.03 PREDIABETES: ICD-10-CM

## 2024-06-13 DIAGNOSIS — R71.8 MICROCYTOSIS: ICD-10-CM

## 2024-06-13 LAB
25(OH)D3 SERPL-MCNC: 34 NG/ML (ref 30–100)
ALBUMIN SERPL BCP-MCNC: 4.2 G/DL (ref 3.4–5)
ALP SERPL-CCNC: 48 U/L (ref 33–110)
ALT SERPL W P-5'-P-CCNC: 12 U/L (ref 7–45)
ANION GAP SERPL CALC-SCNC: 9 MMOL/L (ref 10–20)
AST SERPL W P-5'-P-CCNC: 13 U/L (ref 9–39)
BASOPHILS # BLD AUTO: 0.04 X10*3/UL (ref 0–0.1)
BASOPHILS NFR BLD AUTO: 0.4 %
BILIRUB SERPL-MCNC: 0.6 MG/DL (ref 0–1.2)
BUN SERPL-MCNC: 11 MG/DL (ref 6–23)
CALCIUM SERPL-MCNC: 9.4 MG/DL (ref 8.6–10.3)
CHLORIDE SERPL-SCNC: 105 MMOL/L (ref 98–107)
CHOLEST SERPL-MCNC: 134 MG/DL (ref 0–199)
CHOLESTEROL/HDL RATIO: 2
CO2 SERPL-SCNC: 29 MMOL/L (ref 21–32)
CREAT SERPL-MCNC: 0.65 MG/DL (ref 0.5–1.05)
EGFRCR SERPLBLD CKD-EPI 2021: >90 ML/MIN/1.73M*2
EOSINOPHIL # BLD AUTO: 0.14 X10*3/UL (ref 0–0.7)
EOSINOPHIL NFR BLD AUTO: 1.5 %
ERYTHROCYTE [DISTWIDTH] IN BLOOD BY AUTOMATED COUNT: 15.1 % (ref 11.5–14.5)
GLUCOSE SERPL-MCNC: 81 MG/DL (ref 74–99)
HCT VFR BLD AUTO: 45.1 % (ref 36–46)
HDLC SERPL-MCNC: 68.1 MG/DL
HGB BLD-MCNC: 14.1 G/DL (ref 12–16)
IMM GRANULOCYTES # BLD AUTO: 0.04 X10*3/UL (ref 0–0.7)
IMM GRANULOCYTES NFR BLD AUTO: 0.4 % (ref 0–0.9)
INSULIN P FAST SERPL-ACNC: 8 UIU/ML (ref 3–25)
IRON SATN MFR SERPL: 35 % (ref 25–45)
IRON SERPL-MCNC: 131 UG/DL (ref 35–150)
LDLC SERPL CALC-MCNC: 54 MG/DL
LYMPHOCYTES # BLD AUTO: 2.77 X10*3/UL (ref 1.2–4.8)
LYMPHOCYTES NFR BLD AUTO: 30.2 %
MCH RBC QN AUTO: 25.9 PG (ref 26–34)
MCHC RBC AUTO-ENTMCNC: 31.3 G/DL (ref 32–36)
MCV RBC AUTO: 83 FL (ref 80–100)
MONOCYTES # BLD AUTO: 0.44 X10*3/UL (ref 0.1–1)
MONOCYTES NFR BLD AUTO: 4.8 %
NEUTROPHILS # BLD AUTO: 5.73 X10*3/UL (ref 1.2–7.7)
NEUTROPHILS NFR BLD AUTO: 62.7 %
NON HDL CHOLESTEROL: 66 MG/DL (ref 0–149)
NRBC BLD-RTO: 0 /100 WBCS (ref 0–0)
PLATELET # BLD AUTO: 320 X10*3/UL (ref 150–450)
POTASSIUM SERPL-SCNC: 4.3 MMOL/L (ref 3.5–5.3)
PROT SERPL-MCNC: 6.6 G/DL (ref 6.4–8.2)
RBC # BLD AUTO: 5.45 X10*6/UL (ref 4–5.2)
SODIUM SERPL-SCNC: 139 MMOL/L (ref 136–145)
TIBC SERPL-MCNC: 377 UG/DL (ref 240–445)
TRIGL SERPL-MCNC: 60 MG/DL (ref 0–149)
TSH SERPL-ACNC: 2.23 MIU/L (ref 0.44–3.98)
UIBC SERPL-MCNC: 246 UG/DL (ref 110–370)
VLDL: 12 MG/DL (ref 0–40)
WBC # BLD AUTO: 9.2 X10*3/UL (ref 4.4–11.3)

## 2024-06-13 PROCEDURE — 82306 VITAMIN D 25 HYDROXY: CPT

## 2024-06-13 PROCEDURE — 85025 COMPLETE CBC W/AUTO DIFF WBC: CPT

## 2024-06-13 PROCEDURE — 36415 COLL VENOUS BLD VENIPUNCTURE: CPT

## 2024-06-13 PROCEDURE — 80053 COMPREHEN METABOLIC PANEL: CPT

## 2024-06-13 PROCEDURE — 80061 LIPID PANEL: CPT

## 2024-06-13 PROCEDURE — 83525 ASSAY OF INSULIN: CPT

## 2024-06-13 PROCEDURE — 83540 ASSAY OF IRON: CPT

## 2024-06-13 PROCEDURE — 83550 IRON BINDING TEST: CPT

## 2024-06-13 PROCEDURE — 84443 ASSAY THYROID STIM HORMONE: CPT

## 2024-06-13 PROCEDURE — 83036 HEMOGLOBIN GLYCOSYLATED A1C: CPT

## 2024-06-14 ENCOUNTER — LAB (OUTPATIENT)
Dept: LAB | Facility: LAB | Age: 54
End: 2024-06-14
Payer: MEDICARE

## 2024-06-14 DIAGNOSIS — R73.03 PREDIABETES: ICD-10-CM

## 2024-06-14 DIAGNOSIS — E16.2 HYPOGLYCEMIA: ICD-10-CM

## 2024-06-14 DIAGNOSIS — Z30.41 ENCOUNTER FOR SURVEILLANCE OF CONTRACEPTIVE PILLS: ICD-10-CM

## 2024-06-14 DIAGNOSIS — N92.1 MENORRHAGIA WITH IRREGULAR CYCLE: Primary | ICD-10-CM

## 2024-06-14 LAB
EST. AVERAGE GLUCOSE BLD GHB EST-MCNC: 117 MG/DL
GLUCOSE 1H P 75 G GLC PO SERPL-MCNC: 134 MG/DL
GLUCOSE 2H P 75 G GLC PO SERPL-MCNC: 55 MG/DL
GLUCOSE 3H P 75 G GLC PO SERPL-MCNC: 62 MG/DL
GLUCOSE P FAST SERPL-MCNC: 81 MG/DL
HBA1C MFR BLD: 5.7 %

## 2024-06-14 PROCEDURE — 36415 COLL VENOUS BLD VENIPUNCTURE: CPT

## 2024-06-14 PROCEDURE — 82952 GTT-ADDED SAMPLES: CPT

## 2024-06-14 PROCEDURE — 82951 GLUCOSE TOLERANCE TEST (GTT): CPT

## 2024-06-19 RX ORDER — NORETHINDRONE 0.35 MG/1
1 TABLET ORAL DAILY
Qty: 84 TABLET | Refills: 3 | Status: SHIPPED | OUTPATIENT
Start: 2024-06-19

## 2024-06-25 DIAGNOSIS — N89.8 VAGINAL DRYNESS: ICD-10-CM

## 2024-06-25 RX ORDER — ESTRADIOL 10 UG/1
INSERT VAGINAL
Qty: 8 TABLET | Refills: 2 | Status: SHIPPED | OUTPATIENT
Start: 2024-06-25

## 2024-06-25 NOTE — TELEPHONE ENCOUNTER
Automatic electronic refill request from pharmacy.  Patient last seen: 4/10/24 by RENATO  Upcoming appointment scheduled: 9/3/24  Refills pended for Dr. Ontiveros to review.

## 2024-07-02 ENCOUNTER — APPOINTMENT (OUTPATIENT)
Dept: OTOLARYNGOLOGY | Facility: CLINIC | Age: 54
End: 2024-07-02
Payer: MEDICARE

## 2024-07-03 DIAGNOSIS — F43.10 PTSD (POST-TRAUMATIC STRESS DISORDER): ICD-10-CM

## 2024-07-05 RX ORDER — CLONAZEPAM 1 MG/1
1 TABLET ORAL 2 TIMES DAILY
Qty: 180 TABLET | Refills: 0 | Status: SHIPPED | OUTPATIENT
Start: 2024-07-05 | End: 2024-10-03

## 2024-07-10 ENCOUNTER — APPOINTMENT (OUTPATIENT)
Dept: PRIMARY CARE | Facility: CLINIC | Age: 54
End: 2024-07-10
Payer: MEDICARE

## 2024-07-10 ENCOUNTER — TELEPHONE (OUTPATIENT)
Dept: PRIMARY CARE | Facility: CLINIC | Age: 54
End: 2024-07-10

## 2024-07-10 VITALS
HEART RATE: 95 BPM | TEMPERATURE: 96.8 F | OXYGEN SATURATION: 98 % | SYSTOLIC BLOOD PRESSURE: 121 MMHG | HEIGHT: 63 IN | BODY MASS INDEX: 24.45 KG/M2 | DIASTOLIC BLOOD PRESSURE: 88 MMHG | WEIGHT: 138 LBS | RESPIRATION RATE: 13 BRPM

## 2024-07-10 DIAGNOSIS — J44.9 COPD, MILD (MULTI): ICD-10-CM

## 2024-07-10 DIAGNOSIS — M85.80 OSTEOPENIA, UNSPECIFIED LOCATION: ICD-10-CM

## 2024-07-10 DIAGNOSIS — J38.01 COMPLETE PARALYSIS OF LEFT VOCAL CORD: Primary | ICD-10-CM

## 2024-07-10 DIAGNOSIS — E16.2 HYPOGLYCEMIA: ICD-10-CM

## 2024-07-10 DIAGNOSIS — Z86.59 HISTORY OF CLAUSTROPHOBIA: Primary | ICD-10-CM

## 2024-07-10 DIAGNOSIS — N92.1 MENORRHAGIA WITH IRREGULAR CYCLE: ICD-10-CM

## 2024-07-10 DIAGNOSIS — F17.200 SMOKING ADDICTION: ICD-10-CM

## 2024-07-10 DIAGNOSIS — R73.03 PREDIABETES: ICD-10-CM

## 2024-07-10 DIAGNOSIS — F43.10 PTSD (POST-TRAUMATIC STRESS DISORDER): ICD-10-CM

## 2024-07-10 DIAGNOSIS — N80.03 ADENOMYOSIS OF THE UTERUS: ICD-10-CM

## 2024-07-10 PROCEDURE — 99215 OFFICE O/P EST HI 40 MIN: CPT | Performed by: INTERNAL MEDICINE

## 2024-07-10 PROCEDURE — 4004F PT TOBACCO SCREEN RCVD TLK: CPT | Performed by: INTERNAL MEDICINE

## 2024-07-10 RX ORDER — ALBUTEROL SULFATE 90 UG/1
2 AEROSOL, METERED RESPIRATORY (INHALATION) EVERY 4 HOURS PRN
Qty: 8.5 G | Refills: 2 | Status: SHIPPED | OUTPATIENT
Start: 2024-07-10

## 2024-07-10 SDOH — ECONOMIC STABILITY: FOOD INSECURITY: WITHIN THE PAST 12 MONTHS, THE FOOD YOU BOUGHT JUST DIDN'T LAST AND YOU DIDN'T HAVE MONEY TO GET MORE.: NEVER TRUE

## 2024-07-10 SDOH — ECONOMIC STABILITY: FOOD INSECURITY: WITHIN THE PAST 12 MONTHS, YOU WORRIED THAT YOUR FOOD WOULD RUN OUT BEFORE YOU GOT MONEY TO BUY MORE.: NEVER TRUE

## 2024-07-10 ASSESSMENT — LIFESTYLE VARIABLES
SKIP TO QUESTIONS 9-10: 1
HOW MANY STANDARD DRINKS CONTAINING ALCOHOL DO YOU HAVE ON A TYPICAL DAY: PATIENT DOES NOT DRINK
AUDIT-C TOTAL SCORE: 0
HOW OFTEN DO YOU HAVE SIX OR MORE DRINKS ON ONE OCCASION: NEVER
HOW OFTEN DO YOU HAVE A DRINK CONTAINING ALCOHOL: NEVER

## 2024-07-10 ASSESSMENT — PATIENT HEALTH QUESTIONNAIRE - PHQ9
3. TROUBLE FALLING OR STAYING ASLEEP: NEARLY EVERY DAY
4. FEELING TIRED OR HAVING LITTLE ENERGY: NEARLY EVERY DAY
1. LITTLE INTEREST OR PLEASURE IN DOING THINGS: NEARLY EVERY DAY
SUM OF ALL RESPONSES TO PHQ9 QUESTIONS 1 & 2: 6
10. IF YOU CHECKED OFF ANY PROBLEMS, HOW DIFFICULT HAVE THESE PROBLEMS MADE IT FOR YOU TO DO YOUR WORK, TAKE CARE OF THINGS AT HOME, OR GET ALONG WITH OTHER PEOPLE: SOMEWHAT DIFFICULT
8. MOVING OR SPEAKING SO SLOWLY THAT OTHER PEOPLE COULD HAVE NOTICED. OR THE OPPOSITE, BEING SO FIGETY OR RESTLESS THAT YOU HAVE BEEN MOVING AROUND A LOT MORE THAN USUAL: NEARLY EVERY DAY
5. POOR APPETITE OR OVEREATING: NEARLY EVERY DAY
6. FEELING BAD ABOUT YOURSELF - OR THAT YOU ARE A FAILURE OR HAVE LET YOURSELF OR YOUR FAMILY DOWN: NEARLY EVERY DAY
9. THOUGHTS THAT YOU WOULD BE BETTER OFF DEAD, OR OF HURTING YOURSELF: NEARLY EVERY DAY
7. TROUBLE CONCENTRATING ON THINGS, SUCH AS READING THE NEWSPAPER OR WATCHING TELEVISION: NEARLY EVERY DAY
2. FEELING DOWN, DEPRESSED OR HOPELESS: NEARLY EVERY DAY
SUM OF ALL RESPONSES TO PHQ QUESTIONS 1-9: 27

## 2024-07-10 ASSESSMENT — ANXIETY QUESTIONNAIRES
7. FEELING AFRAID AS IF SOMETHING AWFUL MIGHT HAPPEN: NEARLY EVERY DAY
6. BECOMING EASILY ANNOYED OR IRRITABLE: NEARLY EVERY DAY
4. TROUBLE RELAXING: NEARLY EVERY DAY
1. FEELING NERVOUS, ANXIOUS, OR ON EDGE: NEARLY EVERY DAY
5. BEING SO RESTLESS THAT IT IS HARD TO SIT STILL: NEARLY EVERY DAY
IF YOU CHECKED OFF ANY PROBLEMS ON THIS QUESTIONNAIRE, HOW DIFFICULT HAVE THESE PROBLEMS MADE IT FOR YOU TO DO YOUR WORK, TAKE CARE OF THINGS AT HOME, OR GET ALONG WITH OTHER PEOPLE: SOMEWHAT DIFFICULT
3. WORRYING TOO MUCH ABOUT DIFFERENT THINGS: NEARLY EVERY DAY
2. NOT BEING ABLE TO STOP OR CONTROL WORRYING: NEARLY EVERY DAY
GAD7 TOTAL SCORE: 21

## 2024-07-10 ASSESSMENT — PAIN SCALES - GENERAL: PAINLEVEL: 6

## 2024-07-10 NOTE — TELEPHONE ENCOUNTER
Pt stated at check-out that she is severely claustrophobic and is usually given 1 pill to take the day of when getting MRI scans. Pt isn't sure what this is, but is unable to do an MRI without it. The MRI is on 7/23. Please advise.

## 2024-07-10 NOTE — ASSESSMENT & PLAN NOTE
Noted on DEXA scan, advise starting calcium 1200 mg daily with vitamin D 2000 international units daily, monitor labs.     
Patient gets hypoglycemia, her 3 hr GTT noted glucoses in the 50-65 range. Fasting insulin level was only 8. Advise the patient to eat small meals frequently. This is probably the best option for hypoglycemia at this time  
Patient is having ongoing heavy menses, she is concerned. A secure chat was initiated with Dr Graham. Patient would like to follow up with her, she would like to get a repeat MRI completed also   
Quit smoking, this may help reduce risk of osteoporosis also . Encourage smoking cessation  
No indicators present

## 2024-07-10 NOTE — PROGRESS NOTES
Chief Complaint/HPI:    Follow up:    Hyperglycemia: patient had 3 hour GTT, glucose dropped to 55 one hour after glucose, 62 2 hours after glucose administration     vocal cord paralysis: patient had polyps removed, she had vocal cord injection per [facility]. She still has a hoarse voice, she has had injection x 3, she has seen Dr Saunders who offered fat injection to improve closing of vocal cords. she also saw [facility] where surgery was also discussed with the patient.  Patient had procedure, she quit smoking for 33 days, then she resumed      Patient has chronic neck pain, she has gone to [facility] for evaluation of her neck, patient notes that pain radiates to the neck. The patient had an injection that was helpful per pain management      PTSD: patient worked at [employer] as . She was active in [place], [place]. She does see psychiatry, she is disabled due to PTSD symptoms. She [place] to [place] after service. Patient admits that what is going on in [place] is currently bothering her.       Numbness and pain in the feet and hands: patient has severe pain in the feet, numbness in the hands is noted also . She states that she rarely goes to physician except for current disability issues, She does go to pain management also. Patient gets pain in the knees and the elbows.      Smoking: patient is trying to quit smoking, she now smoking about 7 cigarettes per day, she has smoked since age 18     Adenomyosis of uterus: patient has been offered hysterectomy and other treatments per [facility] , patient has been having abnormal bleeding, passing blood clots for 4 days. Patient has been having intermittent bleeding for the past several weeks. She is having cramping also. She has seen Dr Graham in the past.      Premenopausal: patient has been noted to be premenopausal, she has had DEXA completed, she is having irregular menstrual bleeding now    ROS otherwise negative aside from what was mentioned above in HPI.      Patient Active  Problem List   Diagnosis    Achilles tendinitis of left lower extremity    Cervical spondylosis without myelopathy    Chronic cough    Complete paralysis of left vocal cord    COPD, mild (Multi)    Degeneration of intervertebral disc of lumbosacral region    Depression, recurrent (CMS-HCC)    Fibroids    Functional dyspareunia    Herniated nucleus pulposus, C5-6    Bilateral sacroiliitis (CMS-HCC)    Lesion of vocal cord    Trauma to vocal cord    Lumbar spondylosis    Myofascial pain syndrome of lumbar spine    Right lumbar radiculitis    Menses, irregular    Mild anemia    Numbness of fingers of both hands    Other cervical disc displacement at C5-C6 level    Overweight (BMI 25.0-29.9)    Pelvic mass in female    Perimenopause    Prediabetes    PTSD (post-traumatic stress disorder)    Recent change in weight    Right cervical radiculopathy    Shortness of breath    Smoking addiction    TEETEE (stress urinary incontinence, female)    Vaginal dryness    Voice hoarseness    Vulvar mass    Vaginal atrophy    Adenomyosis of the uterus    Lymphangioma, any site    Encounter for surveillance of contraceptive pills    Dysphonia    Neck pain    Tobacco dependency    LPRD (laryngopharyngeal reflux disease)    Sensorineural hearing loss (SNHL) of both ears    Glottic insufficiency    Hoarseness of voice    Menorrhagia with irregular cycle    Burn of wrist    Labial cyst    Menopausal osteoporosis    Ankle pain    Spinal stenosis of cervical region    Injury of right foot    Subjective hearing loss    Urinary incontinence    Vaginal discharge    Hypoglycemia    Hyperlipidemia    Osteopenia         Past Medical History:   Diagnosis Date    ADHD (attention deficit hyperactivity disorder)     Adjustment disorder     Anxiety     Chronic pain disorder     Depression     Hallucination     Memory loss     Panic attack     Post-traumatic stress disorder, unspecified 08/02/2022    PTSD (post-traumatic stress disorder)    Psychiatric  illness     PTSD (post-traumatic stress disorder)     Sleep difficulties      Past Surgical History:   Procedure Laterality Date    OTHER SURGICAL HISTORY  03/29/2022    Vocal Cordectomy    OTHER SURGICAL HISTORY  03/29/2022    Breast surgery/ augmentation     Social History     Social History Narrative    Not on file         ALLERGIES  Patient has no known allergies.      MEDICATIONS  Current Outpatient Medications on File Prior to Visit   Medication Sig Dispense Refill    clonazePAM (KlonoPIN) 1 mg tablet Take 1 tablet (1 mg) by mouth 2 times a day. 180 tablet 0    escitalopram (Lexapro) 20 mg tablet Take 1 tablet (20 mg) by mouth once daily. 90 tablet 3    estradiol (Vagifem) 10 mcg tablet vaginal tablet INSERT 1 TABLET VAGINALLY AT BEDTIME TWICE A WEEK 8 tablet 2    Incassia 0.35 mg tablet TAKE 1 TABLET DAILY 84 tablet 3    mirabegron (Myrbetriq) 50 mg tablet extended release 24 hr 24 hr tablet Take 1 tablet (50 mg) by mouth once daily. LOT [id number]  EXP 07/2026 84 tablet 0    norethindrone (Lyleq) 0.35 mg tablet Take 1 tablet (0.35 mg) by mouth once daily. 28 tablet 12    ospemifene (Osphena) 60 mg tablet Take 1 tablet by mouth once daily.      prasterone, dhea, (Intrarosa) 6.5 mg insert Insert 6.5 mg into the vagina once daily. 30 each 3    prazosin (Minipress) 5 mg capsule Take 1 capsule (5 mg) by mouth once daily at bedtime. For nightmares 90 capsule 3    QUEtiapine (SEROquel) 200 mg tablet Take 1 tablet (200 mg) by mouth once daily at bedtime. 90 tablet 3    risperiDONE (RisperDAL) 1 mg tablet TAKE 1 TABLET BY MOUTH EVERY MORNING AND TAKE 1 TABLET BY MOUTH EVERY EVENING. 180 tablet 3    rosuvastatin (Crestor) 5 mg tablet Take 1 tablet (5 mg) by mouth once daily. 90 tablet 1    tiZANidine (Zanaflex) 2 mg tablet Take 1 tablet (2 mg) by mouth 3 times a day.      [DISCONTINUED] albuterol 90 mcg/actuation inhaler INHALE 2 PUFFS BY MOUTH EVERY 4 HOURS AS NEEDED FOR WHEEZING OR SHORTNESS OF BREATH 8.5 g 2     "[DISCONTINUED] naloxone (Narcan) 4 mg/0.1 mL nasal spray Administer 1 spray (4 mg) into affected nostril(s) if needed for opioid reversal for up to 2 doses. May repeat every 2-3 minutes if needed, alternating nostrils, until medical assistance becomes available. (Patient not taking: Reported on 7/10/2024) 2 each 0     No current facility-administered medications on file prior to visit.         PHYSICAL EXAM  /88 (BP Location: Left arm, Patient Position: Sitting)   Pulse 95   Temp 36 °C (96.8 °F) (Temporal)   Resp 13   Ht 1.6 m (5' 3\")   Wt 62.6 kg (138 lb)   SpO2 98%   BMI 24.45 kg/m²   Body mass index is 24.45 kg/m².  Constitutional   General appearance: Alert and in no acute distress. well developed, well nourished, within normal limits of ideal weight, she is accompanied by mother  Eyes   Inspection of eyes: Sclera and conjunctiva were normal. wearing glasses.   Ears, Nose, Mouth, and Throat   Ears: Auricles: Normal. No thyromegaly or neck masses are noted  Pulmonary   Respiratory assessment: No respiratory distress, normal respiratory rhythm and effort.    Auscultation of lungs: Auscultation of the lungs revealed decreased breath sounds diffusely. no rales or crackles were heard bilaterally. no rhonchi. No wheezing noted  Cardiovascular   Auscultation of heart: Apical pulse normal, heart rate and rhythm normal, normal S1 and S2, no murmurs and no pericardial rub.  No carotid bruits, no peripheral  edema  Lymphatic   Palpation of lymph nodes in neck: No cervical lymphadenopathy.   Musculoskeletal right lower lumbar tenderness noted, hand grasp is 4/5 bilaterally, patient c/o numbness of the fingers.   Neurologic   Cranial nerves: Nerves 2-12 were intact, no focal neuro defects.    Abdomen  BS + x 4, no organomegaly or masses, tender in the suprapubic region, no flank tenderness noted      Psychiatric   Orientation: Oriented to person, place, and time.    Mood and affect: patient is slightly anxious " today.     ASSESSMENT/PLAN  Problem List Items Addressed This Visit       Adenomyosis of the uterus    Overview     Pelvic ultrasound in August 2023 had suggested uterine fibroid. MRI 9/15/2023 revealed uterine adenomyosis with normal appearing endometrium, no evidence of fibroid and no adnexal abnormality.  Menses are heavy and with cramps. She has had some improvement with progestin only OCP.  Endometrial biopsy returned benign with fragment of polyp. FSH and estradiol are trending  towards menopause.   We discussed management options of observation, continuing progestin only pills, progestin releasing IUD and hysterectomy.          Current Assessment & Plan     Patient is having ongoing heavy menses, she is concerned. A secure chat was initiated with Dr Graham. Patient would like to follow up with her, she would like to get a repeat MRI completed also          Relevant Orders    MR pelvis w and wo IV contrast    Complete paralysis of left vocal cord - Primary    COPD, mild (Multi)    Hypoglycemia    Current Assessment & Plan     Patient gets hypoglycemia, her 3 hr GTT noted glucoses in the 50-65 range. Fasting insulin level was only 8. Advise the patient to eat small meals frequently. This is probably the best option for hypoglycemia at this time         Relevant Orders    Referral to Endocrinology    Menorrhagia with irregular cycle    Overview     Heavy menses. Imaging shows evidence of adenomyosis.  Endometrial biopsy 3/28/2024 returned with fragment of endometrial polyp in background of menstrual endometrium. A polyp was not visualized on prior MRI or ultrasound so this is likely very small.  We discussed management options of observation, continuing progestin only pills, progestin releasing IUD and hysterectomy. She elected to continue progestin only pills.   TSH returned in normal range. FSH and estradiol are trending toward menopause.          Relevant Orders    MR pelvis w and wo IV contrast     Osteopenia    Current Assessment & Plan     Noted on DEXA scan, advise starting calcium 1200 mg daily with vitamin D 2000 international units daily, monitor labs.            Prediabetes    Relevant Orders    Referral to Endocrinology    PTSD (post-traumatic stress disorder)    Smoking addiction    Current Assessment & Plan     Quit smoking, this may help reduce risk of osteoporosis also . Encourage smoking cessation          Follow up after all testing    Brennan Kinney MD

## 2024-07-12 DIAGNOSIS — E78.5 HYPERLIPIDEMIA, UNSPECIFIED HYPERLIPIDEMIA TYPE: ICD-10-CM

## 2024-07-12 RX ORDER — ROSUVASTATIN CALCIUM 5 MG/1
5 TABLET, COATED ORAL DAILY
Qty: 90 TABLET | Refills: 1 | Status: SHIPPED | OUTPATIENT
Start: 2024-07-12

## 2024-07-12 RX ORDER — LORAZEPAM 0.5 MG/1
.5-1 TABLET ORAL SEE ADMIN INSTRUCTIONS
Qty: 2 TABLET | Refills: 0 | Status: SHIPPED | OUTPATIENT
Start: 2024-07-12 | End: 2024-08-11

## 2024-07-12 NOTE — TELEPHONE ENCOUNTER
Spoke to patient  She said she only has been taking Clonazepam at bedtime for nightmares  She said last MRI ordered by Dr Elder he gave her Valium prior to procedure.

## 2024-07-16 ENCOUNTER — TELEPHONE (OUTPATIENT)
Dept: PRIMARY CARE | Facility: CLINIC | Age: 54
End: 2024-07-16

## 2024-07-16 ENCOUNTER — APPOINTMENT (OUTPATIENT)
Dept: OTOLARYNGOLOGY | Facility: CLINIC | Age: 54
End: 2024-07-16
Payer: MEDICARE

## 2024-07-16 ENCOUNTER — APPOINTMENT (OUTPATIENT)
Dept: BEHAVIORAL HEALTH | Facility: CLINIC | Age: 54
End: 2024-07-16
Payer: MEDICARE

## 2024-07-16 NOTE — TELEPHONE ENCOUNTER
Patient called in and is wondering if she is prediabetic or not. Patient states that a lot was gone over during her appt and she does not remember if this was talked about or not.     Please advise

## 2024-07-19 ENCOUNTER — APPOINTMENT (OUTPATIENT)
Dept: BEHAVIORAL HEALTH | Facility: CLINIC | Age: 54
End: 2024-07-19
Payer: MEDICARE

## 2024-07-19 ENCOUNTER — TELEPHONE (OUTPATIENT)
Dept: OTHER | Age: 54
End: 2024-07-19

## 2024-07-19 NOTE — TELEPHONE ENCOUNTER
Client wanted to apolgize for missing her appointment today, is really upset because she hasn't missed an appoinment in 3 years, and says that she apologizes because she knows you're very busy.

## 2024-07-23 ENCOUNTER — APPOINTMENT (OUTPATIENT)
Dept: RADIOLOGY | Facility: CLINIC | Age: 54
End: 2024-07-23
Payer: MEDICARE

## 2024-07-23 ENCOUNTER — HOSPITAL ENCOUNTER (OUTPATIENT)
Dept: RADIOLOGY | Facility: CLINIC | Age: 54
Discharge: HOME | End: 2024-07-23
Payer: MEDICARE

## 2024-07-23 DIAGNOSIS — N92.1 MENORRHAGIA WITH IRREGULAR CYCLE: ICD-10-CM

## 2024-07-23 DIAGNOSIS — N80.03 ADENOMYOSIS OF THE UTERUS: ICD-10-CM

## 2024-07-23 PROCEDURE — 72197 MRI PELVIS W/O & W/DYE: CPT | Performed by: STUDENT IN AN ORGANIZED HEALTH CARE EDUCATION/TRAINING PROGRAM

## 2024-07-23 PROCEDURE — A9575 INJ GADOTERATE MEGLUMI 0.1ML: HCPCS | Performed by: INTERNAL MEDICINE

## 2024-07-23 PROCEDURE — 72197 MRI PELVIS W/O & W/DYE: CPT

## 2024-07-23 PROCEDURE — 2550000001 HC RX 255 CONTRASTS: Performed by: INTERNAL MEDICINE

## 2024-07-23 RX ORDER — GADOTERATE MEGLUMINE 376.9 MG/ML
0.2 INJECTION INTRAVENOUS
Status: COMPLETED | OUTPATIENT
Start: 2024-07-23 | End: 2024-07-23

## 2024-08-05 ENCOUNTER — APPOINTMENT (OUTPATIENT)
Dept: ORTHOPEDIC SURGERY | Facility: CLINIC | Age: 54
End: 2024-08-05
Payer: MEDICARE

## 2024-08-05 ENCOUNTER — HOSPITAL ENCOUNTER (OUTPATIENT)
Dept: RADIOLOGY | Facility: CLINIC | Age: 54
Discharge: HOME | End: 2024-08-05
Payer: MEDICARE

## 2024-08-05 VITALS — WEIGHT: 138 LBS | BODY MASS INDEX: 24.45 KG/M2 | HEIGHT: 63 IN

## 2024-08-05 DIAGNOSIS — S83.207A TEAR OF LEFT MENISCUS AS CURRENT INJURY, INITIAL ENCOUNTER: ICD-10-CM

## 2024-08-05 DIAGNOSIS — M25.562 LEFT KNEE PAIN, UNSPECIFIED CHRONICITY: ICD-10-CM

## 2024-08-05 PROCEDURE — 73564 X-RAY EXAM KNEE 4 OR MORE: CPT | Mod: LEFT SIDE | Performed by: RADIOLOGY

## 2024-08-05 PROCEDURE — 73564 X-RAY EXAM KNEE 4 OR MORE: CPT | Mod: LT

## 2024-08-05 PROCEDURE — 3008F BODY MASS INDEX DOCD: CPT

## 2024-08-05 PROCEDURE — 4004F PT TOBACCO SCREEN RCVD TLK: CPT

## 2024-08-05 PROCEDURE — 99204 OFFICE O/P NEW MOD 45 MIN: CPT

## 2024-08-05 ASSESSMENT — PAIN SCALES - GENERAL: PAINLEVEL_OUTOF10: 4

## 2024-08-05 ASSESSMENT — PAIN DESCRIPTION - DESCRIPTORS: DESCRIPTORS: ACHING

## 2024-08-05 ASSESSMENT — PAIN - FUNCTIONAL ASSESSMENT: PAIN_FUNCTIONAL_ASSESSMENT: 0-10

## 2024-08-05 NOTE — PROGRESS NOTES
PRIMARY CARE PHYSICIAN: Brennan Kinney MD  ORTHOPAEDIC FOLLOW-UP: Knee Evaluation    ASSESSMENT & PLAN    Impression: 53 y.o. female with a left knee injury concerning for a medial mensicus tear    Plan:   I reviewed with the patient the nature of their diagnosis.  I reviewed their imaging studies with them.    Based on the history, physical exam and imaging studies above, the patient's presentation is consistent with the above diagnosis.  I had a long discussion with the patient regarding their presentation and the treatment options.  We discussed initial nonoperative versus operative management options as well as potential further diagnostic imaging. Patient had an injury, symptoms, and physical exam findings concerning for a medial mensicus tear of the left knee. She has failed extensive conservative management including greater than 6 weeks of physical therapy, ice, bracing, activity modification and oral NSAIDs. She has also seen multiple providers for this yet still has pain and dysfunction of the left knee now affecting ADL. At this time I recommend proceeding with further diagnostic imaging with an MRI of the left knee. The MRI is medically necessary and indicated given her subjective complaints of instability and physical exam findings as described below . The MRI will be used for potential presurgical planning purposes. The MRI should be performed in a hospital setting so the surgeon has immediate access to the images. In the meantime, patient will continue to ice, brace and take oral NSAIDs as needed. She will follow-up with Dr. Dowling once the MRI is completed to review the images and discuss a treatment plan going forward.    Follow-Up: Patient will follow-up with Dr. Dowling once the MRI is completed to review the images and discuss a treatment plan going forward    At the end of the visit, all questions were answered in full. The patient is in agreement with the plan and recommendations. They  will call the office with any questions/concerns.    Note dictated with Pose software. Completed without full typed error editing and sent to avoid delay.     SUBJECTIVE  CHIEF COMPLAINT: Left knee pain       HPI: Suyapa Thao is a 53 y.o. patient. Suyapa Thao complains of left knee pain. Patient injured the left knee approximately 2 years ago when she was coming down a ladder from her attic, mis-stepped, planted and twisted the left knee, causing her knee buckle and subsequently fell. She presented to an Urgent Care shortly afterwards who referred her to Orthopedic Surgery. She followed-up with an Ortho specialist who referred her to PT for concern of possible medial meniscus tear. Patient worked with physical therapy at an outside location for greater than 6 weeks however continued to have pain. She reports pain with increased activity, specifically walking, going up/down stairs, and certain lateral/twisting motions. She describes the pain as sharp at times, localized to the medial joint line and anterior knee. Patient does complain of mechanical symptoms including, catching, locking and clicking of the left knee. She intermittent swelling. She has continued to work on her exercises from physical therapy at home with no relief. She has also tried consistent oral anti-inflammatory use with ibuprofen, ice, bracing and activity modification without relief.      REVIEW OF SYSTEMS  Constitutional: See HPI for pain assessment, No significant weight loss, recent trauma  Cardiovascular: No chest pain, shortness of breath  Respiratory: No difficulty breathing, cough  Gastrointestinal: No nausea, vomiting, diarrhea, constipation  Musculoskeletal: Noted in HPI, positive for pain, restricted motion, stiffness  Integumentary: No rashes, easy bruising, redness   Neurological: no numbness or tingling in extremities, no gait disturbances   Psychiatric: No mood changes, memory changes, social  issues  Heme/Lymph: no excessive swelling, easy bruising, excessive bleeding  ENT: No hearing changes  Eyes: No vision changes    Past Medical History:   Diagnosis Date    ADHD (attention deficit hyperactivity disorder)     Adjustment disorder     Anxiety     Chronic pain disorder     Depression     Hallucination     Memory loss     Panic attack     Post-traumatic stress disorder, unspecified 08/02/2022    PTSD (post-traumatic stress disorder)    Psychiatric illness     PTSD (post-traumatic stress disorder)     Sleep difficulties         No Known Allergies     Past Surgical History:   Procedure Laterality Date    OTHER SURGICAL HISTORY  03/29/2022    Vocal Cordectomy    OTHER SURGICAL HISTORY  03/29/2022    Breast surgery/ augmentation        Family History   Problem Relation Name Age of Onset    Hypertension Mother      Heart disease Father      Diabetes Father      Hypertension Father          Social History     Socioeconomic History    Marital status: Single     Spouse name: Not on file    Number of children: Not on file    Years of education: Not on file    Highest education level: Not on file   Occupational History    Occupation: not currently   Tobacco Use    Smoking status: Every Day     Current packs/day: 0.25     Types: Cigarettes    Smokeless tobacco: Never   Vaping Use    Vaping status: Former   Substance and Sexual Activity    Alcohol use: Not Currently     Comment: rarely    Drug use: Never    Sexual activity: Not Currently     Partners: Male     Birth control/protection: OCP   Other Topics Concern    Not on file   Social History Narrative    Not on file     Social Determinants of Health     Financial Resource Strain: Not on file   Food Insecurity: No Food Insecurity (7/10/2024)    Hunger Vital Sign     Worried About Running Out of Food in the Last Year: Never true     Ran Out of Food in the Last Year: Never true   Transportation Needs: Not on file   Physical Activity: Not on file   Stress: Not on file    Social Connections: Not on file   Intimate Partner Violence: Not on file   Housing Stability: Not on file        CURRENT MEDICATIONS:   Current Outpatient Medications   Medication Sig Dispense Refill    albuterol 90 mcg/actuation inhaler INHALE 2 PUFFS BY MOUTH EVERY 4 HOURS AS NEEDED FOR WHEEZING OR SHORTNESS OF BREATH 8.5 g 2    clonazePAM (KlonoPIN) 1 mg tablet Take 1 tablet (1 mg) by mouth 2 times a day. 180 tablet 0    escitalopram (Lexapro) 20 mg tablet Take 1 tablet (20 mg) by mouth once daily. 90 tablet 3    estradiol (Vagifem) 10 mcg tablet vaginal tablet INSERT 1 TABLET VAGINALLY AT BEDTIME TWICE A WEEK 8 tablet 2    Incassia 0.35 mg tablet TAKE 1 TABLET DAILY 84 tablet 3    LORazepam (Ativan) 0.5 mg tablet Take 1-2 tablets (0.5-1 mg) by mouth see administration instructions. Take 30 minutes prior to procedure. 2 tablet 0    mirabegron (Myrbetriq) 50 mg tablet extended release 24 hr 24 hr tablet Take 1 tablet (50 mg) by mouth once daily. LOT [id number]  EXP 07/2026 84 tablet 0    norethindrone (Lyleq) 0.35 mg tablet Take 1 tablet (0.35 mg) by mouth once daily. 28 tablet 12    ospemifene (Osphena) 60 mg tablet Take 1 tablet by mouth once daily.      prasterone, dhea, (Intrarosa) 6.5 mg insert Insert 6.5 mg into the vagina once daily. 30 each 3    prazosin (Minipress) 5 mg capsule Take 1 capsule (5 mg) by mouth once daily at bedtime. For nightmares 90 capsule 3    QUEtiapine (SEROquel) 200 mg tablet Take 1 tablet (200 mg) by mouth once daily at bedtime. 90 tablet 3    risperiDONE (RisperDAL) 1 mg tablet TAKE 1 TABLET BY MOUTH EVERY MORNING AND TAKE 1 TABLET BY MOUTH EVERY EVENING. 180 tablet 3    rosuvastatin (Crestor) 5 mg tablet TAKE 1 TABLET(5 MG) BY MOUTH DAILY 90 tablet 1    tiZANidine (Zanaflex) 2 mg tablet Take 1 tablet (2 mg) by mouth 3 times a day.       No current facility-administered medications for this visit.        OBJECTIVE    PHYSICAL EXAM      5/29/2024     7:57 AM 5/29/2024     8:35 AM  "5/29/2024     8:40 AM 5/29/2024     8:45 AM 5/29/2024     9:10 AM 7/10/2024     2:54 PM 7/23/2024     2:42 PM   Vitals   Systolic 113 125 120 121 129 121    Diastolic 62 72 76 61 64 88    Heart Rate 59 63 60 86 72 95    Temp 36.3 °C (97.3 °F)   36.4 °C (97.5 °F) 36.4 °C (97.5 °F) 36 °C (96.8 °F)    Resp 16   16 16 13    Height (in) 1.6 m (5' 3\")     1.6 m (5' 3\")    Weight (lb)      138 138   BMI 25.38 kg/m2     24.45 kg/m2 24.45 kg/m2   BSA (m2) 1.7 m2     1.67 m2 1.67 m2   Visit Report      Report       There is no height or weight on file to calculate BMI.    GENERAL: A/Ox3, NAD. Appears healthy, well nourished  PSYCHIATRIC: Mood stable, appropriate memory recall  EYES: EOM intact, no scleral icterus  CARDIOVASCULAR: Palpable peripheral pulses  LUNGS: Breathing non-labored on room air  SKIN: no erythema, rashes, or ecchymoses     MUSCULOSKELETAL:  Laterality: left Knee Exam  - Skin intact  - No erythema or warmth  - No ecchymosis or soft tissue swelling  - Alignment: neutral  - Palpation: Positive TTP medial joint line  - ROM: 0-0-120  - Effusion: trace  - Strength: knee extension and flexion 5/5, EHL/PF/DF motor intact  - Stability:        Anterior drawer stable       Posterior drawer stable       Varus/valgus stable       negative Lachman  - positive Hilda's  - Gait: trace antalgic  - Hip Exam: flexion to 100+ degrees, full extension, internal/external rotation adequate, and no pain with log roll    NEUROVASCULAR:  - Neurovascular Status: sensation intact to light touch distally, lower extremity motor intact  - Capillary refill brisk at extremities, Bilateral dorsalis pedis pulse 2+    Imaging: Multiple views of the affected left knee(s) demonstrate: no acute osseous abnormality, no fracture, no significant degenerative changes.   X-rays were personally reviewed and interpreted by me.  Radiology reports were reviewed by me as well, if readily available at the time.        Tr Dowling MD  Attending " Surgeon    Sports Medicine Orthopaedic Surgery  [facility] [place]  [employer] [facility] [employer]

## 2024-08-12 ENCOUNTER — APPOINTMENT (OUTPATIENT)
Dept: BEHAVIORAL HEALTH | Facility: CLINIC | Age: 54
End: 2024-08-12
Payer: MEDICARE

## 2024-08-12 DIAGNOSIS — F43.10 PTSD (POST-TRAUMATIC STRESS DISORDER): ICD-10-CM

## 2024-08-12 PROCEDURE — 99213 OFFICE O/P EST LOW 20 MIN: CPT | Performed by: PSYCHIATRY & NEUROLOGY

## 2024-08-12 ASSESSMENT — ENCOUNTER SYMPTOMS
SLEEP DISTURBANCE: 1
NERVOUS/ANXIOUS: 1

## 2024-08-12 NOTE — PROGRESS NOTES
"Subjective   Patient ID: Suyapa Thao is a 53 y.o. female who presents for video conference med management Virtual or Telephone Consent    An interactive audio and video telecommunication system which permits real time communications between the patient (at the originating site) and provider (at the distant site) was utilized to provide this telehealth service.   Verbal consent was requested and obtained from Suyapa Thao on this date, 08/12/24 for a telehealth visit.  .  HPI patient reports feeling \"the same\" she is grateful for her dog and for her son and mother.  She tends to watch too much news and then get triggered by women and children being in harm's way in the [place] war.  Therapist has told her not to watch news patient is smoking more often.    Review of Systems   Psychiatric/Behavioral:  Positive for sleep disturbance. The patient is nervous/anxious.         Voice sounds less hoarse       Objective   Physical Exam  Psychiatric:         Attention and Perception: Attention and perception normal.         Mood and Affect: Mood is anxious. Affect is blunt.         Speech: Speech is delayed.         Behavior: Behavior is withdrawn. Behavior is cooperative.         Thought Content: Thought content normal. Thought content does not include homicidal or suicidal ideation. Thought content does not include homicidal or suicidal plan.         Cognition and Memory: Cognition and memory normal.         Judgment: Judgment normal.         Assessment/Plan   Problem List Items Addressed This Visit             ICD-10-CM    PTSD (post-traumatic stress disorder) F43.10     Reviewed meds with patient.  Continue current med regimen to prevent symptom recurrence.  Follow-up 2 months                 Alverto Griffin MD 08/12/24 6:07 PM   "

## 2024-08-12 NOTE — ASSESSMENT & PLAN NOTE
Reviewed meds with patient.  Continue current med regimen to prevent symptom recurrence.  Follow-up 2 months

## 2024-08-14 ENCOUNTER — APPOINTMENT (OUTPATIENT)
Dept: UROLOGY | Facility: CLINIC | Age: 54
End: 2024-08-14
Payer: MEDICARE

## 2024-08-14 VITALS
SYSTOLIC BLOOD PRESSURE: 113 MMHG | BODY MASS INDEX: 24.45 KG/M2 | DIASTOLIC BLOOD PRESSURE: 76 MMHG | HEART RATE: 102 BPM | HEIGHT: 63 IN

## 2024-08-14 DIAGNOSIS — N39.46 MIXED STRESS AND URGE URINARY INCONTINENCE: Primary | ICD-10-CM

## 2024-08-14 DIAGNOSIS — N32.81 OAB (OVERACTIVE BLADDER): ICD-10-CM

## 2024-08-14 DIAGNOSIS — N95.8 GENITOURINARY SYNDROME OF MENOPAUSE: ICD-10-CM

## 2024-08-14 LAB
POC BILIRUBIN, URINE: ABNORMAL
POC BLOOD, URINE: ABNORMAL
POC GLUCOSE, URINE: NEGATIVE MG/DL
POC KETONES, URINE: ABNORMAL MG/DL
POC LEUKOCYTES, URINE: NEGATIVE
POC NITRITE,URINE: NEGATIVE
POC PH, URINE: 5.5 PH
POC PROTEIN, URINE: NEGATIVE MG/DL
POC SPECIFIC GRAVITY, URINE: >=1.03
POC UROBILINOGEN, URINE: 0.2 EU/DL

## 2024-08-14 PROCEDURE — 81003 URINALYSIS AUTO W/O SCOPE: CPT

## 2024-08-14 PROCEDURE — G2211 COMPLEX E/M VISIT ADD ON: HCPCS

## 2024-08-14 PROCEDURE — 99214 OFFICE O/P EST MOD 30 MIN: CPT

## 2024-08-14 NOTE — PROGRESS NOTES
[facility] [place]  Outpatient Clinic Note    Patient: Suyapa Thao  Age/Sex: 53 y.o., female  MRN: [id number]    Chief Complaint: 3-month follow-up         History of Present Illness  This is a 53 y.o. female, who presents for follow-up for mixed urinary incontinence and overactive bladder.  The patient was prescribed Myrbetriq and reports the medication has not helped her symptoms.  The patient is equally bothered by urge incontinence and stress incontinence.  she previously tried Oxybutynin that caused dry mouth and did not improve her symptoms. She wakes up 3-4 times a night to go to the bathroom. This is affecting her mental health.  The patient has become embarrassed due to the stress incontinence with intercourse. She denies dysuria, gross hematuria, flank pain, pelvic pain, vaginal bulging, fever or chills. She is sexually active. No history of breast cancer. She is a cigarette smoker, smoking 6-8 cigarettes a day for the past 20 years. She denies any pelvic surgeries. Patient has a past medical history of PTSD, she served in the .  The patient would like it mentioned in her chart that if she has to go to the operating room she needs to be sedated due to flashbacks.    Past Medical & Surgical History  Past Medical History:   Diagnosis Date    ADHD (attention deficit hyperactivity disorder)     Adjustment disorder     Anxiety     Chronic pain disorder     Depression     Hallucination     Memory loss     Panic attack     Post-traumatic stress disorder, unspecified 08/02/2022    PTSD (post-traumatic stress disorder)    Psychiatric illness     PTSD (post-traumatic stress disorder)     Sleep difficulties      Past Surgical History:   Procedure Laterality Date    OTHER SURGICAL HISTORY  03/29/2022    Vocal Cordectomy    OTHER SURGICAL HISTORY  03/29/2022    Breast surgery/ augmentation       Family History  Family History   Problem Relation Name Age of Onset    Hypertension Mother      Heart disease Father       Diabetes Father      Hypertension Father         Social History  She reports that she has been smoking cigarettes. She has never used smokeless tobacco. She reports that she does not currently use alcohol. She reports that she does not use drugs.    Allergies  Patient has no known allergies.    Medications:  Current Outpatient Medications on File Prior to Visit   Medication Sig Dispense Refill    albuterol 90 mcg/actuation inhaler INHALE 2 PUFFS BY MOUTH EVERY 4 HOURS AS NEEDED FOR WHEEZING OR SHORTNESS OF BREATH 8.5 g 2    clonazePAM (KlonoPIN) 1 mg tablet Take 1 tablet (1 mg) by mouth 2 times a day. 180 tablet 0    escitalopram (Lexapro) 20 mg tablet Take 1 tablet (20 mg) by mouth once daily. 90 tablet 3    estradiol (Vagifem) 10 mcg tablet vaginal tablet INSERT 1 TABLET VAGINALLY AT BEDTIME TWICE A WEEK 8 tablet 2    Incassia 0.35 mg tablet TAKE 1 TABLET DAILY 84 tablet 3    LORazepam (Ativan) 0.5 mg tablet Take 1-2 tablets (0.5-1 mg) by mouth see administration instructions. Take 30 minutes prior to procedure. 2 tablet 0    [] mirabegron (Myrbetriq) 50 mg tablet extended release 24 hr 24 hr tablet Take 1 tablet (50 mg) by mouth once daily. LOT [id number]  EXP 2026 84 tablet 0    norethindrone (Lyleq) 0.35 mg tablet Take 1 tablet (0.35 mg) by mouth once daily. 28 tablet 12    ospemifene (Osphena) 60 mg tablet Take 1 tablet by mouth once daily.      prasterone, dhea, (Intrarosa) 6.5 mg insert Insert 6.5 mg into the vagina once daily. 30 each 3    prazosin (Minipress) 5 mg capsule Take 1 capsule (5 mg) by mouth once daily at bedtime. For nightmares 90 capsule 3    QUEtiapine (SEROquel) 200 mg tablet Take 1 tablet (200 mg) by mouth once daily at bedtime. 90 tablet 3    risperiDONE (RisperDAL) 1 mg tablet TAKE 1 TABLET BY MOUTH EVERY MORNING AND TAKE 1 TABLET BY MOUTH EVERY EVENING. 180 tablet 3    rosuvastatin (Crestor) 5 mg tablet TAKE 1 TABLET(5 MG) BY MOUTH DAILY 90 tablet 1    tiZANidine (Zanaflex)  2 mg tablet Take 1 tablet (2 mg) by mouth 3 times a day.       No current facility-administered medications on file prior to visit.        Review of Systems   A comprehensive 10+ review of systems was negative except for: see hpi          Physical Exam                                                                                                                      General: Well developed, well nourished, alert and cooperative, appears in no acute distress  Head: Normocephalic, atraumatic  Neck: supple, trachea midline  Eyes: Non-injected conjunctiva, sclera clear, no proptosis  Cardiac: Extremities are warm and well perfused. No edema, cyanosis or pallor.   Lungs: Breathing is easy, non-labored. Speaking in clear and complete sentences. Normal diaphragmatic movement.  Abdomen: soft, non-distended, non-tender, no rebound or guarding, no hernia and no CVA tenderness   MSK: Ambulatory with steady gait, unassisted  Neuro: alert and oriented to person, place and time  Psych: Demonstrates good judgement and reason, without hallucinations, abnormal affect or abnormal behaviors.  Skin: no obvious lesions, no rashes      Labs  N/A    Imaging  N/A    IMPRESSION AND PLAN:  This is a 53 y.o. female, presents with MCIHAEL, OAB, GSM. Patient has a past medical history of PTSD, she served in the .  The patient would like it mentioned in her chart that if she has to go to the operating room she needs to be sedated due to flashbacks.     MICHAEL: Equally as bothersome  -discussed mechanism of UUI and TEETEE, and treatment options for both including PFT, pessary, sling for TEETEE and PFT, pharmacotherapy and third-line therapy for OAB  -Education handouts given to patient on treatment options  -Bulkamid is associated with slightly less success rate of a sling about 60 to 70% of women having >90% improvement. However, there seems to be similar long-term success compared to sling with fewer side-effects. Main AE is urinary retention which  resolves within 24 hours of using a 10-12 [place] catheter.  I discussed that if she still has some leakage after her procedure, she could perform another injection within 4 weeks and this procedure being performed in the office.   -We discussed the sling procedure in depth with her there is a long-term success rate of 70-80% complete continence and up to 90% significant improvement up to 10 years after surgery, though the sling is meant to last a lifetime, there is a <5% risk of subsequent surgery, either revision or excision within 9 years. The major complications include bladder perforation with sling placement <1%, retention requiring sling lysis 1-3%, transient retention requiring 2-3 days of catheter drainage 33%, and mesh erosion 1-3%.      OAB  -Failed Oxybutynin and Myrbetriq  -Stop Myrbetriq  -we discussed botox vs sacral neuromodulation: both have similar efficacy 80% patients reports >50% improvement, botox associated with 5% risk of incomplete emptying, increase in UTI and will require re-injection in 6-9 months; and as early as 3 months. SNM is a staged procedure, 2 weeks apart, consisting first of lead implantation then internalization of IPG if there is improvement. Interstim is associated with lead migration, explantation, infection and bleeding, though risks are all <5%. We also discussed PTNS which is associated with success rates comparable to medical therapy but without side-effects without significant major morbidity.   -Educational handouts given on third line options     GSM  -Continue on Vagifem     The patient would like to see Dr. Nolasco to discuss her options for urge incontinence and stress incontinence.  They are equally as bothersome.  We had a lengthy discussion on treatment options the patient is interested in urethral bulking.  We discussed that she might not be a candidate for Botox if she was to proceed with urethral bulking.  The patient would like to discuss further with   Gaurav.     Follow-up with Dr. Nolasco on 9/6    All questions and concerns were answered and addressed.  The patient expressed understanding and agrees with the plan.     Reviewed and approved by ANGELIQUE ALONZO on 8/14/24 at 8:22 AM.

## 2024-08-15 ENCOUNTER — HOSPITAL ENCOUNTER (OUTPATIENT)
Dept: RADIOLOGY | Facility: CLINIC | Age: 54
Discharge: HOME | End: 2024-08-15
Payer: MEDICARE

## 2024-08-15 DIAGNOSIS — S83.207A TEAR OF LEFT MENISCUS AS CURRENT INJURY, INITIAL ENCOUNTER: ICD-10-CM

## 2024-08-15 PROCEDURE — 73721 MRI JNT OF LWR EXTRE W/O DYE: CPT | Mod: LT

## 2024-08-16 ENCOUNTER — TELEPHONE (OUTPATIENT)
Dept: UROLOGY | Facility: CLINIC | Age: 54
End: 2024-08-16
Payer: MEDICARE

## 2024-08-16 NOTE — TELEPHONE ENCOUNTER
Patient calling had some questions on the results of her urine that she had done in office from the 14th . Asking if someone can call her and talk to her.

## 2024-08-18 NOTE — PROGRESS NOTES
Subjective   Patient ID: Suyapa Thao is a 53 y.o. female who presents for review MRI results (Heavy periods).  Negative pap 8/22/2023 and negative pap and HPV on 12/31/2021.    Past ultrasound suggested a left sided uterine fibroid, but MRI performed on 9/15/2023 revealed the presence of uterine adenomyosis and prominent vulvar vascular lymphatics vs potential lymphangioma:  The uterus is anteverted and measures 8 x 8 x 4 cm.  The junctional zone is thick with internal cystic changes and heterogeneous arterial phase enhancement compatible with adenomyosis including portion posteriorly measuring 2.2 cm thickness with lobulated margin sagittal series 5, image 14. The uterus has a globular appearance. There is splaying of the uterine horns suggestive of uterine anomaly with features of septate versus bicornuate. The endometrium has normal thickness and appearance.  There are nabothian cysts present. There are multiple thin walled fluid signal intensity nonenhancing tubular cystic changes bilateral vulva dominant components inferiorly representative portion left side series 3, image 41 measuring 17 mm AP diameter compared with 11 mm right-side series 3, image 41 most suggestive of prominent vascular lymphatics, potentially lymphangioma/lymphatic malformation.     Prior to last visit she was taking progestin-only oral contraceptive pill for menstrual control along with using Vagifem for vaginal dryness.   Estradiol and FSH 7/2023 returned not showing signs of menopause.   Menses on progestin only OCP is irregular. Menses last 10-14 days. She feels the cramps are better on the progestin. She denies any hot flushes.    She has been told she should have a hysterectomy by one prior gynecologist, and was told she should use a progestin releasing IUD by another.   Endometrial biopsy with me 3/28/2024 returned with fragment of endometrial polyp in background of menstrual endometrium. A polyp was not visualized on prior MRI or  ultrasound so this is likely very small. We did review that since this biopsy returned benign she is a candidate for either treatment option, along with simple observation. FSH and estradiol 3/28/2024 returned not in menopausal range but trending towards this compared to 8 months ago.   Endometrial biopsy returned benign:  A. ENDOMETRIUM BIOPSY:   -- FRAGMENTS OF PROLIFERATIVE ENDOMETRIUM WITH STROMAL CHANGES SUGGESTIVE OF ENDOMETRIAL POLYP IN A BACKGROUND OF MENSTRUAL PATTERN ENDOMETRIUM    She has desired to continue with progestin only OCP during her transition into menopause since menstrual bleeding is controlled.    Pelvic MRI was ordered to be repeated by PCP and performed on 7/23/2024:  UTERUS:  The uterus is anteverted/retroverted and measures 8 x 4.6 x 8.5 cm. Splaying of the uterine horns as in prior could be related to underlying congenital malformation.      Globular shape uterus. The junctional zone is thickened with internal cystic changes and heterogenous enhancement consistent with the known adenomyosis measuring up to 2.3 cm. The endometrium has normal thickness and appearance. Few uterine T2 hypointense lesions largest in the left side measuring 1 cm (series 4, image 15) likely small fibroids. Few nabothian cysts noted.      Unchanged scattered perineal nonenhancing cystic foci possibly representing vascular malformation/lymphangioma.      OVARIES/ADNEXA:  Small size diminutive ovaries with a few small follicles/cysts in the right side.    She is also seeing Dr. Nolasco for mixed urinary incontinence.    Menses in July was very heavy and lasted 10-13 days. She started again with brown bleeding only for the past week.     She notes vulvar itching and burning. This is getting better over the past day and has been present for  maybe 14 days. She admits to having new partners and is not using condoms for STD prevention. She desires STD screen. She has a distant history of genital herpes but feels this is  different than prior outbreaks. She declines suppressive therapy and desires to use valacyclovir episodic.             Review of Systems   Constitutional:  Negative for activity change.   HENT:  Negative for congestion.    Respiratory:  Negative for apnea and cough.    Cardiovascular:  Negative for chest pain.   Gastrointestinal:  Negative for constipation and diarrhea.   Genitourinary:  Negative for hematuria and vaginal pain.   Musculoskeletal:  Negative for joint swelling.   Neurological:  Negative for dizziness.   Psychiatric/Behavioral:  Negative for agitation.        Past Medical History:   Diagnosis Date    ADHD (attention deficit hyperactivity disorder)     Adjustment disorder     Anxiety     Chronic pain disorder     Depression     Hallucination     Memory loss     Panic attack     Post-traumatic stress disorder, unspecified 08/02/2022    PTSD (post-traumatic stress disorder)    Psychiatric illness     PTSD (post-traumatic stress disorder)     Sleep difficulties       Past Surgical History:   Procedure Laterality Date    OTHER SURGICAL HISTORY  03/29/2022    Vocal Cordectomy    OTHER SURGICAL HISTORY  03/29/2022    Breast surgery/ augmentation      No Known Allergies   Current Outpatient Medications on File Prior to Visit   Medication Sig Dispense Refill    albuterol 90 mcg/actuation inhaler INHALE 2 PUFFS BY MOUTH EVERY 4 HOURS AS NEEDED FOR WHEEZING OR SHORTNESS OF BREATH 8.5 g 2    clonazePAM (KlonoPIN) 1 mg tablet Take 1 tablet (1 mg) by mouth 2 times a day. 180 tablet 0    escitalopram (Lexapro) 20 mg tablet Take 1 tablet (20 mg) by mouth once daily. 90 tablet 3    estradiol (Vagifem) 10 mcg tablet vaginal tablet INSERT 1 TABLET VAGINALLY AT BEDTIME TWICE A WEEK 8 tablet 2    Incassia 0.35 mg tablet TAKE 1 TABLET DAILY 84 tablet 3    LORazepam (Ativan) 0.5 mg tablet Take 1-2 tablets (0.5-1 mg) by mouth see administration instructions. Take 30 minutes prior to procedure. 2 tablet 0    norethindrone  (Lyleq) 0.35 mg tablet Take 1 tablet (0.35 mg) by mouth once daily. 28 tablet 12    ospemifene (Osphena) 60 mg tablet Take 1 tablet by mouth once daily.      prasterone, dhea, (Intrarosa) 6.5 mg insert Insert 6.5 mg into the vagina once daily. 30 each 3    prazosin (Minipress) 5 mg capsule Take 1 capsule (5 mg) by mouth once daily at bedtime. For nightmares 90 capsule 3    QUEtiapine (SEROquel) 200 mg tablet Take 1 tablet (200 mg) by mouth once daily at bedtime. 90 tablet 3    risperiDONE (RisperDAL) 1 mg tablet TAKE 1 TABLET BY MOUTH EVERY MORNING AND TAKE 1 TABLET BY MOUTH EVERY EVENING. 180 tablet 3    rosuvastatin (Crestor) 5 mg tablet TAKE 1 TABLET(5 MG) BY MOUTH DAILY 90 tablet 1    tiZANidine (Zanaflex) 2 mg tablet Take 1 tablet (2 mg) by mouth 3 times a day.       No current facility-administered medications on file prior to visit.        Objective   Physical Exam  Constitutional:       Appearance: Normal appearance.   Cardiovascular:      Rate and Rhythm: Normal rate and regular rhythm.   Pulmonary:      Effort: Pulmonary effort is normal.      Breath sounds: Normal breath sounds.   Abdominal:      Palpations: Abdomen is soft. There is no mass.      Tenderness: There is no abdominal tenderness.      Hernia: No hernia is present.   Genitourinary:     General: Normal vulva.      Exam position: Lithotomy position.      Labia:         Right: No lesion.         Left: No lesion.       Urethra: No urethral lesion.      Vagina: Normal. No lesions or prolapsed vaginal walls.      Cervix: No lesion.      Uterus: Normal. Not enlarged, not tender and no uterine prolapse.       Adnexa: Right adnexa normal and left adnexa normal.        Right: No mass or tenderness.          Left: No mass or tenderness.        Comments: Erythema is noted of labia majora and minora with scattered shallow ulcerations.   Skin:     General: Skin is warm and dry.   Neurological:      Mental Status: She is alert.           Problem List Items  Addressed This Visit       Vaginal atrophy    Overview     Vagifem is prescribed for vaginal dryness. She is also using a lubricant.          Relevant Orders    Vaginitis Gram Stain For Bacterial Vaginosis + Yeast    Menorrhagia with irregular cycle - Primary    Overview     Heavy menses. Imaging shows evidence of adenomyosis.  Endometrial biopsy 3/28/2024 returned with fragment of endometrial polyp in background of menstrual endometrium. A polyp was not visualized on prior MRI or ultrasound so this is likely very small.  We discussed management options of observation, continuing progestin only pills, progestin releasing IUD and hysterectomy. She elected to continue progestin only pills.   TSH returned in normal range. FSH and estradiol are trending toward menopause.   She desires to continue with progestin only OCP and declines surgical intervention.  Lysteda is prescribed to take if menses are heavy.         Relevant Medications    tranexamic acid (Lysteda) 650 mg tablet tablet    Herpes genitalis in women    Overview     Herpes diagnosis was given many years ago. She declines suppressive therapy, preferring to take valacyclovir with outbreaks.          Current Assessment & Plan     Condoms are recommended to reduce transmission of STD.   STD screen is sent. Valacyclovir is prescribed.          Relevant Medications    valACYclovir (Valtrex) 500 mg tablet    Adenomyosis of the uterus    Overview     Pelvic ultrasound in August 2023 had suggested uterine fibroid. MRI 9/15/2023 revealed uterine adenomyosis with normal appearing endometrium, no evidence of fibroid and no adnexal abnormality.  Menses are heavy and with cramps. She has had some improvement with progestin only OCP.  Endometrial biopsy returned benign with fragment of polyp. FSH and estradiol are trending  towards menopause.   We discussed management options of observation, continuing progestin only pills, progestin releasing IUD and hysterectomy.           Relevant Medications    tranexamic acid (Lysteda) 650 mg tablet tablet    Acute vaginitis    Overview     She notes vulvar itching and burning along with sores. STD screen is sent along with gram stain. Exam is suspicious for herpes outbreak and she has a history of this in the past.         Current Assessment & Plan     STD screen is sent including blood testing. Valacyclovir is prescribed for suspected herpes outbreak.   Condoms are recommended. She declines suppressive therapy for herpes.   Gram stain is also sent for possible yeast.           Other Visit Diagnoses       Encounter for screening mammogram for malignant neoplasm of breast        Relevant Orders    BI mammo bilateral screening tomosynthesis    Screening for STD (sexually transmitted disease)        Relevant Orders    Hepatitis B surface Ag    Hepatitis C Antibody    HIV 1/2 Antigen/Antibody Screen with Reflex to Confirmation    Syphilis Screen with Reflex    C. Trachomatis / N. Gonorrhoeae, Amplified Detection

## 2024-08-19 ENCOUNTER — TELEPHONE (OUTPATIENT)
Dept: UROLOGY | Facility: CLINIC | Age: 54
End: 2024-08-19
Payer: MEDICARE

## 2024-08-21 ENCOUNTER — LAB (OUTPATIENT)
Dept: LAB | Facility: LAB | Age: 54
End: 2024-08-21
Payer: MEDICARE

## 2024-08-21 ENCOUNTER — APPOINTMENT (OUTPATIENT)
Dept: OBSTETRICS AND GYNECOLOGY | Facility: CLINIC | Age: 54
End: 2024-08-21
Payer: MEDICARE

## 2024-08-21 VITALS — BODY MASS INDEX: 24.45 KG/M2 | SYSTOLIC BLOOD PRESSURE: 110 MMHG | WEIGHT: 138 LBS | DIASTOLIC BLOOD PRESSURE: 70 MMHG

## 2024-08-21 DIAGNOSIS — Z12.31 ENCOUNTER FOR SCREENING MAMMOGRAM FOR MALIGNANT NEOPLASM OF BREAST: ICD-10-CM

## 2024-08-21 DIAGNOSIS — A60.09 HERPES GENITALIS IN WOMEN: ICD-10-CM

## 2024-08-21 DIAGNOSIS — N95.2 VAGINAL ATROPHY: ICD-10-CM

## 2024-08-21 DIAGNOSIS — Z11.3 SCREENING FOR STD (SEXUALLY TRANSMITTED DISEASE): ICD-10-CM

## 2024-08-21 DIAGNOSIS — N76.0 ACUTE VAGINITIS: ICD-10-CM

## 2024-08-21 DIAGNOSIS — N80.03 ADENOMYOSIS OF THE UTERUS: ICD-10-CM

## 2024-08-21 DIAGNOSIS — N92.1 MENORRHAGIA WITH IRREGULAR CYCLE: Primary | ICD-10-CM

## 2024-08-21 PROCEDURE — 87340 HEPATITIS B SURFACE AG IA: CPT

## 2024-08-21 PROCEDURE — 87491 CHLMYD TRACH DNA AMP PROBE: CPT

## 2024-08-21 PROCEDURE — 86780 TREPONEMA PALLIDUM: CPT

## 2024-08-21 PROCEDURE — 87205 SMEAR GRAM STAIN: CPT

## 2024-08-21 PROCEDURE — 99214 OFFICE O/P EST MOD 30 MIN: CPT | Performed by: OBSTETRICS & GYNECOLOGY

## 2024-08-21 PROCEDURE — 87389 HIV-1 AG W/HIV-1&-2 AB AG IA: CPT

## 2024-08-21 PROCEDURE — 36415 COLL VENOUS BLD VENIPUNCTURE: CPT

## 2024-08-21 PROCEDURE — 87591 N.GONORRHOEAE DNA AMP PROB: CPT

## 2024-08-21 PROCEDURE — 4004F PT TOBACCO SCREEN RCVD TLK: CPT | Performed by: OBSTETRICS & GYNECOLOGY

## 2024-08-21 PROCEDURE — 86803 HEPATITIS C AB TEST: CPT

## 2024-08-21 RX ORDER — VALACYCLOVIR HYDROCHLORIDE 500 MG/1
500 TABLET, FILM COATED ORAL 2 TIMES DAILY
Qty: 10 TABLET | Refills: 11 | Status: SHIPPED | OUTPATIENT
Start: 2024-08-21 | End: 2024-08-26

## 2024-08-21 RX ORDER — TRANEXAMIC ACID 650 MG/1
1300 TABLET ORAL 3 TIMES DAILY
Qty: 30 TABLET | Refills: 3 | Status: SHIPPED | OUTPATIENT
Start: 2024-08-21

## 2024-08-21 ASSESSMENT — ENCOUNTER SYMPTOMS
DIARRHEA: 0
DIZZINESS: 0
JOINT SWELLING: 0
COUGH: 0
CONSTIPATION: 0
HEMATURIA: 0
ACTIVITY CHANGE: 0
AGITATION: 0
APNEA: 0

## 2024-08-21 NOTE — ASSESSMENT & PLAN NOTE
STD screen is sent including blood testing. Valacyclovir is prescribed for suspected herpes outbreak.   Condoms are recommended. She declines suppressive therapy for herpes.   Gram stain is also sent for possible yeast.

## 2024-08-21 NOTE — ASSESSMENT & PLAN NOTE
Condoms are recommended to reduce transmission of STD.   STD screen is sent. Valacyclovir is prescribed.

## 2024-08-22 LAB
C TRACH RRNA SPEC QL NAA+PROBE: NEGATIVE
HBV SURFACE AG SERPL QL IA: NONREACTIVE
HCV AB SER QL: NONREACTIVE
HIV 1+2 AB+HIV1 P24 AG SERPL QL IA: NONREACTIVE
N GONORRHOEA DNA SPEC QL PROBE+SIG AMP: NEGATIVE
TREPONEMA PALLIDUM IGG+IGM AB [PRESENCE] IN SERUM OR PLASMA BY IMMUNOASSAY: NONREACTIVE

## 2024-08-23 LAB
CLUE CELLS VAG LPF-#/AREA: NORMAL /[LPF]
NUGENT SCORE: 0
YEAST VAG WET PREP-#/AREA: NORMAL

## 2024-09-03 ENCOUNTER — TELEPHONE (OUTPATIENT)
Dept: OBSTETRICS AND GYNECOLOGY | Facility: CLINIC | Age: 54
End: 2024-09-03

## 2024-09-03 ENCOUNTER — APPOINTMENT (OUTPATIENT)
Dept: OBSTETRICS AND GYNECOLOGY | Facility: CLINIC | Age: 54
End: 2024-09-03
Payer: MEDICARE

## 2024-09-03 NOTE — TELEPHONE ENCOUNTER
Patient would like a call back with her results please and would also like to know if a herpes test was ran on her when she was here.

## 2024-09-04 ENCOUNTER — APPOINTMENT (OUTPATIENT)
Dept: ORTHOPEDIC SURGERY | Facility: CLINIC | Age: 54
End: 2024-09-04
Payer: MEDICARE

## 2024-09-04 VITALS — WEIGHT: 138 LBS | BODY MASS INDEX: 24.45 KG/M2 | HEIGHT: 63 IN

## 2024-09-04 DIAGNOSIS — M22.2X2 PATELLOFEMORAL PAIN SYNDROME OF LEFT KNEE: ICD-10-CM

## 2024-09-04 DIAGNOSIS — M22.42 PATELLA, CHONDROMALACIA, LEFT: Primary | ICD-10-CM

## 2024-09-04 PROCEDURE — 3008F BODY MASS INDEX DOCD: CPT | Performed by: STUDENT IN AN ORGANIZED HEALTH CARE EDUCATION/TRAINING PROGRAM

## 2024-09-04 PROCEDURE — 4004F PT TOBACCO SCREEN RCVD TLK: CPT | Performed by: STUDENT IN AN ORGANIZED HEALTH CARE EDUCATION/TRAINING PROGRAM

## 2024-09-04 PROCEDURE — 99214 OFFICE O/P EST MOD 30 MIN: CPT | Performed by: STUDENT IN AN ORGANIZED HEALTH CARE EDUCATION/TRAINING PROGRAM

## 2024-09-04 NOTE — PROGRESS NOTES
PRIMARY CARE PHYSICIAN: Brennan Kinney MD  ORTHOPAEDIC FOLLOW-UP: Knee Evaluation    ASSESSMENT & PLAN    Impression: 53 y.o. 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    Plan:   I reviewed with the patient the nature of their diagnosis.  I reviewed their imaging studies with them.    Based on the history, physical exam and imaging studies above, the patient's presentation is consistent with the above diagnosis.  I had a long discussion with the patient regarding their presentation and the treatment options.  We discussed initial nonoperative versus operative management options as well as potential further diagnostic imaging. Patient had an injury, symptoms, and physical exam findings concerning for a medial mensicus tear of the left knee.  However, her MRI is negative for a medial meniscus injury.  All of her findings on the MRI appeared to be localized to the patellofemoral joint where she has some chondromalacia causing her patellofemoral pain syndrome.  I recommend quadricep strengthening and hamstring flexibility.  She was provided with a home exercise program as well as a referral to physical therapy.  She will focus on low impact activities.  She can take anti-inflammatories as needed.  She will practice good weight management and return to see me as needed.    Follow-Up: Patient will follow-up as needed    At the end of the visit, all questions were answered in full. The patient is in agreement with the plan and recommendations. They will call the office with any questions/concerns.    Note dictated with [facility] software. Completed without full typed error editing and sent to avoid delay.     SUBJECTIVE  CHIEF COMPLAINT: Left knee pain       HPI: Suyapa Thao is a 53 y.o. patient who is following up for her MRI results done 8/15/2024.  She localizes her pain today in the anterior aspect of the knee along the patellofemoral joint.   She has difficulty with stairs both up and down.  She has pain and crepitus in the patellofemoral joint.    8/5/2024 HPI Visit Info:   Suyapa Thao complains of left knee pain. Patient injured the left knee approximately 2 years ago when she was coming down a ladder from her attic, mis-stepped, planted and twisted the left knee, causing her knee buckle and subsequently fell. She presented to an Urgent Care shortly afterwards who referred her to Orthopedic Surgery. She followed-up with an Ortho specialist who referred her to PT for concern of possible medial meniscus tear. Patient worked with physical therapy at an outside location for greater than 6 weeks however continued to have pain. She reports pain with increased activity, specifically walking, going up/down stairs, and certain lateral/twisting motions. She describes the pain as sharp at times, localized to the medial joint line and anterior knee. Patient does complain of mechanical symptoms including, catching, locking and clicking of the left knee. She intermittent swelling. She has continued to work on her exercises from physical therapy at home with no relief. She has also tried consistent oral anti-inflammatory use with ibuprofen, ice, bracing and activity modification without relief.      REVIEW OF SYSTEMS  Constitutional: See HPI for pain assessment, No significant weight loss, recent trauma  Cardiovascular: No chest pain, shortness of breath  Respiratory: No difficulty breathing, cough  Gastrointestinal: No nausea, vomiting, diarrhea, constipation  Musculoskeletal: Noted in HPI, positive for pain, restricted motion, stiffness  Integumentary: No rashes, easy bruising, redness   Neurological: no numbness or tingling in extremities, no gait disturbances   Psychiatric: No mood changes, memory changes, social issues  Heme/Lymph: no excessive swelling, easy bruising, excessive bleeding  ENT: No hearing changes  Eyes: No vision changes    Past Medical History:    Diagnosis Date    ADHD (attention deficit hyperactivity disorder)     Adjustment disorder     Anxiety     Chronic pain disorder     Depression     Hallucination     Memory loss     Panic attack     Post-traumatic stress disorder, unspecified 08/02/2022    PTSD (post-traumatic stress disorder)    Psychiatric illness     PTSD (post-traumatic stress disorder)     Sleep difficulties         No Known Allergies     Past Surgical History:   Procedure Laterality Date    OTHER SURGICAL HISTORY  03/29/2022    Vocal Cordectomy    OTHER SURGICAL HISTORY  03/29/2022    Breast surgery/ augmentation        Family History   Problem Relation Name Age of Onset    Hypertension Mother      Heart disease Father      Diabetes Father      Hypertension Father          Social History     Socioeconomic History    Marital status: Single     Spouse name: Not on file    Number of children: Not on file    Years of education: Not on file    Highest education level: Not on file   Occupational History    Occupation: not currently   Tobacco Use    Smoking status: Every Day     Current packs/day: 0.25     Types: Cigarettes    Smokeless tobacco: Never   Vaping Use    Vaping status: Former   Substance and Sexual Activity    Alcohol use: Not Currently     Comment: rarely    Drug use: Never    Sexual activity: Not Currently     Partners: Male     Birth control/protection: OCP   Other Topics Concern    Not on file   Social History Narrative    Not on file     Social Determinants of Health     Financial Resource Strain: Not on file   Food Insecurity: No Food Insecurity (7/10/2024)    Hunger Vital Sign     Worried About Running Out of Food in the Last Year: Never true     Ran Out of Food in the Last Year: Never true   Transportation Needs: Not on file   Physical Activity: Not on file   Stress: Not on file   Social Connections: Not on file   Intimate Partner Violence: Not on file   Housing Stability: Not on file        CURRENT MEDICATIONS:   Current  Outpatient Medications   Medication Sig Dispense Refill    albuterol 90 mcg/actuation inhaler INHALE 2 PUFFS BY MOUTH EVERY 4 HOURS AS NEEDED FOR WHEEZING OR SHORTNESS OF BREATH 8.5 g 2    clonazePAM (KlonoPIN) 1 mg tablet Take 1 tablet (1 mg) by mouth 2 times a day. 180 tablet 0    escitalopram (Lexapro) 20 mg tablet Take 1 tablet (20 mg) by mouth once daily. 90 tablet 3    estradiol (Vagifem) 10 mcg tablet vaginal tablet INSERT 1 TABLET VAGINALLY AT BEDTIME TWICE A WEEK 8 tablet 2    Incassia 0.35 mg tablet TAKE 1 TABLET DAILY 84 tablet 3    LORazepam (Ativan) 0.5 mg tablet Take 1-2 tablets (0.5-1 mg) by mouth see administration instructions. Take 30 minutes prior to procedure. 2 tablet 0    norethindrone (Lyleq) 0.35 mg tablet Take 1 tablet (0.35 mg) by mouth once daily. 28 tablet 12    ospemifene (Osphena) 60 mg tablet Take 1 tablet by mouth once daily.      prasterone, dhea, (Intrarosa) 6.5 mg insert Insert 6.5 mg into the vagina once daily. 30 each 3    prazosin (Minipress) 5 mg capsule Take 1 capsule (5 mg) by mouth once daily at bedtime. For nightmares 90 capsule 3    QUEtiapine (SEROquel) 200 mg tablet Take 1 tablet (200 mg) by mouth once daily at bedtime. 90 tablet 3    risperiDONE (RisperDAL) 1 mg tablet TAKE 1 TABLET BY MOUTH EVERY MORNING AND TAKE 1 TABLET BY MOUTH EVERY EVENING. 180 tablet 3    rosuvastatin (Crestor) 5 mg tablet TAKE 1 TABLET(5 MG) BY MOUTH DAILY 90 tablet 1    tiZANidine (Zanaflex) 2 mg tablet Take 1 tablet (2 mg) by mouth 3 times a day.      tranexamic acid (Lysteda) 650 mg tablet tablet Take 2 tablets (1,300 mg) by mouth 3 times a day. 30 tablet 3     No current facility-administered medications for this visit.        OBJECTIVE    PHYSICAL EXAM      5/29/2024     9:10 AM 7/10/2024     2:54 PM 7/23/2024     2:42 PM 8/5/2024     1:46 PM 8/14/2024     2:33 PM 8/21/2024     1:53 PM 9/4/2024    11:44 AM   Vitals   Systolic 129 121   113 110    Diastolic 64 88   76 70    Heart Rate 72 95   " 102     Temp 36.4 °C (97.5 °F) 36 °C (96.8 °F)        Resp 16 13        Height (in)  1.6 m (5' 3\")  1.6 m (5' 3\") 1.6 m (5' 3\")  1.6 m (5' 3\")   Weight (lb)  138 138 138  138 138   BMI  24.45 kg/m2 24.45 kg/m2 24.45 kg/m2 24.45 kg/m2 24.45 kg/m2 24.45 kg/m2   BSA (m2)  1.67 m2 1.67 m2 1.67 m2 1.67 m2 1.67 m2 1.67 m2   Visit Report  Report  Report Report Report Report      Body mass index is 24.45 kg/m².    GENERAL: A/Ox3, NAD. Appears healthy, well nourished  PSYCHIATRIC: Mood stable, appropriate memory recall  EYES: EOM intact, no scleral icterus  CARDIOVASCULAR: Palpable peripheral pulses  LUNGS: Breathing non-labored on room air  SKIN: no erythema, rashes, or ecchymoses     MUSCULOSKELETAL:  Laterality: left Knee Exam  - Skin intact  - No erythema or warmth  - No ecchymosis or soft tissue swelling  - Alignment: neutral  - Palpation: Positive TTP medial and lateral patellar facets  - ROM: 0-0-120, patella mobility 1+ medial and lateral with mild crepitus, no apprehension  - Effusion: trace  - Strength: knee extension and flexion 5/5, EHL/PF/DF motor intact  - Stability:        Anterior drawer stable       Posterior drawer stable       Varus/valgus stable       negative Lachman  -Equivocal Hilda's  - Gait: trace antalgic  - Hip Exam: flexion to 100+ degrees, full extension, internal/external rotation adequate, and no pain with log roll    NEUROVASCULAR:  - Neurovascular Status: sensation intact to light touch distally, lower extremity motor intact  - Capillary refill brisk at extremities, Bilateral dorsalis pedis pulse 2+    Imaging: MRI of the left knee reviewed by me demonstrates chondromalacia patella with some mild degenerative change, medial lateral meniscus intact, ligamentous structures intact.  Images were personally reviewed and interpreted by me.  Radiology reports were reviewed by me as well, if readily available at the time.        Tr Dowling MD  Attending Surgeon    Sports " Medicine Orthopaedic Surgery  [facility] [place]  [facility] [employer]

## 2024-09-06 ENCOUNTER — APPOINTMENT (OUTPATIENT)
Dept: UROLOGY | Facility: CLINIC | Age: 54
End: 2024-09-06
Payer: MEDICARE

## 2024-09-17 ENCOUNTER — APPOINTMENT (OUTPATIENT)
Dept: UROLOGY | Facility: CLINIC | Age: 54
End: 2024-09-17
Payer: MEDICARE

## 2024-09-23 ENCOUNTER — TELEPHONE (OUTPATIENT)
Dept: ENDOCRINOLOGY | Facility: CLINIC | Age: 54
End: 2024-09-23

## 2024-09-23 ENCOUNTER — APPOINTMENT (OUTPATIENT)
Dept: ENDOCRINOLOGY | Facility: CLINIC | Age: 54
End: 2024-09-23
Payer: MEDICARE

## 2024-09-23 NOTE — TELEPHONE ENCOUNTER
Called and left message letting Hasna know that her NP appt has been cancelled due to provider being out.

## 2024-09-25 ENCOUNTER — TELEPHONE (OUTPATIENT)
Dept: ENDOCRINOLOGY | Facility: CLINIC | Age: 54
End: 2024-09-25
Payer: MEDICARE

## 2024-10-04 ENCOUNTER — APPOINTMENT (OUTPATIENT)
Dept: UROLOGY | Facility: CLINIC | Age: 54
End: 2024-10-04
Payer: MEDICARE

## 2024-10-14 ENCOUNTER — APPOINTMENT (OUTPATIENT)
Dept: BEHAVIORAL HEALTH | Facility: CLINIC | Age: 54
End: 2024-10-14
Payer: MEDICARE

## 2024-11-12 ENCOUNTER — TELEPHONE (OUTPATIENT)
Dept: OBSTETRICS AND GYNECOLOGY | Facility: CLINIC | Age: 54
End: 2024-11-12
Payer: MEDICARE

## 2024-11-12 DIAGNOSIS — A60.9 HSV (HERPES SIMPLEX VIRUS) ANOGENITAL INFECTION: Primary | ICD-10-CM

## 2024-11-12 RX ORDER — VALACYCLOVIR HYDROCHLORIDE 500 MG/1
500 TABLET, FILM COATED ORAL DAILY
Qty: 90 TABLET | Refills: 3 | Status: SHIPPED | OUTPATIENT
Start: 2024-11-12 | End: 2025-05-11

## 2024-11-12 NOTE — TELEPHONE ENCOUNTER
Patient informed she was positive for type 2 HSV.  Also informed patient she should have refills at the pharmacy for her current outbreak.  She would like new prescription sent so she can take daily for suppression once she is done with the 5 day dose. Pharmacy verified.

## 2024-11-12 NOTE — TELEPHONE ENCOUNTER
Patient requesting medication to suppress herpes  Has current vaginal herpes outbreak  Kay Isabel  Patient would also like to know what type of herpes she has... has heard there are different types

## 2024-11-22 DIAGNOSIS — F43.10 PTSD (POST-TRAUMATIC STRESS DISORDER): ICD-10-CM

## 2024-11-22 RX ORDER — CLONAZEPAM 1 MG/1
1 TABLET ORAL 2 TIMES DAILY
Qty: 60 TABLET | Refills: 1 | Status: SHIPPED | OUTPATIENT
Start: 2024-11-22 | End: 2025-01-21

## 2024-11-22 RX ORDER — PRAZOSIN HYDROCHLORIDE 5 MG/1
5 CAPSULE ORAL NIGHTLY
Qty: 30 CAPSULE | Refills: 1 | Status: SHIPPED | OUTPATIENT
Start: 2024-11-22 | End: 2025-01-21

## 2024-11-22 RX ORDER — RISPERIDONE 1 MG/1
1 TABLET ORAL 2 TIMES DAILY
Qty: 60 TABLET | Refills: 1 | Status: SHIPPED | OUTPATIENT
Start: 2024-11-22 | End: 2025-01-21

## 2024-11-22 RX ORDER — QUETIAPINE FUMARATE 200 MG/1
200 TABLET, FILM COATED ORAL NIGHTLY
Qty: 30 TABLET | Refills: 1 | Status: SHIPPED | OUTPATIENT
Start: 2024-11-22 | End: 2025-01-21

## 2024-11-29 ENCOUNTER — APPOINTMENT (OUTPATIENT)
Facility: HOSPITAL | Age: 54
End: 2024-11-29
Payer: MEDICARE

## 2024-12-05 DIAGNOSIS — F43.10 PTSD (POST-TRAUMATIC STRESS DISORDER): ICD-10-CM

## 2024-12-05 RX ORDER — ESCITALOPRAM OXALATE 20 MG/1
20 TABLET ORAL DAILY
Qty: 90 TABLET | Refills: 1 | Status: SHIPPED | OUTPATIENT
Start: 2024-12-05 | End: 2025-06-03

## 2024-12-06 ENCOUNTER — OFFICE VISIT (OUTPATIENT)
Facility: HOSPITAL | Age: 54
End: 2024-12-06
Payer: MEDICARE

## 2024-12-06 VITALS — DIASTOLIC BLOOD PRESSURE: 80 MMHG | SYSTOLIC BLOOD PRESSURE: 113 MMHG

## 2024-12-06 DIAGNOSIS — N95.8 GENITOURINARY SYNDROME OF MENOPAUSE: ICD-10-CM

## 2024-12-06 DIAGNOSIS — N39.3 SUI (STRESS URINARY INCONTINENCE, FEMALE): ICD-10-CM

## 2024-12-06 DIAGNOSIS — N32.81 OAB (OVERACTIVE BLADDER): Primary | ICD-10-CM

## 2024-12-06 PROCEDURE — G2211 COMPLEX E/M VISIT ADD ON: HCPCS | Performed by: STUDENT IN AN ORGANIZED HEALTH CARE EDUCATION/TRAINING PROGRAM

## 2024-12-06 PROCEDURE — 99215 OFFICE O/P EST HI 40 MIN: CPT | Mod: 57 | Performed by: STUDENT IN AN ORGANIZED HEALTH CARE EDUCATION/TRAINING PROGRAM

## 2024-12-06 PROCEDURE — 99215 OFFICE O/P EST HI 40 MIN: CPT | Performed by: STUDENT IN AN ORGANIZED HEALTH CARE EDUCATION/TRAINING PROGRAM

## 2024-12-06 RX ORDER — ACETAMINOPHEN 325 MG/1
975 TABLET ORAL ONCE
OUTPATIENT
Start: 2024-12-06 | End: 2024-12-06

## 2024-12-06 RX ORDER — CEFAZOLIN SODIUM 2 G/100ML
2 INJECTION, SOLUTION INTRAVENOUS ONCE
OUTPATIENT
Start: 2024-12-06 | End: 2024-12-06

## 2024-12-06 RX ORDER — CELECOXIB 200 MG/1
200 CAPSULE ORAL ONCE
OUTPATIENT
Start: 2024-12-06 | End: 2024-12-06

## 2024-12-06 ASSESSMENT — ENCOUNTER SYMPTOMS
LOSS OF SENSATION IN FEET: 0
OCCASIONAL FEELINGS OF UNSTEADINESS: 0
DEPRESSION: 0

## 2024-12-06 ASSESSMENT — COLUMBIA-SUICIDE SEVERITY RATING SCALE - C-SSRS
1. IN THE PAST MONTH, HAVE YOU WISHED YOU WERE DEAD OR WISHED YOU COULD GO TO SLEEP AND NOT WAKE UP?: NO
2. HAVE YOU ACTUALLY HAD ANY THOUGHTS OF KILLING YOURSELF?: NO
6. HAVE YOU EVER DONE ANYTHING, STARTED TO DO ANYTHING, OR PREPARED TO DO ANYTHING TO END YOUR LIFE?: NO

## 2024-12-06 NOTE — PROGRESS NOTES
HISTORY OF PRESENT ILLNESS:  Suyapa Thao is a 54 y.o. female who presents today as a new patient. They were referred by [facility] for stress incontinence and mixed incontinence.  Patient is bothered by both equally, the  stress incontinence occurs with any physical activity and with intercourse.  She also has significant urgency and urge incontinence and nocturia.  She has previously tried oxybutynin and Myrbetriq neither 1 of these has been very helpful.  Her symptoms are adversely affecting her quality of life.  She currently smokes 6 to 8 cigarettes/day for the last 20 years.  She has a history of PTSD and has a history of serving in the .         Past Medical History  She has a past medical history of ADHD (attention deficit hyperactivity disorder), Adjustment disorder, Anxiety, Chronic pain disorder, Depression, Hallucination, Memory loss, Panic attack, Post-traumatic stress disorder, unspecified (08/02/2022), Psychiatric illness, PTSD (post-traumatic stress disorder), and Sleep difficulties.    Surgical History  She has a past surgical history that includes Other surgical history (03/29/2022) and Other surgical history (03/29/2022).     Social History  She reports that she has been smoking cigarettes. She has never used smokeless tobacco. She reports that she does not currently use alcohol. She reports that she does not use drugs.    Family History  Family History   Problem Relation Name Age of Onset    Hypertension Mother      Heart disease Father      Diabetes Father      Hypertension Father          Allergies  Patient has no known allergies.      A comprehensive 10+ review of systems was negative except for: see hpi                          PHYSICAL EXAMINATION:  BP Readings from Last 3 Encounters:   12/06/24 113/80   08/21/24 110/70   08/14/24 113/76      Wt Readings from Last 3 Encounters:   09/04/24 62.6 kg (138 lb)   08/21/24 62.6 kg (138 lb)   08/05/24 62.6 kg (138 lb)      BMI: Estimated body  "mass index is 24.45 kg/m² as calculated from the following:    Height as of 9/4/24: 1.6 m (5' 3\").    Weight as of 9/4/24: 62.6 kg (138 lb).  BSA: Estimated body surface area is 1.67 meters squared as calculated from the following:    Height as of 9/4/24: 1.6 m (5' 3\").    Weight as of 9/4/24: 62.6 kg (138 lb).  HEENT: Normocephalic, atraumatic, PER EOMI, nonicteric, trachea normal, thyroid normal, oropharynx normal.  CARDIAC: regular rate & rhythm, S1 & S2 normal.  No heaves, thrills, gallops or murmurs.  LUNGS: Clear to auscultation, no spinal or CV tenderness.  EXTREMITIES: No evidence of cyanosis, clubbing or edema.        Pelvic:  Chaperone for pelvic exam:   Genitourinary:  normal external genitalia, Bartholin's glands, Kemah's glands negative,   Urethra normal meatus, non-tender, no periurethral mass  Vaginal mucosa  normal  Cervix  normal  Uterus normal size, nontender  Adnexae  negative nontender, no masses  Atrophy negative    CST negative  Pelvic floor muscle contraction  4/5    POP-Q (in supine position):       Aa -3     Ba -3     C -8              gh 3     pb 3     tvl 8              Ap -3     Bp -3     D -8    Rectal: no hemorrhoids, fissures or masses.        Assessment:  54 y.o. female presents as a new patient with mixed incontinence      MICHAEL    -discussed mechanism of UUI and TEETEE, and treatment options for both including PFT, pessary, sling for TEETEE and PFT, pharmacotherapy and third-line therapy for OAB    She has already tried oxybutynin and Myrbetriq without improvement    We discussed botox vs sacral neuromodulation: both have similar efficacy 80% patients reports >50% improvement, botox associated with 5% risk of incomplete emptying, increase in UTI and will require re-injection in 6-9 months; and as early as 3 months. SNM is a staged procedure, 2 weeks apart, consisting first of lead implantation then internalization of IPG if there is improvement. Interstim is associated with lead migration, " explantation, infection and bleeding, though risks are all <5%. We also discussed PTNS which is associated with success rates comparable to medical therapy but without side-effects without significant major morbidity.    Bulkamid is associated with slightly less success rate of a sling about 60 to 70% of women having >90% improvement with 30% requiring a second injection at a median of 9 months. At 7 years up to 30-40% of people may need a second anti-incontinence procedure. Main AE is urinary retention which resolves within 24 hours of using a 10-12 Nigerien catheter. Additional AE include UTI, dysuria, urinary frequency and nocturia.  I discussed that if she still has some leakage after her procedure she we could perform another injection within 4 weeks.    I discussed the sling procedure in depth with her there is a long term success rate of 70-80% complete continence and up to 90% siginficiant improvement up to 10 years after surgery, though the sling is meant to last a lifetime, there is a <5% risk of subsequent surgery, either revision or excision within 9 years. The major complications include bladder perforation with sling placement <1%, retention requiring sling lysis 1-3%, transient retention requiring 2-3 days of catheter drainage 33%, and mesh erosion 1-3%.    Will plan on UDS, we will tentatively schedule for sling and we can consider doing Botox for her in the office    If we go to the OR patient will need sedation prior due to history of PTSD and flashbacks    GUSM:  Continue Vagifem      All questions and concerns were answered and addressed.  The patient expressed understanding and agrees with the plan.     Jorje Nolasco MD    Scribe Attestation  By signing my name below, ISavannah Scribe   attest that this documentation has been prepared under the direction and in the presence of Jorje Nolasco MD.

## 2024-12-09 RX ORDER — PRAZOSIN HYDROCHLORIDE 5 MG/1
5 CAPSULE ORAL NIGHTLY
Qty: 30 CAPSULE | Refills: 1 | OUTPATIENT
Start: 2024-12-09 | End: 2025-02-07

## 2024-12-10 ENCOUNTER — APPOINTMENT (OUTPATIENT)
Dept: PAIN MEDICINE | Facility: CLINIC | Age: 54
End: 2024-12-10
Payer: MEDICARE

## 2024-12-10 VITALS — DIASTOLIC BLOOD PRESSURE: 70 MMHG | WEIGHT: 140 LBS | SYSTOLIC BLOOD PRESSURE: 120 MMHG | BODY MASS INDEX: 24.8 KG/M2

## 2024-12-10 DIAGNOSIS — M46.1 BILATERAL SACROILIITIS (CMS-HCC): ICD-10-CM

## 2024-12-10 DIAGNOSIS — M47.812 CERVICAL SPONDYLOSIS WITHOUT MYELOPATHY: Primary | ICD-10-CM

## 2024-12-10 DIAGNOSIS — M50.222 HERNIATED NUCLEUS PULPOSUS, C5-6: ICD-10-CM

## 2024-12-10 RX ORDER — TRAMADOL HYDROCHLORIDE 50 MG/1
50 TABLET ORAL EVERY 12 HOURS PRN
Qty: 56 TABLET | Refills: 0 | Status: SHIPPED | OUTPATIENT
Start: 2024-12-10 | End: 2025-01-07

## 2024-12-10 ASSESSMENT — ENCOUNTER SYMPTOMS
OCCASIONAL FEELINGS OF UNSTEADINESS: 0
LOSS OF SENSATION IN FEET: 0
DEPRESSION: 0

## 2024-12-10 ASSESSMENT — ANXIETY QUESTIONNAIRES
2. NOT BEING ABLE TO STOP OR CONTROL WORRYING: NOT AT ALL
5. BEING SO RESTLESS THAT IT IS HARD TO SIT STILL: NOT AT ALL
1. FEELING NERVOUS, ANXIOUS, OR ON EDGE: NOT AT ALL
3. WORRYING TOO MUCH ABOUT DIFFERENT THINGS: NOT AT ALL
6. BECOMING EASILY ANNOYED OR IRRITABLE: NOT AT ALL
GAD7 TOTAL SCORE: 0
4. TROUBLE RELAXING: NOT AT ALL
7. FEELING AFRAID AS IF SOMETHING AWFUL MIGHT HAPPEN: NOT AT ALL
IF YOU CHECKED OFF ANY PROBLEMS ON THIS QUESTIONNAIRE, HOW DIFFICULT HAVE THESE PROBLEMS MADE IT FOR YOU TO DO YOUR WORK, TAKE CARE OF THINGS AT HOME, OR GET ALONG WITH OTHER PEOPLE: NOT DIFFICULT AT ALL

## 2024-12-10 ASSESSMENT — PATIENT HEALTH QUESTIONNAIRE - PHQ9
2. FEELING DOWN, DEPRESSED OR HOPELESS: NOT AT ALL
SUM OF ALL RESPONSES TO PHQ9 QUESTIONS 1 AND 2: 0
1. LITTLE INTEREST OR PLEASURE IN DOING THINGS: NOT AT ALL

## 2024-12-10 ASSESSMENT — PAIN SCALES - GENERAL: PAINLEVEL_OUTOF10: 5 - MODERATE PAIN

## 2024-12-10 ASSESSMENT — PAIN DESCRIPTION - DESCRIPTORS: DESCRIPTORS: DISCOMFORT;RADIATING

## 2024-12-10 ASSESSMENT — PAIN - FUNCTIONAL ASSESSMENT: PAIN_FUNCTIONAL_ASSESSMENT: 0-10

## 2024-12-10 NOTE — PROGRESS NOTES
"  Allowed conditions       · Other cervical disc displacement at C5-C6 level (722.0) (M50.222)   · Right cervical radiculopathy (723.4) (M54.12)   · Cervical spondylosis without myelopathy (721.0) (M47.812)   . Bilateral sacroiliitis        Chief complaint  Neck and back pain     History  Suyapa Thao is back for pain management office visit  Pain in the back from SIJ is coming back.   Back pain is affecting her and the neck but the back pain is worse  That is over the SIJ bilaterally  Pain worse with standing and walking  No radicular pain to the lower limbs    Also neck pain worse with reaching and standing and walking    The SIJ are helping for 6 months. Last injection was in may  She uses tramadol rarely like 30 tabs lasted for 6 months        Pain level without medication is 8/10 , wantedthe injection to control the pain     The pain meds are helping control the pain and improving Activities of Daily living and quality of life and quality of sleep.    opioids treatment agreement April 2024  Using tramadol on rare occasions  Oarrs pulled and reviewed, no concerns  last urine toxicology testing earlier this year and it was compliant we will repeat  Xray updated spine   ORT Score is  0  Pain pathology and pain generators spine   Modalities tried injection, surgery, physical therapy, TENS unit, nonsteroidal anti-inflammatory medication       Review of Systems :  Denied any fever or chills. No weight loss and no night sweats. No cough or sputum production. No diarrhea   The constipation has been responding to fibers and over the counter medications.     No bladder and bowel incontinence and no other changes in bladder and bowel. No skin changes.  Reports tiredness and fatigability only if the pain is not controlled.     Denied opioids diversion and abuse and denies alcoholism. Denies overuse of the pain medications.  No reported euphoria sensation or getting a \"high\" on the pain medications.    The control of the " pain with the pain medications is helping the control of the symptoms and allowing the function and activities of daily living, enjoyment of life, improving the quality of life and sleep with less interruption by the pain. The goal is symptomatic control of the nonmalignant chronic pain and not to repair the permanent damage in the tissues inducing the chronic pain conditions. We are aiming to shift the focus from the nonmalignant chronic pain to other aspects of life by symptomatically treating this chronic pain. If this pain is not treated it will lead to major morbidity and it is also associated with increased risks of mortality. The patient understands those very clearly and also understand high risks of morbidity and mortality if not strictly adherent to the treatment recommendations and reporting any associated side effects. Also patient understand the full responsibility associated with these medications to avoid abuse or overuse or any use of these medications for anything besides treating the patient's own chronic pain and nothing else under any circumstances.        Physical examination  Awake, alert and oriented for time place and persons   My nurse  Deepti BLAS  was present during the entire history and physical examination      Examination of the lumbar spine and the SIJ area showed mild reversal of the lumbar lordosis with slight scoliosis with right-sided concavity.  The scoliosis slightly corrected upon bending of the lumbar spine.  Tight muscle bands over the right  lower lumbar area and over the upper buttock area.  Gaenslen and Dexter are positive on the right side. Also compression test of the right SIJ is positive increasing the pain.   Sahni test negative with no pain upon provocative testing of the lower facet joints  Straight leg raising was negative on both sides.  No sensorimotor deficits in the lower limbs.  Gume testing were negative for axial loading and log rotation.       Similar on  the left side    Diagnosis  Problem List Items Addressed This Visit       Cervical spondylosis without myelopathy - Primary    Relevant Medications    traMADol (Ultram) 50 mg tablet    Other Relevant Orders    Referral to Physical Therapy    Herniated nucleus pulposus, C5-6    Relevant Medications    traMADol (Ultram) 50 mg tablet    Other Relevant Orders    Referral to Physical Therapy    Bilateral sacroiliitis (CMS-HCC)    Relevant Medications    traMADol (Ultram) 50 mg tablet    Other Relevant Orders    Referral to Physical Therapy    FL pain management    Sacroiliac Joint Injection        Plan  Reviewed the pain generators.  Went over the types of pain with neuropathic and nociceptive and different pathologies and therapeutic modalities. Discussed the mechanism of action of interventions from acupuncture, physical therapy , regular exercises, injections, botox, spinal cord stimulation, and role of surgery     Went over pathology of the intervertebral disc displacement and the anatomical relation to the Nerve roots and relation to the radicular symptoms. Went over treatment modalities with conservative treatment including acupuncture   and epidural steroid injection with fluoroscopy guidance and last resort of surgery    Based on the above findings and the clinical response to the opioids medications and improvement of the activities of daily living, sleep, and work performance. We made this complex decision to continue the opioids therapy in light of the evidence of the patient's responsibility in using the pain medications as prescribed for the nonmalignant chronic pain condition. We discussed about the use of the pain medications to treat the symptoms of chronic nonmalignant pain and we are not trying the repair the permanent damage in the tissues, rather we are trying to control the symptoms induced by the permanent damage to the tissues inducing the chronic pain condition and resulting disability. I  explained the difference and discussed it with the patient and stressed the importance of knowing the difference especially because of the potential side effects and the potential addicting effect and habit forming nature of the dangerous drugs we are using to treat the symptoms of the chronic pain.      We discussed that we are prescribing the medications on good paula and legitimate medical reason.     We reviewed the side effects and precautions of opioids prescriptions as discussed in the opioids treatment agreement.    realizes the interaction between the therapeutic classes including the respiratory depression and potential death     Random drug testing   we will submit     Consider SIJ bilateral   PT   Tramadol for pain   HEP    Discussed about NSAIDS and I explained about the opioids sparing effect to allow keeping the opioids dose at minimal effective dose.   I went over the potential side effects of the NSAIDS on the gastrointestinal, renal and cardiovascular systems.      I detailed the side effects from the acetaminophen in the medication and made aware of those. I also explained about the cumulative effects on the organs and mainly the liver.     Given the opioids therapy , we discussed about the risk for accidental over dose on the pain medications, either for patient or other household. I went over the mechanism of action and mode of use of the Naloxone according to the  recommendations. I will provide a prescription for a kit.     Follow-up 8 weeks or earlier if needed     The level of clinical decision making in this office visit,  is high, given the high risks of complications with the morbidity and mortality due to the fact that acute and chronic pain may pose a threat to life and bodily function, if under treated, poorly treated, or with failure to maintain adequate treatment and timely medical follow up. Additionally over treatment has its own set of complications including  overdosing on the pain medications and also the habit forming potentials with the use of the medications used to treat chronic painful conditions including therapeutic classes classified as dangerous medications. Given the serious and fluctuating nature of pain level and instensity with extensive consideration for whenever pain changes, there is always the risk of prolonged functional impairment requiring close patient monitoring with regular assessments and reassessments and high level medical decision making at every office visit. The amount and complexity of data reviewed is high given the patient clinical presentation, labs,  data, radiology reports, and other tests as discussed during office visits. Pertinent data whether positive or negative were taken in consideration in the process of making this high level medical decision.

## 2024-12-17 DIAGNOSIS — F43.10 PTSD (POST-TRAUMATIC STRESS DISORDER): ICD-10-CM

## 2024-12-18 DIAGNOSIS — F43.10 PTSD (POST-TRAUMATIC STRESS DISORDER): ICD-10-CM

## 2024-12-18 RX ORDER — RISPERIDONE 1 MG/1
1 TABLET ORAL 2 TIMES DAILY
Qty: 60 TABLET | Refills: 0 | OUTPATIENT
Start: 2024-12-18

## 2024-12-18 RX ORDER — PRAZOSIN HYDROCHLORIDE 5 MG/1
5 CAPSULE ORAL NIGHTLY
Qty: 30 CAPSULE | Refills: 1 | Status: SHIPPED | OUTPATIENT
Start: 2024-12-18 | End: 2025-02-16

## 2024-12-18 RX ORDER — PRAZOSIN HYDROCHLORIDE 5 MG/1
5 CAPSULE ORAL NIGHTLY
Qty: 30 CAPSULE | Refills: 1 | Status: SHIPPED | OUTPATIENT
Start: 2024-12-18 | End: 2024-12-18

## 2024-12-18 RX ORDER — RISPERIDONE 1 MG/1
1 TABLET ORAL 2 TIMES DAILY
Qty: 60 TABLET | Refills: 0 | Status: SHIPPED | OUTPATIENT
Start: 2024-12-18 | End: 2025-01-17

## 2024-12-19 RX ORDER — PRAZOSIN HYDROCHLORIDE 5 MG/1
5 CAPSULE ORAL NIGHTLY
Qty: 30 CAPSULE | Refills: 1 | OUTPATIENT
Start: 2024-12-19 | End: 2025-02-17

## 2024-12-30 ENCOUNTER — APPOINTMENT (OUTPATIENT)
Dept: BEHAVIORAL HEALTH | Facility: CLINIC | Age: 54
End: 2024-12-30
Payer: MEDICARE

## 2025-01-01 DIAGNOSIS — F43.10 PTSD (POST-TRAUMATIC STRESS DISORDER): ICD-10-CM

## 2025-01-02 ENCOUNTER — APPOINTMENT (OUTPATIENT)
Dept: ENDOCRINOLOGY | Facility: CLINIC | Age: 55
End: 2025-01-02
Payer: MEDICARE

## 2025-01-02 RX ORDER — RISPERIDONE 1 MG/1
1 TABLET ORAL 2 TIMES DAILY
Qty: 180 TABLET | Refills: 0 | Status: SHIPPED | OUTPATIENT
Start: 2025-01-02

## 2025-01-06 DIAGNOSIS — M46.1 BILATERAL SACROILIITIS (CMS-HCC): ICD-10-CM

## 2025-01-06 DIAGNOSIS — M50.222 HERNIATED NUCLEUS PULPOSUS, C5-6: ICD-10-CM

## 2025-01-06 DIAGNOSIS — M47.812 CERVICAL SPONDYLOSIS WITHOUT MYELOPATHY: ICD-10-CM

## 2025-01-06 RX ORDER — TRAMADOL HYDROCHLORIDE 50 MG/1
50 TABLET ORAL EVERY 12 HOURS PRN
Qty: 30 TABLET | Refills: 0 | Status: SHIPPED | OUTPATIENT
Start: 2025-01-06 | End: 2025-02-03

## 2025-01-06 NOTE — PROGRESS NOTES
Sending tramadol pdmp checked on lorazepam and benzo. realizes the interaction between the therapeutic classes including the respiratory depression and potential death

## 2025-01-08 ENCOUNTER — TELEPHONE (OUTPATIENT)
Dept: OBSTETRICS AND GYNECOLOGY | Facility: CLINIC | Age: 55
End: 2025-01-08
Payer: MEDICARE

## 2025-01-09 DIAGNOSIS — N89.8 VAGINAL DRYNESS: ICD-10-CM

## 2025-01-09 RX ORDER — ESTRADIOL 10 UG/1
10 INSERT VAGINAL 2 TIMES WEEKLY
Qty: 24 TABLET | Refills: 3 | Status: SHIPPED | OUTPATIENT
Start: 2025-01-09

## 2025-01-13 ENCOUNTER — APPOINTMENT (OUTPATIENT)
Dept: BEHAVIORAL HEALTH | Facility: CLINIC | Age: 55
End: 2025-01-13
Payer: MEDICARE

## 2025-01-13 DIAGNOSIS — F43.10 PTSD (POST-TRAUMATIC STRESS DISORDER): ICD-10-CM

## 2025-01-13 PROCEDURE — 99215 OFFICE O/P EST HI 40 MIN: CPT | Performed by: PSYCHIATRY & NEUROLOGY

## 2025-01-13 RX ORDER — PRAZOSIN HYDROCHLORIDE 5 MG/1
CAPSULE ORAL
Qty: 30 CAPSULE | Refills: 1 | OUTPATIENT
Start: 2025-01-13

## 2025-01-13 RX ORDER — RISPERIDONE 2 MG/1
3 TABLET ORAL NIGHTLY
Qty: 90 TABLET | Refills: 1 | Status: SHIPPED | OUTPATIENT
Start: 2025-01-13 | End: 2026-01-13

## 2025-01-13 RX ORDER — RISPERIDONE 2 MG/1
TABLET ORAL
Qty: 90 TABLET | Refills: 1 | OUTPATIENT
Start: 2025-01-13

## 2025-01-13 RX ORDER — PRAZOSIN HYDROCHLORIDE 5 MG/1
5 CAPSULE ORAL 2 TIMES DAILY
Qty: 30 CAPSULE | Refills: 1 | Status: SHIPPED | OUTPATIENT
Start: 2025-01-13 | End: 2025-03-14

## 2025-01-13 NOTE — PROGRESS NOTES
Psychiatry Initial Intake    Name: Suyapa COLLINS: 1970  MRN: [id number]    Date: 25     Chief complaint:  I need a new psychiatrist (a former patient of Dr. Griffin)     HPI: She said she had gone to war zones as an  over the years. She was diagnosed with PTSD and continued having nightmares even before new wars in the [place], which made her PTSD worse. Had problem of falling asleep and staying asleep. She felt panicky on and off.  Felt startle easily. He worried too much abut his son and many things. Did not enjoyed things as much as before. Did not want to talk to her family. Still felt agitated easily. She felt safe at home.   She said she felt sad sometime, but not sure if she felt depressed. Less interested in doing things. Appetite - decreased, but weight was ok. Energy was not good. Concentration was not good either. Also felt hopeless and helpless some time. Denied having SI/HI/AVH or paranoia.  She said she smelled blood when she was in dissociative state. Also felt irritable often with yelling and cursing, but denied having physical violence.   Currently taking Lexapro 20 mg/da  Klonopin 1 mg per day  Risperdal 1 mg per day   Quetiapine 200 mg per day  Prazosin 5 mg per day   She felt medication was helpful   Had lightheaded and dry mouth, no other side effect.     PPHx: no previous hospitalization, SA, or violence toward others. 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 She said she felt sad because she saw so many people were killed in 2018. Denied having manic/hypomanic sx.     PMHx: low back pain and neck pain-working with pain management  Had concussions with LOC when she was in [place]   Mild chronic COPD per record   no DM, heart disease, asthma, thyroid problem, or seizure   Has a paralyzed vocal cord. No problem with eyes, ears, nose or throat   Has SOB because she smoke cigarette. No chest pain. No HTN  No GI sx  Urine incontinence and will need bladder  surgery   No other neurological problem with exception of lightheaded periodically  No other problem with bones, joints, or muscles  Hx of amenia   No problem with skin     All; NKDA    Previous psych medications.  Tried one medication before, but did not remember the name.     Fhx: denied having mental illness  No fx of bipolar disorder  No fx of drug or alcohol problem  No fx of SA  Father - heart attack? DM  Paternal grandparents- DM    Family History:  Family History   Problem Relation Name Age of Onset    Hypertension Mother      Heart disease Father      Diabetes Father      Hypertension Father         Social History:  Born and raised in [place]. Came to the [place] in [place] when she was in  (18 yo); has two older brothers. She had a good childhood and had to come the [facility] because she and her family feared [place] government at that time. No hx of abuse. College education. She struggled in  because she did not know much English at that time. Had friends in ;  twice and  twice. Each relationship lasted 6 years. Has a child of 30 yo. Currently, living she lives  alone. Has been on disability for 3-4 years. She was a government contractor for 7 years. Mother and sister can be helpful.     LegHx: denied    CHIRAG;   Only drank a beer once a week   Denied trying any drug   Smoked 1/2 ppd for about 36 years    Psychiatric Review Of Systems:  Defer    MSE  Appearance: well-groomed.   Demeanor: average.   Eye Contact: average.   Motor Activity: average.   Speech: clear.   Language: Neurologic language is intact.   Fund of Knowledge: intact fund of knowledge.   Delusions: None Reported.   Hallucinations: not reported  Self Harm: None Reported.   Aggressive: None Reported.   Mood: depressed and anxious.   Affect: restricted most of the time  Orientation: alert, oriented x3.   Manner: cooperative.   Thought process: goal-directed.   Thought association: displays rational thought process.   Content of thought:  normal  Abstract/ Rational Thought: intact   Memory: grossly intact.   Behavior: normal motor activity, relaxed, good eye contact, calm.   Attention/Concentration: normal.   Cognition: intact.   Intelligence Estimate: average.   Executive Function: intact.   Insight: intact.   Judgement: intact.   Musculoskeletal: normal strength and tone. No abnormal movement   Impression:   PTSD - severe   MDD - moderate to low severe, chronic   Nicotine dependence  Discussion/Plan:    The diagnosis  and treatment plan were discussed with her. She fully understood.   She agreed to make another 60 min appointment to complete the initial evaluation.   Meanwhile, she agreed to increase prazosin to 5 mg bid and Risperdal 3 mg per day for controlling her PTSD sx.  She agreed to continue other medications from.  She was informed that she may benefit from ketamine infusion and intranasal esketamine and we can discuss the pros and cons at next appoint.   Will follow-up in a month.  Wesley Dunn MD.

## 2025-01-14 ENCOUNTER — APPOINTMENT (OUTPATIENT)
Dept: BEHAVIORAL HEALTH | Facility: CLINIC | Age: 55
End: 2025-01-14
Payer: MEDICARE

## 2025-01-21 ENCOUNTER — PROCEDURE VISIT (OUTPATIENT)
Facility: HOSPITAL | Age: 55
End: 2025-01-21
Payer: MEDICARE

## 2025-01-21 VITALS — SYSTOLIC BLOOD PRESSURE: 116 MMHG | DIASTOLIC BLOOD PRESSURE: 72 MMHG

## 2025-01-21 DIAGNOSIS — N32.81 OAB (OVERACTIVE BLADDER): ICD-10-CM

## 2025-01-21 PROCEDURE — 2500000004 HC RX 250 GENERAL PHARMACY W/ HCPCS (ALT 636 FOR OP/ED): Mod: JZ | Performed by: STUDENT IN AN ORGANIZED HEALTH CARE EDUCATION/TRAINING PROGRAM

## 2025-01-21 PROCEDURE — 52287 CYSTOSCOPY CHEMODENERVATION: CPT | Performed by: STUDENT IN AN ORGANIZED HEALTH CARE EDUCATION/TRAINING PROGRAM

## 2025-01-21 RX ORDER — SODIUM BICARBONATE 1 MEQ/ML
10 SYRINGE (ML) INTRAVENOUS ONCE
Status: COMPLETED | OUTPATIENT
Start: 2025-01-21 | End: 2025-01-21

## 2025-01-21 RX ORDER — NITROFURANTOIN 25; 75 MG/1; MG/1
100 CAPSULE ORAL 2 TIMES DAILY
Qty: 6 CAPSULE | Refills: 0 | Status: SHIPPED | OUTPATIENT
Start: 2025-01-21 | End: 2025-01-24

## 2025-01-21 RX ORDER — LIDOCAINE HYDROCHLORIDE 10 MG/ML
50 INJECTION, SOLUTION INFILTRATION; PERINEURAL ONCE
Status: COMPLETED | OUTPATIENT
Start: 2025-01-21 | End: 2025-01-21

## 2025-01-21 RX ADMIN — ONABOTULINUMTOXINA 100 UNITS: 100 INJECTION, POWDER, LYOPHILIZED, FOR SOLUTION INTRADERMAL; INTRAMUSCULAR at 13:55

## 2025-01-21 RX ADMIN — SODIUM BICARBONATE 10 MEQ: 84 INJECTION, SOLUTION INTRAVENOUS at 13:52

## 2025-01-21 RX ADMIN — LIDOCAINE HYDROCHLORIDE 50 ML: 10 INJECTION, SOLUTION INFILTRATION; PERINEURAL at 13:52

## 2025-01-21 ASSESSMENT — ENCOUNTER SYMPTOMS
OCCASIONAL FEELINGS OF UNSTEADINESS: 0
LOSS OF SENSATION IN FEET: 0
DEPRESSION: 0

## 2025-01-21 NOTE — PROGRESS NOTES
"HISTORY OF PRESENT ILLNESS:  Suyapa Thao is a 54 y.o. female who presents today for a botox injection.          Past Medical History  She has a past medical history of ADHD (attention deficit hyperactivity disorder), Adjustment disorder, Anxiety, Chronic pain disorder, Depression, Hallucination, Memory loss, Panic attack, Post-traumatic stress disorder, unspecified (08/02/2022), Psychiatric illness, PTSD (post-traumatic stress disorder), and Sleep difficulties.    Surgical History  She has a past surgical history that includes Other surgical history (03/29/2022) and Other surgical history (03/29/2022).     Social History  She reports that she has been smoking cigarettes. She has never used smokeless tobacco. She reports that she does not currently use alcohol. She reports that she does not use drugs.    Family History  Family History   Problem Relation Name Age of Onset    Hypertension Mother      Heart disease Father      Diabetes Father      Hypertension Father          Allergies  Patient has no known allergies.      A comprehensive 10+ review of systems was negative except for: see hpi                          PHYSICAL EXAMINATION:  BP Readings from Last 3 Encounters:   01/21/25 116/72   12/10/24 120/70   12/06/24 113/80      Wt Readings from Last 3 Encounters:   12/10/24 63.5 kg (140 lb)   09/04/24 62.6 kg (138 lb)   08/21/24 62.6 kg (138 lb)      BMI:   Estimated body mass index is 24.8 kg/m² as calculated from the following:    Height as of 9/4/24: 1.6 m (5' 3\").    Weight as of 12/10/24: 63.5 kg (140 lb).  BSA:   Estimated body surface area is 1.68 meters squared as calculated from the following:    Height as of 9/4/24: 1.6 m (5' 3\").    Weight as of 12/10/24: 63.5 kg (140 lb).  HEENT: Normocephalic, atraumatic, PER EOMI, nonicteric, trachea normal, thyroid normal, oropharynx normal.  CARDIAC: regular rate & rhythm, S1 & S2 normal.  No heaves, thrills, gallops or murmurs.  LUNGS: Clear to auscultation, no " spinal or CV tenderness.  EXTREMITIES: No evidence of cyanosis, clubbing or edema.       Botox Injection    Suyapa came today for Botox injection.  I reviewed with her the risks and benefits including ptosis, infection, and bleeding. She has no contraindications. She is on no antibiotics and has no neuromuscular disorders.  Pregnancy is not an issue.     She tolerated the procedure well.  The patient  will return to see me again in three to four months, earlier if there are any problems.     Suyapa understands the side effects and risks, as well as the necessity for continued treatment to maintain improvement.  Additional therapy may be necessary. Call if there are any problems. See diagram for injection sites and dosages. 100 units were used at a concentration of 10 units per 0.1 mL.         Assessment:  54 y.o. female presents as a new patient with mixed incontinence        MICHAEL  -discussed mechanism of UUI and TEETEE, and treatment options for both including PFT, pessary, sling for TEETEE and PFT, pharmacotherapy and third-line therapy for OAB  -She has already tried oxybutynin and Myrbetriq without improvement  -Will plan on UDS, we will tentatively schedule for sling and we can consider doing Botox for her in the office  -If we go to the OR patient will need sedation prior due to history of PTSD and flashbacks  -Rx abx   -S/P botox, 100 units, 1/21/24      GUSM:  Continue Vagifem      Follow up in 4 to 6 weeks       All questions and concerns were answered and addressed.  The patient expressed understanding and agrees with the plan.     Jorje Nolasco MD    Scribe Attestation  By signing my name below, I, Crista Diamond   attest that this documentation has been prepared under the direction and in the presence of Jorje Nolasco MD.

## 2025-01-29 DIAGNOSIS — F43.10 PTSD (POST-TRAUMATIC STRESS DISORDER): ICD-10-CM

## 2025-01-29 RX ORDER — PRAZOSIN HYDROCHLORIDE 5 MG/1
5 CAPSULE ORAL 2 TIMES DAILY
Qty: 30 CAPSULE | Refills: 1 | Status: SHIPPED | OUTPATIENT
Start: 2025-01-29 | End: 2025-03-30

## 2025-02-06 ENCOUNTER — APPOINTMENT (OUTPATIENT)
Dept: BEHAVIORAL HEALTH | Facility: CLINIC | Age: 55
End: 2025-02-06
Payer: MEDICARE

## 2025-02-06 DIAGNOSIS — F43.10 PTSD (POST-TRAUMATIC STRESS DISORDER): ICD-10-CM

## 2025-02-06 PROCEDURE — G2212 PROLONG OUTPT/OFFICE VIS: HCPCS | Performed by: PSYCHIATRY & NEUROLOGY

## 2025-02-06 PROCEDURE — 99215 OFFICE O/P EST HI 40 MIN: CPT | Performed by: PSYCHIATRY & NEUROLOGY

## 2025-02-06 RX ORDER — RISPERIDONE 2 MG/1
3 TABLET ORAL NIGHTLY
Qty: 135 TABLET | Refills: 3 | Status: SHIPPED | OUTPATIENT
Start: 2025-02-06 | End: 2026-02-06

## 2025-02-06 NOTE — PROGRESS NOTES
Follow-up visits  Started at 4:06 ended at 5:09     She is at home for this visit  Virtual or Telephone Consent    An interactive audio and video telecommunication system which permits real time communications between the patient (at the originating site) and provider (at the distant site) was utilized to provide this telehealth service.   Verbal consent was requested and obtained from Suyapa Thao on this date, 02/06/25 for a telehealth visit.      Subjective: She said she felt the same as last time. Still felt depressed daily all the time with severity of 7-10 of 10. Ten was the worst. Still enjoyed talking care of her pets, but she did not want to see people. Still did hopeless, hopeless and worthless. Still had nightmares and flashback and smelled blood. She said she will kill herself because of mom and her son. Still had problem of falling asleep and staying asleep.  Energy was not good. Concentration was not good. She could not cook anymore because she burned cooking gloves in the past when she was cooking.    No change medically.     Objective:   Appearance: well-groomed.   Demeanor: average, impatience when doing the QIDS-16-SR  Eye Contact: average.   Motor Activity: average.   Speech: clear.   Language: Neurologic language is intact.   Fund of Knowledge: intact fund of knowledge.   Delusions: None Reported.   Hallucinations: not reported  Self Harm: None Reported.   Aggressive: None Reported.   Mood: depressed and anxious.   Affect: restricted.   Orientation: alert, oriented x3.   Manner: cooperative.   Thought process: goal-directed.   Thought association: displays rational thought process.   Content of thought: normal  Abstract/ Rational Thought: intact   Memory: grossly intact.   Behavior: normal motor activity, relaxed, good eye contact, calm.   Attention/Concentration: normal.   Cognition: intact.   Intelligence Estimate: average.   Executive Function: intact.   Insight: intact.   Judgement: intact.    Musculoskeletal: normal strength and tone. No abnormal movement   Impression: major depressive disorder, severe (QIDS-16 score =24 today)  LOLI - moderate to severe   Social anxiety disorder   PTSD - chronic and severe   Discussion/Plan:    Options including intranasal esketamine were discussed with her. She fully understood. She said Dr. Griffin recommended intranasal esketamine before.   She said her mother looked over esketamine treatment and she wanted to try it.   Pros and cons from esketamine including potential cardiovascular complications, dissociation, sedation, psychosis or shanda were explained to her. She fully understood.   She said she and her  were working on pre-authorization from [employer].   She might benefit from esketamine. We will seek pre-authorization for intranasal esketamine.  She will receive intranasal esketamine treatment if her insurance company authorize it.   Follow-up in a month for regular follow-up visit.   Need come in in-person next time to sign the controlled substance agreement and do urine drug screen.   Wesley Dunn MD.

## 2025-02-07 ENCOUNTER — DOCUMENTATION (OUTPATIENT)
Dept: POSTOP/PACU | Facility: HOSPITAL | Age: 55
End: 2025-02-07
Payer: MEDICARE

## 2025-02-13 ENCOUNTER — TELEPHONE (OUTPATIENT)
Dept: BEHAVIORAL HEALTH | Facility: CLINIC | Age: 55
End: 2025-02-13
Payer: MEDICARE

## 2025-02-14 ENCOUNTER — TELEPHONE (OUTPATIENT)
Dept: BEHAVIORAL HEALTH | Facility: CLINIC | Age: 55
End: 2025-02-14
Payer: MEDICARE

## 2025-02-14 DIAGNOSIS — F43.10 PTSD (POST-TRAUMATIC STRESS DISORDER): ICD-10-CM

## 2025-02-14 RX ORDER — RISPERIDONE 2 MG/1
3 TABLET ORAL DAILY
Qty: 135 TABLET | Refills: 3 | Status: SHIPPED | OUTPATIENT
Start: 2025-02-14 | End: 2026-02-14

## 2025-02-18 ENCOUNTER — OFFICE VISIT (OUTPATIENT)
Facility: HOSPITAL | Age: 55
End: 2025-02-18
Payer: MEDICARE

## 2025-02-18 DIAGNOSIS — N39.3 SUI (STRESS URINARY INCONTINENCE, FEMALE): Primary | ICD-10-CM

## 2025-02-18 DIAGNOSIS — N32.81 OAB (OVERACTIVE BLADDER): ICD-10-CM

## 2025-02-18 PROCEDURE — 99214 OFFICE O/P EST MOD 30 MIN: CPT | Performed by: STUDENT IN AN ORGANIZED HEALTH CARE EDUCATION/TRAINING PROGRAM

## 2025-02-18 RX ORDER — TAMSULOSIN HYDROCHLORIDE 0.4 MG/1
CAPSULE ORAL
Qty: 10 CAPSULE | Refills: 0 | Status: SHIPPED | OUTPATIENT
Start: 2025-02-18

## 2025-02-18 NOTE — H&P (VIEW-ONLY)
"HISTORY OF PRESENT ILLNESS:  Suyapa Thao is a 54 y.o. female who presents today for a follow up visit. She states that she has no urge incontinence. She does have some stress incontinence still.  She does still wish to continue with surgery.          Past Medical History  She has a past medical history of ADHD (attention deficit hyperactivity disorder), Adjustment disorder, Anxiety, Chronic pain disorder, Depression, Hallucination, Memory loss, Panic attack, Post-traumatic stress disorder, unspecified (08/02/2022), Psychiatric illness, PTSD (post-traumatic stress disorder), and Sleep difficulties.    Surgical History  She has a past surgical history that includes Other surgical history (03/29/2022) and Other surgical history (03/29/2022).     Social History  She reports that she has been smoking cigarettes. She has never used smokeless tobacco. She reports that she does not currently use alcohol. She reports that she does not use drugs.    Family History  Family History   Problem Relation Name Age of Onset    Hypertension Mother      Heart disease Father      Diabetes Father      Hypertension Father          Allergies  Patient has no known allergies.      A comprehensive 10+ review of systems was negative except for: see hpi                          PHYSICAL EXAMINATION:  BP Readings from Last 3 Encounters:   01/21/25 116/72   12/10/24 120/70   12/06/24 113/80      Wt Readings from Last 3 Encounters:   12/10/24 63.5 kg (140 lb)   09/04/24 62.6 kg (138 lb)   08/21/24 62.6 kg (138 lb)      BMI: Estimated body mass index is 24.8 kg/m² as calculated from the following:    Height as of 9/4/24: 1.6 m (5' 3\").    Weight as of 12/10/24: 63.5 kg (140 lb).  BSA: Estimated body surface area is 1.68 meters squared as calculated from the following:    Height as of 9/4/24: 1.6 m (5' 3\").    Weight as of 12/10/24: 63.5 kg (140 lb).  HEENT: Normocephalic, atraumatic, PER EOMI, nonicteric, trachea normal, thyroid normal, oropharynx " normal.  CARDIAC: regular rate & rhythm, S1 & S2 normal.  No heaves, thrills, gallops or murmurs.  LUNGS: Clear to auscultation, no spinal or CV tenderness.  EXTREMITIES: No evidence of cyanosis, clubbing or edema.               Assessment:  54 y.o. female presents with mixed incontinence      MICHAEL  -discussed mechanism of UUI and TEETEE, and treatment options for both including PFT, pessary, sling for TEETEE and PFT, pharmacotherapy and third-line therapy for OAB  -She has already tried oxybutynin and Myrbetriq without improvement  -Will plan on UDS, we will tentatively schedule for sling and we can consider doing Botox for her in the office  -If we go to the OR patient will need sedation prior due to history of PTSD and flashbacks  -S/P botox, 100 units, 1/21/24, doing well     Still having TEETEE, wants to proceed with sling     -Rx flomax to take 3 days pre op and 7 days post op       GUSM:  -Continue Vagifem  -Rx estradiol       Follow up post op       All questions and concerns were answered and addressed.  The patient expressed understanding and agrees with the plan.     Jorje Nolasco MD    Scribe Attestation  By signing my name below, ISavannah, Scribe   attest that this documentation has been prepared under the direction and in the presence of Jorje Nolasco MD.

## 2025-02-21 ENCOUNTER — APPOINTMENT (OUTPATIENT)
Dept: OBSTETRICS AND GYNECOLOGY | Facility: CLINIC | Age: 55
End: 2025-02-21
Payer: MEDICARE

## 2025-02-21 VITALS — BODY MASS INDEX: 24.8 KG/M2 | SYSTOLIC BLOOD PRESSURE: 110 MMHG | WEIGHT: 140 LBS | DIASTOLIC BLOOD PRESSURE: 62 MMHG

## 2025-02-21 DIAGNOSIS — Z11.3 SCREEN FOR STD (SEXUALLY TRANSMITTED DISEASE): Primary | ICD-10-CM

## 2025-02-21 PROCEDURE — 99213 OFFICE O/P EST LOW 20 MIN: CPT | Performed by: NURSE PRACTITIONER

## 2025-02-21 NOTE — PROGRESS NOTES
Subjective   Suyapa Thao is a 54 y.o. who presents for sexually transmitted infection screening. Sexual history reviewed with the patient. STI exposures include none. Patient reports previous history of the following STIs: none. Current symptoms include none.    Has upcoming surgery with Dr Nolasco next week.        Social History     Substance and Sexual Activity   Sexual Activity Yes    Partners: Male    Birth control/protection: OCP     E-cigarette/Vaping       Questions Responses    E-cigarette/Vaping Use Former User          E-cigarette/Vaping Substances       Questions Responses    Nicotine No    THC No    CBD No    Flavoring No          E-cigarette/Vaping Devices       Questions Responses    Disposable No    Pre-filled or Refillable Cartridge No    Refillable Tank No    Pre-filled Pod No            Objective   Physical Exam  Constitutional:       Appearance: Normal appearance.   Pulmonary:      Effort: Pulmonary effort is normal.   Genitourinary:     General: Normal vulva.      Vagina: Normal.      Cervix: Normal. No cervical motion tenderness.      Uterus: Normal.       Adnexa: Right adnexa normal and left adnexa normal.   Neurological:      Mental Status: She is alert.   Psychiatric:         Behavior: Behavior normal.         Assessment/Plan   Diagnoses and all orders for this visit:  Screen for STD (sexually transmitted disease)  -     C. trachomatis / N. gonorrhoeae, Amplified, Urogenital  -     HIV 1/2 Antigen/Antibody Screen with Reflex to Confirmation; Future  -     Hepatitis B Surface Antigen; Future  -     Hepatitis C Antibody; Future  -     Syphilis Screen with Reflex; Future  -     Trichomonas vaginalis, Amplified.  Follow-up annually; sooner if needed.

## 2025-02-22 LAB
C TRACH RRNA SPEC QL NAA+PROBE: NOT DETECTED
C TRACH RRNA SPEC QL NAA+PROBE: NOT DETECTED
HBV SURFACE AG SERPL QL IA: NORMAL
HCV AB SERPL QL IA: NORMAL
HIV 1+2 AB+HIV1 P24 AG SERPL QL IA: NORMAL
N GONORRHOEA RRNA SPEC QL NAA+PROBE: NOT DETECTED
N GONORRHOEA RRNA SPEC QL NAA+PROBE: NOT DETECTED
QUEST GC CT AMPLIFIED (ALWAYS MESSAGE): NORMAL
QUEST GC CT AMPLIFIED (ALWAYS MESSAGE): NORMAL
T PALLIDUM AB SER QL IA: NORMAL
T VAGINALIS RRNA SPEC QL NAA+PROBE: NOT DETECTED
T VAGINALIS RRNA SPEC QL NAA+PROBE: NOT DETECTED

## 2025-02-25 LAB
C TRACH RRNA SPEC QL NAA+PROBE: NOT DETECTED
HBV SURFACE AG SERPL QL IA: NORMAL
HCV AB SERPL QL IA: NORMAL
HIV 1+2 AB+HIV1 P24 AG SERPL QL IA: NORMAL
N GONORRHOEA RRNA SPEC QL NAA+PROBE: NOT DETECTED
QUEST GC CT AMPLIFIED (ALWAYS MESSAGE): NORMAL
T PALLIDUM AB SER QL IA: NEGATIVE
T VAGINALIS RRNA SPEC QL NAA+PROBE: NOT DETECTED

## 2025-02-26 ENCOUNTER — ANESTHESIA EVENT (OUTPATIENT)
Dept: OPERATING ROOM | Facility: HOSPITAL | Age: 55
End: 2025-02-26
Payer: MEDICARE

## 2025-02-27 ENCOUNTER — CLINICAL SUPPORT (OUTPATIENT)
Dept: PREADMISSION TESTING | Facility: HOSPITAL | Age: 55
End: 2025-02-27
Payer: MEDICARE

## 2025-02-27 NOTE — PREPROCEDURE INSTRUCTIONS
Medication List            Accurate as of February 27, 2025 12:43 PM. Always use your most recent med list.                albuterol 90 mcg/actuation inhaler  INHALE 2 PUFFS BY MOUTH EVERY 4 HOURS AS NEEDED FOR WHEEZING OR SHORTNESS OF BREATH     clonazePAM 1 mg tablet  Commonly known as: KlonoPIN  Take 1 tablet (1 mg) by mouth 2 times a day.     escitalopram 20 mg tablet  Commonly known as: Lexapro  Take 1 tablet (20 mg) by mouth once daily.     estradiol 10 mcg tablet vaginal tablet  Commonly known as: Vagifem  Insert 1 tablet (10 mcg) into the vagina 2 times a week.     Incassia 0.35 mg tablet  Generic drug: norethindrone  TAKE 1 TABLET DAILY     Intrarosa 6.5 mg insert  Generic drug: prasterone (dhea)  Insert 6.5 mg into the vagina once daily.     Osphena 60 mg tablet  Generic drug: ospemifene     prazosin 5 mg capsule  Commonly known as: Minipress  Take 1 capsule (5 mg) by mouth 2 times a day. For nightmares     QUEtiapine 200 mg tablet  Commonly known as: SEROquel  Take 1 tablet (200 mg) by mouth once daily at bedtime.     risperiDONE 2 mg tablet  Commonly known as: RisperDAL  Take 1.5 tablets (3 mg) by mouth once daily. Take 1 tablet  (2 mg) at bedtime, and 1/2 tablet (1 mg) in the morning. Thanks.     rosuvastatin 5 mg tablet  Commonly known as: Crestor  TAKE 1 TABLET(5 MG) BY MOUTH DAILY     tamsulosin 0.4 mg 24 hr capsule  Commonly known as: Flomax  Take 3 days before surgery and 7 days after     valACYclovir 500 mg tablet  Commonly known as: Valtrex  Take 1 tablet (500 mg) by mouth once daily. Take twice daily for 5 days if an outbreak occurs.                      Pre Surgery Instructions:    Do not drink any liquid after midnight the night before your surgery  Do not eat any food after midnight the night before your surgery/procedure.  Candy, gum, mints and smoking of cigarettes, marijuana or vaping is not permitted after midnight prior to your surgery   Do not drink Alcohol 24 hours prior to surgery       Increase fluid intake day before surgery    Additional Instructions:      Review your medication instructions, take indicated medications    Wear  comfortable loose fitting clothing  Do not use moisturizers, creams, lotions or perfume  All jewelry and valuables should be left at home. May bring glasses and partials.    Stop any blood thinning medications as instructed by ordering physician or surgeon    Shower or bathe the night before or day of surgery.   Brush teeth and avoid perfumes, colognes, powders, makeup, aftershave and hair spray    Go to Registration, in the main lobby, upon arrival on the day of surgery and have 's license and medical insurance card available.    Call [phone] the day before your surgery/procedure to find out what time you are to arrive the next day.     Please have a responsible adult to drive you home and be available to help you as needed after surgery.   You cannot use public transportation or an insurance service for your ride home.

## 2025-02-28 ENCOUNTER — HOSPITAL ENCOUNTER (OUTPATIENT)
Facility: HOSPITAL | Age: 55
Setting detail: OUTPATIENT SURGERY
Discharge: HOME | End: 2025-02-28
Attending: STUDENT IN AN ORGANIZED HEALTH CARE EDUCATION/TRAINING PROGRAM | Admitting: STUDENT IN AN ORGANIZED HEALTH CARE EDUCATION/TRAINING PROGRAM
Payer: MEDICARE

## 2025-02-28 ENCOUNTER — PHARMACY VISIT (OUTPATIENT)
Dept: PHARMACY | Facility: CLINIC | Age: 55
End: 2025-02-28
Payer: COMMERCIAL

## 2025-02-28 ENCOUNTER — ANESTHESIA (OUTPATIENT)
Dept: OPERATING ROOM | Facility: HOSPITAL | Age: 55
End: 2025-02-28
Payer: MEDICARE

## 2025-02-28 ENCOUNTER — APPOINTMENT (OUTPATIENT)
Dept: CARDIOLOGY | Facility: HOSPITAL | Age: 55
End: 2025-02-28
Payer: MEDICARE

## 2025-02-28 VITALS
HEIGHT: 63 IN | BODY MASS INDEX: 24.8 KG/M2 | HEART RATE: 66 BPM | OXYGEN SATURATION: 99 % | WEIGHT: 140 LBS | TEMPERATURE: 97 F | SYSTOLIC BLOOD PRESSURE: 118 MMHG | DIASTOLIC BLOOD PRESSURE: 74 MMHG | RESPIRATION RATE: 14 BRPM

## 2025-02-28 DIAGNOSIS — F43.10 PTSD (POST-TRAUMATIC STRESS DISORDER): ICD-10-CM

## 2025-02-28 DIAGNOSIS — N32.81 OAB (OVERACTIVE BLADDER): Primary | ICD-10-CM

## 2025-02-28 DIAGNOSIS — G89.18 POSTOPERATIVE PAIN: ICD-10-CM

## 2025-02-28 DIAGNOSIS — N39.3 SUI (STRESS URINARY INCONTINENCE, FEMALE): ICD-10-CM

## 2025-02-28 LAB
ANION GAP SERPL CALC-SCNC: 11 MMOL/L (ref 10–20)
ATRIAL RATE: 72 BPM
BUN SERPL-MCNC: 5 MG/DL (ref 6–23)
CALCIUM SERPL-MCNC: 9.3 MG/DL (ref 8.6–10.3)
CHLORIDE SERPL-SCNC: 105 MMOL/L (ref 98–107)
CO2 SERPL-SCNC: 25 MMOL/L (ref 21–32)
CREAT SERPL-MCNC: 0.69 MG/DL (ref 0.5–1.05)
EGFRCR SERPLBLD CKD-EPI 2021: >90 ML/MIN/1.73M*2
ERYTHROCYTE [DISTWIDTH] IN BLOOD BY AUTOMATED COUNT: 15.1 % (ref 11.5–14.5)
GLUCOSE SERPL-MCNC: 93 MG/DL (ref 74–99)
HCT VFR BLD AUTO: 43.2 % (ref 36–46)
HGB BLD-MCNC: 13.8 G/DL (ref 12–16)
MCH RBC QN AUTO: 25.4 PG (ref 26–34)
MCHC RBC AUTO-ENTMCNC: 31.9 G/DL (ref 32–36)
MCV RBC AUTO: 80 FL (ref 80–100)
NRBC BLD-RTO: 0 /100 WBCS (ref 0–0)
P AXIS: 33 DEGREES
PLATELET # BLD AUTO: 262 X10*3/UL (ref 150–450)
POTASSIUM SERPL-SCNC: 3.8 MMOL/L (ref 3.5–5.3)
PR INTERVAL: 156 MS
PREGNANCY TEST URINE, POC: NEGATIVE
Q ONSET: 252 MS
QRS COUNT: 11 BEATS
QRS DURATION: 80 MS
QT INTERVAL: 398 MS
QTC CALCULATION(BAZETT): 436 MS
QTC FREDERICIA: 423 MS
R AXIS: 61 DEGREES
RBC # BLD AUTO: 5.43 X10*6/UL (ref 4–5.2)
SODIUM SERPL-SCNC: 137 MMOL/L (ref 136–145)
T AXIS: 28 DEGREES
T OFFSET: 451 MS
VENTRICULAR RATE: 72 BPM
WBC # BLD AUTO: 8.1 X10*3/UL (ref 4.4–11.3)

## 2025-02-28 PROCEDURE — 2720000007 HC OR 272 NO HCPCS: Performed by: STUDENT IN AN ORGANIZED HEALTH CARE EDUCATION/TRAINING PROGRAM

## 2025-02-28 PROCEDURE — 7100000010 HC PHASE TWO TIME - EACH INCREMENTAL 1 MINUTE: Performed by: STUDENT IN AN ORGANIZED HEALTH CARE EDUCATION/TRAINING PROGRAM

## 2025-02-28 PROCEDURE — 7100000009 HC PHASE TWO TIME - INITIAL BASE CHARGE: Performed by: STUDENT IN AN ORGANIZED HEALTH CARE EDUCATION/TRAINING PROGRAM

## 2025-02-28 PROCEDURE — 57288 REPAIR BLADDER DEFECT: CPT | Performed by: STUDENT IN AN ORGANIZED HEALTH CARE EDUCATION/TRAINING PROGRAM

## 2025-02-28 PROCEDURE — 2500000004 HC RX 250 GENERAL PHARMACY W/ HCPCS (ALT 636 FOR OP/ED): Performed by: NURSE ANESTHETIST, CERTIFIED REGISTERED

## 2025-02-28 PROCEDURE — 2500000004 HC RX 250 GENERAL PHARMACY W/ HCPCS (ALT 636 FOR OP/ED): Performed by: ANESTHESIOLOGY

## 2025-02-28 PROCEDURE — 7100000001 HC RECOVERY ROOM TIME - INITIAL BASE CHARGE: Performed by: STUDENT IN AN ORGANIZED HEALTH CARE EDUCATION/TRAINING PROGRAM

## 2025-02-28 PROCEDURE — 36415 COLL VENOUS BLD VENIPUNCTURE: CPT | Performed by: ANESTHESIOLOGY

## 2025-02-28 PROCEDURE — 80048 BASIC METABOLIC PNL TOTAL CA: CPT | Performed by: ANESTHESIOLOGY

## 2025-02-28 PROCEDURE — 2500000001 HC RX 250 WO HCPCS SELF ADMINISTERED DRUGS (ALT 637 FOR MEDICARE OP): Performed by: STUDENT IN AN ORGANIZED HEALTH CARE EDUCATION/TRAINING PROGRAM

## 2025-02-28 PROCEDURE — 3600000003 HC OR TIME - INITIAL BASE CHARGE - PROCEDURE LEVEL THREE: Performed by: STUDENT IN AN ORGANIZED HEALTH CARE EDUCATION/TRAINING PROGRAM

## 2025-02-28 PROCEDURE — RXMED WILLOW AMBULATORY MEDICATION CHARGE

## 2025-02-28 PROCEDURE — 3700000002 HC GENERAL ANESTHESIA TIME - EACH INCREMENTAL 1 MINUTE: Performed by: STUDENT IN AN ORGANIZED HEALTH CARE EDUCATION/TRAINING PROGRAM

## 2025-02-28 PROCEDURE — 2500000005 HC RX 250 GENERAL PHARMACY W/O HCPCS: Performed by: STUDENT IN AN ORGANIZED HEALTH CARE EDUCATION/TRAINING PROGRAM

## 2025-02-28 PROCEDURE — 2500000004 HC RX 250 GENERAL PHARMACY W/ HCPCS (ALT 636 FOR OP/ED): Performed by: STUDENT IN AN ORGANIZED HEALTH CARE EDUCATION/TRAINING PROGRAM

## 2025-02-28 PROCEDURE — 7100000002 HC RECOVERY ROOM TIME - EACH INCREMENTAL 1 MINUTE: Performed by: STUDENT IN AN ORGANIZED HEALTH CARE EDUCATION/TRAINING PROGRAM

## 2025-02-28 PROCEDURE — 85027 COMPLETE CBC AUTOMATED: CPT | Performed by: ANESTHESIOLOGY

## 2025-02-28 PROCEDURE — 93005 ELECTROCARDIOGRAM TRACING: CPT

## 2025-02-28 PROCEDURE — 2780000003 HC OR 278 NO HCPCS: Performed by: STUDENT IN AN ORGANIZED HEALTH CARE EDUCATION/TRAINING PROGRAM

## 2025-02-28 PROCEDURE — 81025 URINE PREGNANCY TEST: CPT | Performed by: ANESTHESIOLOGY

## 2025-02-28 PROCEDURE — 3600000008 HC OR TIME - EACH INCREMENTAL 1 MINUTE - PROCEDURE LEVEL THREE: Performed by: STUDENT IN AN ORGANIZED HEALTH CARE EDUCATION/TRAINING PROGRAM

## 2025-02-28 PROCEDURE — 3700000001 HC GENERAL ANESTHESIA TIME - INITIAL BASE CHARGE: Performed by: STUDENT IN AN ORGANIZED HEALTH CARE EDUCATION/TRAINING PROGRAM

## 2025-02-28 PROCEDURE — C1771 REP DEV, URINARY, W/SLING: HCPCS | Performed by: STUDENT IN AN ORGANIZED HEALTH CARE EDUCATION/TRAINING PROGRAM

## 2025-02-28 DEVICE — SLING, DESARA, BLUE TV, WITH 2.7 INTRODUCER: Type: IMPLANTABLE DEVICE | Site: BLADDER | Status: FUNCTIONAL

## 2025-02-28 RX ORDER — PROPOFOL 10 MG/ML
INJECTION, EMULSION INTRAVENOUS AS NEEDED
Status: DISCONTINUED | OUTPATIENT
Start: 2025-02-28 | End: 2025-02-28

## 2025-02-28 RX ORDER — KETOROLAC TROMETHAMINE 10 MG/1
10 TABLET, FILM COATED ORAL EVERY 6 HOURS PRN
Qty: 20 TABLET | Refills: 0 | Status: SHIPPED | OUTPATIENT
Start: 2025-02-28

## 2025-02-28 RX ORDER — HYDROMORPHONE HYDROCHLORIDE 0.2 MG/ML
0.2 INJECTION INTRAMUSCULAR; INTRAVENOUS; SUBCUTANEOUS EVERY 5 MIN PRN
Status: DISCONTINUED | OUTPATIENT
Start: 2025-02-28 | End: 2025-02-28 | Stop reason: HOSPADM

## 2025-02-28 RX ORDER — TAMSULOSIN HYDROCHLORIDE 0.4 MG/1
CAPSULE ORAL
Qty: 10 CAPSULE | Refills: 0 | Status: SHIPPED | OUTPATIENT
Start: 2025-02-28

## 2025-02-28 RX ORDER — SODIUM CHLORIDE, SODIUM LACTATE, POTASSIUM CHLORIDE, CALCIUM CHLORIDE 600; 310; 30; 20 MG/100ML; MG/100ML; MG/100ML; MG/100ML
20 INJECTION, SOLUTION INTRAVENOUS CONTINUOUS
Status: DISCONTINUED | OUTPATIENT
Start: 2025-02-28 | End: 2025-02-28 | Stop reason: HOSPADM

## 2025-02-28 RX ORDER — ALBUTEROL SULFATE 0.83 MG/ML
2.5 SOLUTION RESPIRATORY (INHALATION) ONCE
Status: DISCONTINUED | OUTPATIENT
Start: 2025-02-28 | End: 2025-02-28 | Stop reason: HOSPADM

## 2025-02-28 RX ORDER — DOCUSATE SODIUM 100 MG/1
100 CAPSULE, LIQUID FILLED ORAL 2 TIMES DAILY
Qty: 60 CAPSULE | Refills: 0 | Status: SHIPPED | OUTPATIENT
Start: 2025-02-28 | End: 2025-03-30

## 2025-02-28 RX ORDER — LIDOCAINE HCL/PF 100 MG/5ML
SYRINGE (ML) INTRAVENOUS AS NEEDED
Status: DISCONTINUED | OUTPATIENT
Start: 2025-02-28 | End: 2025-02-28

## 2025-02-28 RX ORDER — FAMOTIDINE 10 MG/ML
20 INJECTION, SOLUTION INTRAVENOUS ONCE
Status: COMPLETED | OUTPATIENT
Start: 2025-02-28 | End: 2025-02-28

## 2025-02-28 RX ORDER — POLYETHYLENE GLYCOL 3350 17 G/17G
17 POWDER, FOR SOLUTION ORAL DAILY
Qty: 510 G | Refills: 0 | Status: SHIPPED | OUTPATIENT
Start: 2025-02-28 | End: 2025-03-30

## 2025-02-28 RX ORDER — SODIUM CHLORIDE 0.9 G/100ML
INJECTION, SOLUTION IRRIGATION AS NEEDED
Status: DISCONTINUED | OUTPATIENT
Start: 2025-02-28 | End: 2025-02-28 | Stop reason: HOSPADM

## 2025-02-28 RX ORDER — METOCLOPRAMIDE HYDROCHLORIDE 5 MG/ML
10 INJECTION INTRAMUSCULAR; INTRAVENOUS ONCE
Status: DISCONTINUED | OUTPATIENT
Start: 2025-02-28 | End: 2025-02-28

## 2025-02-28 RX ORDER — LORAZEPAM 2 MG/ML
0.5 INJECTION INTRAMUSCULAR ONCE
Status: DISCONTINUED | OUTPATIENT
Start: 2025-02-28 | End: 2025-02-28 | Stop reason: HOSPADM

## 2025-02-28 RX ORDER — CELECOXIB 200 MG/1
200 CAPSULE ORAL ONCE
Status: COMPLETED | OUTPATIENT
Start: 2025-02-28 | End: 2025-02-28

## 2025-02-28 RX ORDER — ONDANSETRON HYDROCHLORIDE 2 MG/ML
4 INJECTION, SOLUTION INTRAVENOUS ONCE
Status: COMPLETED | OUTPATIENT
Start: 2025-02-28 | End: 2025-02-28

## 2025-02-28 RX ORDER — MIDAZOLAM HYDROCHLORIDE 1 MG/ML
INJECTION, SOLUTION INTRAMUSCULAR; INTRAVENOUS AS NEEDED
Status: DISCONTINUED | OUTPATIENT
Start: 2025-02-28 | End: 2025-02-28

## 2025-02-28 RX ORDER — CLONAZEPAM 1 MG/1
1 TABLET ORAL 2 TIMES DAILY
Qty: 60 TABLET | Refills: 1 | Status: SHIPPED | OUTPATIENT
Start: 2025-02-28 | End: 2025-04-29

## 2025-02-28 RX ORDER — ACETAMINOPHEN 325 MG/1
975 TABLET ORAL ONCE
Status: COMPLETED | OUTPATIENT
Start: 2025-02-28 | End: 2025-02-28

## 2025-02-28 RX ORDER — SODIUM CITRATE AND CITRIC ACID MONOHYDRATE 334; 500 MG/5ML; MG/5ML
30 SOLUTION ORAL ONCE
Status: DISCONTINUED | OUTPATIENT
Start: 2025-02-28 | End: 2025-02-28

## 2025-02-28 RX ORDER — FENTANYL CITRATE 50 UG/ML
INJECTION, SOLUTION INTRAMUSCULAR; INTRAVENOUS AS NEEDED
Status: DISCONTINUED | OUTPATIENT
Start: 2025-02-28 | End: 2025-02-28

## 2025-02-28 RX ORDER — TRAMADOL HYDROCHLORIDE 50 MG/1
50 TABLET ORAL EVERY 6 HOURS PRN
Qty: 15 TABLET | Refills: 0 | Status: SHIPPED | OUTPATIENT
Start: 2025-02-28

## 2025-02-28 RX ORDER — LIDOCAINE HYDROCHLORIDE AND EPINEPHRINE 10; 10 UG/ML; MG/ML
INJECTION, SOLUTION INFILTRATION; PERINEURAL AS NEEDED
Status: DISCONTINUED | OUTPATIENT
Start: 2025-02-28 | End: 2025-02-28 | Stop reason: HOSPADM

## 2025-02-28 RX ORDER — ACETAMINOPHEN 500 MG
1000 TABLET ORAL EVERY 6 HOURS PRN
Qty: 30 TABLET | Refills: 0 | Status: SHIPPED | OUTPATIENT
Start: 2025-02-28

## 2025-02-28 RX ORDER — ONDANSETRON HYDROCHLORIDE 2 MG/ML
4 INJECTION, SOLUTION INTRAVENOUS ONCE AS NEEDED
Status: DISCONTINUED | OUTPATIENT
Start: 2025-02-28 | End: 2025-02-28 | Stop reason: HOSPADM

## 2025-02-28 RX ORDER — CEFAZOLIN SODIUM 2 G/50ML
2 SOLUTION INTRAVENOUS ONCE
Status: COMPLETED | OUTPATIENT
Start: 2025-02-28 | End: 2025-02-28

## 2025-02-28 RX ADMIN — CELECOXIB 200 MG: 200 CAPSULE ORAL at 12:41

## 2025-02-28 RX ADMIN — CEFAZOLIN SODIUM 2 G: 2 SOLUTION INTRAVENOUS at 14:37

## 2025-02-28 RX ADMIN — ACETAMINOPHEN 975 MG: 325 TABLET ORAL at 12:41

## 2025-02-28 RX ADMIN — FENTANYL CITRATE 100 MCG: 50 INJECTION INTRAMUSCULAR; INTRAVENOUS at 14:41

## 2025-02-28 RX ADMIN — LIDOCAINE HYDROCHLORIDE 60 MG: 20 INJECTION, SOLUTION INTRAVENOUS at 14:41

## 2025-02-28 RX ADMIN — FAMOTIDINE 20 MG: 10 INJECTION, SOLUTION INTRAVENOUS at 12:41

## 2025-02-28 RX ADMIN — ONDANSETRON 4 MG: 2 INJECTION INTRAMUSCULAR; INTRAVENOUS at 12:41

## 2025-02-28 RX ADMIN — MIDAZOLAM 2 MG: 1 INJECTION INTRAMUSCULAR; INTRAVENOUS at 14:41

## 2025-02-28 RX ADMIN — PROPOFOL 200 MG: 10 INJECTION, EMULSION INTRAVENOUS at 14:41

## 2025-02-28 RX ADMIN — SODIUM CHLORIDE: 9 INJECTION, SOLUTION INTRAVENOUS at 14:14

## 2025-02-28 SDOH — HEALTH STABILITY: MENTAL HEALTH: CURRENT SMOKER: 1

## 2025-02-28 ASSESSMENT — PAIN - FUNCTIONAL ASSESSMENT
PAIN_FUNCTIONAL_ASSESSMENT: 0-10

## 2025-02-28 ASSESSMENT — PAIN SCALES - GENERAL
PAINLEVEL_OUTOF10: 0 - NO PAIN
PAIN_LEVEL: 0

## 2025-02-28 ASSESSMENT — COLUMBIA-SUICIDE SEVERITY RATING SCALE - C-SSRS
1. IN THE PAST MONTH, HAVE YOU WISHED YOU WERE DEAD OR WISHED YOU COULD GO TO SLEEP AND NOT WAKE UP?: NO
6. HAVE YOU EVER DONE ANYTHING, STARTED TO DO ANYTHING, OR PREPARED TO DO ANYTHING TO END YOUR LIFE?: NO
2. HAVE YOU ACTUALLY HAD ANY THOUGHTS OF KILLING YOURSELF?: NO

## 2025-02-28 NOTE — ANESTHESIA PROCEDURE NOTES
Airway  Date/Time: 2/28/2025 2:41 PM  Urgency: elective    Airway not difficult    Staffing  Performed: CRNA   Authorized by: EVANS Rai    Performed by: EVANS Rai  Patient location during procedure: OR    Indications and Patient Condition  Indications for airway management: anesthesia  Spontaneous ventilation: present  Sedation level: deep  Preoxygenated: yes  Patient position: sniffing  Mask difficulty assessment: 1 - vent by mask    Final Airway Details  Final airway type: supraglottic airway      Successful airway: classic  Size 4     Number of attempts at approach: 1

## 2025-02-28 NOTE — ANESTHESIA PREPROCEDURE EVALUATION
Patient: Suyapa Thao    Procedure Information       Date/Time: 02/28/25 1328    Procedure: DESARA SLING PLACEMENT - ~20 min for procedure, desara sling    Location: [facility] / [facility]    Surgeons: Jorje Nolasco MD            Relevant Problems   Anesthesia (within normal limits)      Cardiac   (+) Hyperlipidemia      Pulmonary   (+) COPD, mild (Multi)      Neuro   (+) Depression, recurrent (CMS-HCC)   (+) PTSD (post-traumatic stress disorder)   (+) Right cervical radiculopathy   (+) Right lumbar radiculitis      Hematology   (+) Mild anemia      Musculoskeletal   (+) Cervical spondylosis without myelopathy   (+) Degeneration of intervertebral disc of lumbosacral region   (+) Lumbar spondylosis   (+) Myofascial pain syndrome of lumbar spine   (+) Spinal stenosis of cervical region      HEENT   (+) Sensorineural hearing loss (SNHL) of both ears      ID   (+) Herpes genitalis in women      GYN   (+) Menorrhagia with irregular cycle       Clinical information reviewed:   Tobacco  Allergies  Meds  Problems  Med Hx  Surg Hx   Fam Hx          NPO Detail:  No data recorded     Physical Exam    Airway  Mallampati: II  TM distance: >3 FB  Neck ROM: full     Cardiovascular - normal exam     Dental - normal exam     Pulmonary - normal exam     Abdominal - normal exam         Anesthesia Plan    History of general anesthesia?: yes  History of complications of general anesthesia?: no    ASA 2     general     The patient is a current smoker.  Patient was previously instructed to abstain from smoking on day of procedure.  Patient did not smoke on day of procedure.    intravenous induction   Postoperative administration of opioids is intended.  Anesthetic plan and risks discussed with patient.    Plan discussed with CRNA.

## 2025-02-28 NOTE — OP NOTE
DESARA SLING PLACEMENT Operative Note     Date: 2025  OR Location: [facility]    Name: Suyapa Thao, : 1970, Age: 54 y.o., MRN: [id number], Sex: female    Diagnosis  Pre-op Diagnosis      * TEETEE (stress urinary incontinence, female) [N39.3] Post-op Diagnosis     * TEETEE (stress urinary incontinence, female) [N39.3]     Procedures  DESARA SLING PLACEMENT  54023 - RI SLING OPERATION STRESS INCONTINENCE      Surgeons      * Jorje Nolasco - Primary    Resident/Fellow/Other Assistant:  Surgeons and Role:  * No surgeons found with a matching role *    Staff:   Chrisulator: Mitra  Surgical Assistant: Pepito  Scrub Person: Kwabena Britt Person: Jose Manuel    Anesthesia Staff: CRNA: NOEL Rai-MARY    Procedure Summary  Anesthesia: General  ASA: II  Estimated Blood Loss: 10 mL  Intra-op Medications:   Administrations occurring from 1328 to 1438 on 25:   Medication Name Total Dose   ceFAZolin (Ancef) 2 g in dextrose (iso) IV 50 mL 2 g   NaCl 0.9 % bolus Cannot be calculated              Anesthesia Record               Intraprocedure I/O Totals          Intake    NaCl 0.9 % bolus 500.00 mL    Total Intake 500 mL          Specimen: No specimens collected              Drains and/or Catheters:   [REMOVED] Urethral Catheter Non-latex 16 Fr. (Removed)   Site Assessment Clean;Skin intact 25 1519   Collection Container Standard drainage bag 25 1519   Securement Method Securing device (Describe) 25 1519       Tourniquet Times:         Implants:  Implants       Type Name Action Serial No.      General Urology SLING, DESARA, BLUE TV, WITH 2.7 INTRODUCER - [id number] Implanted               Findings: normal bladder    Indications: Suyapa Thao is an 54 y.o. female who is having surgery for TEETEE (stress urinary incontinence, female) [N39.3].     The patient was seen in the preoperative area. The risks, benefits, complications, treatment options, non-operative alternatives, expected recovery and outcomes were  discussed with the patient. The possibilities of reaction to medication, pulmonary aspiration, injury to surrounding structures, bleeding, recurrent infection, the need for additional procedures, failure to diagnose a condition, and creating a complication requiring transfusion or operation were discussed with the patient. The patient concurred with the proposed plan, giving informed consent.  The site of surgery was properly noted/marked if necessary per policy. The patient has been actively warmed in preoperative area. Preoperative antibiotics have been ordered and given within 1 hours of incision. Venous thrombosis prophylaxis have been ordered including bilateral sequential compression devices    Procedure Details: Two sites were identified with each site 2.5 cm. from the midline at the level of the symphysis pubis. 1% lidocaine with epinephrine was injected vaginally under the urethra in the vaginal epithelium and bilaterally in the direction of two periurethral tunnels that were about to be created for the sling.  At this point, a 2 cm. sagittal excision was performed vaginally, beginning 1 cm. beneath the urethral meatus. Sharp dissection was carried out bilaterally using Metzenbaum scissors and periurethral tunnels were created bilaterally.  A Best catheter with a catheter guide was inserted into the bladder.   The TVT trocars were passed vaginally and retropubically with bladder deviation to the opposite side until it appeared suprapubically through a stab incision. The catheter was removed.  A cystoscopy was performed, which was entirely normal.  The bladder was drained.   The trocars were brought up entirely suprapubically and excised.  The end of the mesh was held in place with a Rachel clamp.   The end of the mesh was held in place with a Rachel clamp. Tension was adjusted on the mesh by drawing up on the suprapubic ends with a #8 Hegar dilator maintained between the tape and the urethra. The plastic  sheaths were removed bilaterally.  The excess mesh was excised suprapubically.  The suprapubic sites were closed with interrupted stitches of 4-0 Vicryl suture.  The vaginal incision was closed with a running stitch of 0 Vicryl suture.    Complications:  None; patient tolerated the procedure well.    Disposition: PACU - hemodynamically stable.  Condition: stable               Additional Details:     Attending Attestation: I was present and scrubbed for the entire procedure.    Jorje Nolasco  Phone Number: [phone]

## 2025-02-28 NOTE — ANESTHESIA POSTPROCEDURE EVALUATION
Patient: Suyapa Thao    Procedure Summary       Date: 02/28/25 Room / Location: [facility] / [facility]    Anesthesia Start: 1437 Anesthesia Stop: 1522    Procedure: DESARA SLING PLACEMENT Diagnosis:       TEETEE (stress urinary incontinence, female)      (TEETEE (stress urinary incontinence, female) [N39.3])    Surgeons: Jorje Nolasco MD Responsible Provider: EVANS Rai    Anesthesia Type: general ASA Status: 2            Anesthesia Type: general    Vitals Value Taken Time   /82 02/28/25 1540   Temp 36.6 °C (97.8 °F) 02/28/25 1545   Pulse 63 02/28/25 1545   Resp 13 02/28/25 1545   SpO2 99 % 02/28/25 1545       Anesthesia Post Evaluation    Patient location during evaluation: PACU  Patient participation: complete - patient participated  Level of consciousness: awake  Pain score: 0  Pain management: adequate  Airway patency: patent  Cardiovascular status: acceptable  Respiratory status: acceptable  Hydration status: acceptable  Postoperative Nausea and Vomiting: none    No notable events documented.

## 2025-03-03 NOTE — SIGNIFICANT EVENT
"Pt with c/o pain after void throughout weekend and today. \"Like when you hold your urine to long and then it hurts after you finally go\" Encouraged pt to give a call to physicians office to leave a message to RN , CNP , or physician to make them aware continues to experience this discomfort without improvement 3 days post-op. Pt verbalizes understanding  "

## 2025-03-07 ENCOUNTER — TELEPHONE (OUTPATIENT)
Dept: UROLOGY | Facility: CLINIC | Age: 55
End: 2025-03-07
Payer: MEDICARE

## 2025-03-07 DIAGNOSIS — R30.0 DYSURIA: Primary | ICD-10-CM

## 2025-03-07 RX ORDER — PHENAZOPYRIDINE HYDROCHLORIDE 200 MG/1
200 TABLET, FILM COATED ORAL 3 TIMES DAILY
Qty: 10 TABLET | Refills: 0 | Status: SHIPPED | OUTPATIENT
Start: 2025-03-07 | End: 2025-03-17

## 2025-03-07 RX ORDER — NITROFURANTOIN 25; 75 MG/1; MG/1
100 CAPSULE ORAL 2 TIMES DAILY
Qty: 10 CAPSULE | Refills: 0 | Status: SHIPPED | OUTPATIENT
Start: 2025-03-07 | End: 2025-03-12

## 2025-03-09 LAB
APPEARANCE UR: CLEAR
BACTERIA #/AREA URNS HPF: ABNORMAL /HPF
BACTERIA UR CULT: NORMAL
BILIRUB UR QL STRIP: NEGATIVE
COLOR UR: YELLOW
GLUCOSE UR QL STRIP: NEGATIVE
HGB UR QL STRIP: ABNORMAL
HYALINE CASTS #/AREA URNS LPF: ABNORMAL /LPF
KETONES UR QL STRIP: ABNORMAL
LEUKOCYTE ESTERASE UR QL STRIP: ABNORMAL
NITRITE UR QL STRIP: NEGATIVE
PH UR STRIP: ABNORMAL [PH] (ref 5–8)
PROT UR QL STRIP: NEGATIVE
RBC #/AREA URNS HPF: ABNORMAL /HPF
SERVICE CMNT-IMP: ABNORMAL
SP GR UR STRIP: 1.01 (ref 1–1.03)
SQUAMOUS #/AREA URNS HPF: ABNORMAL /HPF
WBC #/AREA URNS HPF: ABNORMAL /HPF

## 2025-03-10 DIAGNOSIS — R30.0 DYSURIA: ICD-10-CM

## 2025-03-11 ENCOUNTER — APPOINTMENT (OUTPATIENT)
Dept: OTOLARYNGOLOGY | Facility: CLINIC | Age: 55
End: 2025-03-11
Payer: MEDICARE

## 2025-03-11 VITALS — HEIGHT: 63 IN | BODY MASS INDEX: 25.02 KG/M2 | WEIGHT: 141.2 LBS

## 2025-03-11 DIAGNOSIS — J38.3 GLOTTIC INSUFFICIENCY: ICD-10-CM

## 2025-03-11 DIAGNOSIS — J38.01 COMPLETE PARALYSIS OF LEFT VOCAL CORD: ICD-10-CM

## 2025-03-11 DIAGNOSIS — R30.0 DYSURIA: ICD-10-CM

## 2025-03-11 DIAGNOSIS — R49.0 VOICE HOARSENESS: Primary | ICD-10-CM

## 2025-03-11 NOTE — PROGRESS NOTES
Chief Complaint  Chief Complaint   Patient presents with    Follow-up     Vocal cord         Pertinent History:  for chronic hoarseness and vocal cord polyps s/p excision in .   Underwent injection in the OR on 2024.     Interval History (2024):   She has noticed worsening voice. She is interested in a repeat injection as she found significant benefit from this last injection in 2024, lasting for many months. . She reports a longer interval with this injection than with previous injection. She as seen at [facility] and option for treatment to include thyroplasty and fat graft were discussed with her. Prior injection was not successful at [facility]  She has mild emphysema that has been stable.     Exam:  VOICE: Mild-moderate breathy hoarseness with improvement with  respiratory support   RESPIRATION: Breathing comfortably, no stridor.    ORAL CAVITY/OROPHARYNX/LIPS: Normal mucous membranes, normal floor of mouth/tongue/OP, no masses or lesions are noted.    SKIN: Neck skin is without scar or injury.    PSYCH: Alert and oriented with appropriate mood and affect.       PROCEDURE NOTE:  Recommended stroboscopy.  Risks, benefits,  and alternatives were explained. They wished to proceed and provides verbal consent.     PROCEDURE:  Flexible laryngoscopy with stroboscopy, CPT 52170     POSTPROCEDURE DIAGNOSIS:    INDICATIONS: Inability to tolerate mirror exam or abnormal findings on mirror, Flexible Laryngoscopy/Stroboscopy performed to assess one of the followin. Diagnosis of symptomatic disorder involving the voice, swallow, upper aerodigestive tract, including CAREN disorders, or  2. Preoperative evaluation of vocal cord function for individuals undergoing surgery where the RLN or vagus nerves are at risk of injury, or  3. Further evaluation of abnormalities of the upper aerodigestive tract discovered by another modality, such as CT, MRI, bronchoscopy or EGD    Description of Procedure:    After adequate afrin and  lidocaine spray, I advanced the endoscope.  Visualization of the nasopharynx, vallecula, posterior pharyngeal walls, pyriform, epiglottis and post cricoid areas was unremarkable.  The following laryngeal findings were noted:    vocal cord movement was immobile on the left, and normal on the right  closure was incomplete with a mid glottal gap  Sulcus in the medial aspect of the left vocal cord  Mucosal wave was increased on the left > right, improved   Compression was increased right FVC > AP   the subglottis was widely patent  Pharyngeal wall squeeze was normal     Procedure well tolerated.     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 She was very happy with prior injection, and would like to repeat injection.  We discussed that she may want to consider fat - but that either restylane or fat would be good options.     We discussed:  The risks, indications, and complications of performing a repeat injection with restylane and saline vs fat graft with saline with the understanding of variable outcome with fat were discussed.  She also has the option of  thyroplasty with silastic block were discussed with the patient, but she may not have good tolerance of this.   She will think about her options  call us with her preferred option.   We discussed the risks, benefits  and alternatives of intervention to include but not be limited to, bleeding and infection, damage to surrounding structures as well as scarring. We further discussed the possibility of change in voice with persistent or worsened hoarseness, swallowing difficulty, breathing difficulty,  medical complications and risks of anesthesia.     The patient's questions were answered.    Scribe Attestation  By signing my name below, IMerlyn  Crista Dos Santos attest that this documentation has been prepared under the direction and in the presence of Lois Kenney MD.

## 2025-03-12 DIAGNOSIS — R30.0 DYSURIA: ICD-10-CM

## 2025-03-13 DIAGNOSIS — R30.0 DYSURIA: ICD-10-CM

## 2025-03-14 DIAGNOSIS — R30.0 DYSURIA: ICD-10-CM

## 2025-03-17 DIAGNOSIS — R30.0 DYSURIA: ICD-10-CM

## 2025-03-18 ENCOUNTER — PATIENT MESSAGE (OUTPATIENT)
Dept: OTOLARYNGOLOGY | Facility: HOSPITAL | Age: 55
End: 2025-03-18
Payer: MEDICARE

## 2025-03-18 DIAGNOSIS — R30.0 DYSURIA: ICD-10-CM

## 2025-03-19 DIAGNOSIS — R30.0 DYSURIA: ICD-10-CM

## 2025-03-20 DIAGNOSIS — R30.0 DYSURIA: ICD-10-CM

## 2025-03-21 DIAGNOSIS — R30.0 DYSURIA: ICD-10-CM

## 2025-03-24 ENCOUNTER — APPOINTMENT (OUTPATIENT)
Dept: BEHAVIORAL HEALTH | Facility: CLINIC | Age: 55
End: 2025-03-24
Payer: MEDICARE

## 2025-03-24 VITALS
DIASTOLIC BLOOD PRESSURE: 82 MMHG | SYSTOLIC BLOOD PRESSURE: 132 MMHG | BODY MASS INDEX: 25.14 KG/M2 | WEIGHT: 141.9 LBS | TEMPERATURE: 97.9 F | HEART RATE: 90 BPM | RESPIRATION RATE: 16 BRPM | HEIGHT: 63 IN

## 2025-03-24 DIAGNOSIS — F43.10 PTSD (POST-TRAUMATIC STRESS DISORDER): ICD-10-CM

## 2025-03-24 DIAGNOSIS — Z79.899 CONTROLLED SUBSTANCE AGREEMENT SIGNED: ICD-10-CM

## 2025-03-24 DIAGNOSIS — R30.0 DYSURIA: ICD-10-CM

## 2025-03-24 PROBLEM — F32.2 CURRENT SEVERE EPISODE OF MAJOR DEPRESSIVE DISORDER WITHOUT PSYCHOTIC FEATURES WITHOUT PRIOR EPISODE (MULTI): Status: ACTIVE | Noted: 2025-03-24

## 2025-03-24 PROCEDURE — 3008F BODY MASS INDEX DOCD: CPT | Performed by: PSYCHIATRY & NEUROLOGY

## 2025-03-24 PROCEDURE — 99215 OFFICE O/P EST HI 40 MIN: CPT | Performed by: PSYCHIATRY & NEUROLOGY

## 2025-03-24 PROCEDURE — G2212 PROLONG OUTPT/OFFICE VIS: HCPCS | Performed by: PSYCHIATRY & NEUROLOGY

## 2025-03-24 RX ORDER — CLONAZEPAM 1 MG/1
1 TABLET ORAL 2 TIMES DAILY
Qty: 180 TABLET | Refills: 0 | Status: SHIPPED | OUTPATIENT
Start: 2025-03-24 | End: 2025-06-22

## 2025-03-24 RX ORDER — PRAZOSIN HYDROCHLORIDE 5 MG/1
5 CAPSULE ORAL 2 TIMES DAILY
Qty: 180 CAPSULE | Refills: 3 | Status: SHIPPED | OUTPATIENT
Start: 2025-03-24 | End: 2026-03-24

## 2025-03-24 RX ORDER — RISPERIDONE 4 MG/1
4 TABLET ORAL DAILY
Qty: 90 TABLET | Refills: 3 | Status: SHIPPED | OUTPATIENT
Start: 2025-03-24 | End: 2026-03-24

## 2025-03-24 RX ORDER — ESCITALOPRAM OXALATE 20 MG/1
20 TABLET ORAL DAILY
Qty: 90 TABLET | Refills: 3 | Status: SHIPPED | OUTPATIENT
Start: 2025-03-24 | End: 2026-03-24

## 2025-03-24 RX ORDER — QUETIAPINE FUMARATE 200 MG/1
200 TABLET, FILM COATED ORAL NIGHTLY
Qty: 90 TABLET | Refills: 3 | Status: SHIPPED | OUTPATIENT
Start: 2025-03-24 | End: 2026-03-24

## 2025-03-24 ASSESSMENT — COLUMBIA-SUICIDE SEVERITY RATING SCALE - C-SSRS
1. HAVE YOU WISHED YOU WERE DEAD OR WISHED YOU COULD GO TO SLEEP AND NOT WAKE UP?: NO
TOTAL  NUMBER OF ABORTED OR SELF INTERRUPTED ATTEMPTS LIFETIME: NO
ATTEMPT LIFETIME: NO
2. HAVE YOU ACTUALLY HAD ANY THOUGHTS OF KILLING YOURSELF?: NO
TOTAL  NUMBER OF INTERRUPTED ATTEMPTS LIFETIME: NO
6. HAVE YOU EVER DONE ANYTHING, STARTED TO DO ANYTHING, OR PREPARED TO DO ANYTHING TO END YOUR LIFE?: NO
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
## 2025-03-25 ENCOUNTER — TELEPHONE (OUTPATIENT)
Dept: POSTOP/PACU | Facility: HOSPITAL | Age: 55
End: 2025-03-25

## 2025-03-25 DIAGNOSIS — R49.0 HOARSENESS OF VOICE: ICD-10-CM

## 2025-03-25 DIAGNOSIS — J38.01 COMPLETE PARALYSIS OF LEFT VOCAL CORD: Primary | ICD-10-CM

## 2025-03-25 DIAGNOSIS — R49.0 DYSPHONIA: ICD-10-CM

## 2025-03-25 LAB
AMPHETAMINES UR QL: NEGATIVE NG/ML
BARBITURATES UR QL: NEGATIVE NG/ML
BENZODIAZ UR QL: NEGATIVE NG/ML
BZE UR QL: NEGATIVE NG/ML
CREAT UR-MCNC: 38.1 MG/DL
METHADONE UR QL: NEGATIVE NG/ML
OPIATES UR QL: NEGATIVE NG/ML
OXIDANTS UR QL: NEGATIVE MCG/ML
OXYCODONE UR QL: NEGATIVE NG/ML
PCP UR QL: NEGATIVE NG/ML
PH UR: 5.5 [PH] (ref 4.5–9)
QUEST NOTES AND COMMENTS: NORMAL
THC UR QL: NEGATIVE NG/ML

## 2025-04-01 ENCOUNTER — PREP FOR PROCEDURE (OUTPATIENT)
Dept: PAIN MEDICINE | Facility: CLINIC | Age: 55
End: 2025-04-01
Payer: MEDICARE

## 2025-04-01 DIAGNOSIS — M50.222 OTHER CERVICAL DISC DISPLACEMENT AT C5-C6 LEVEL: Primary | ICD-10-CM

## 2025-04-04 ENCOUNTER — OFFICE VISIT (OUTPATIENT)
Facility: HOSPITAL | Age: 55
End: 2025-04-04
Payer: MEDICARE

## 2025-04-04 VITALS — SYSTOLIC BLOOD PRESSURE: 124 MMHG | HEART RATE: 84 BPM | DIASTOLIC BLOOD PRESSURE: 80 MMHG

## 2025-04-04 DIAGNOSIS — Z09 POSTOP CHECK: Primary | ICD-10-CM

## 2025-04-04 PROCEDURE — 99211 OFF/OP EST MAY X REQ PHY/QHP: CPT | Performed by: STUDENT IN AN ORGANIZED HEALTH CARE EDUCATION/TRAINING PROGRAM

## 2025-04-04 ASSESSMENT — ENCOUNTER SYMPTOMS
DEPRESSION: 0
LOSS OF SENSATION IN FEET: 0
OCCASIONAL FEELINGS OF UNSTEADINESS: 0

## 2025-04-04 NOTE — PROGRESS NOTES
"HISTORY OF PRESENT ILLNESS:  Suyapa Thao is a 54 y.o. female who presents today for a follow up visit status post sling on 2/28/25. She is doing well. She has no stress incontinence.           Past Medical History  She has a past medical history of ADHD (attention deficit hyperactivity disorder), Adjustment disorder, Anxiety, Chronic pain disorder, COPD (chronic obstructive pulmonary disease) (Multi), Depression, Hallucination, Hyperlipidemia, Memory loss, Panic attack, Post-traumatic stress disorder, unspecified (08/02/2022), Psychiatric illness, PTSD (post-traumatic stress disorder), and Sleep difficulties.    Surgical History  She has a past surgical history that includes Other surgical history (03/29/2022) and Other surgical history (03/29/2022).     Social History  She reports that she has been smoking cigarettes. She has never been exposed to tobacco smoke. She has never used smokeless tobacco. She reports that she does not currently use alcohol. She reports that she does not use drugs.    Family History  Family History   Problem Relation Name Age of Onset    Hypertension Mother      Heart disease Father      Diabetes Father      Hypertension Father          Allergies  Patient has no known allergies.      A comprehensive 10+ review of systems was negative except for: see hpi                          PHYSICAL EXAMINATION:  BP Readings from Last 3 Encounters:   04/04/25 124/80   03/24/25 132/82   02/28/25 118/74      Wt Readings from Last 3 Encounters:   03/24/25 64.4 kg (141 lb 14.4 oz)   03/11/25 64 kg (141 lb 3.2 oz)   02/28/25 63.5 kg (140 lb)      BMI: Estimated body mass index is 25.14 kg/m² as calculated from the following:    Height as of 3/24/25: 1.6 m (5' 3\").    Weight as of 3/24/25: 64.4 kg (141 lb 14.4 oz).  BSA: Estimated body surface area is 1.69 meters squared as calculated from the following:    Height as of 3/24/25: 1.6 m (5' 3\").    Weight as of 3/24/25: 64.4 kg (141 lb 14.4 oz).  HEENT: " Normocephalic, atraumatic, PER EOMI, nonicteric, trachea normal, thyroid normal, oropharynx normal.  CARDIAC: regular rate & rhythm, S1 & S2 normal.  No heaves, thrills, gallops or murmurs.  LUNGS: Clear to auscultation, no spinal or CV tenderness.  EXTREMITIES: No evidence of cyanosis, clubbing or edema.       Incision c/d/I         Assessment:  54 y.o. female presents with mixed incontinence      MICHAEL    -She has already tried oxybutynin and Myrbetriq without improvement  -UDS + for TEETEE, DO     -S/P botox, 100 units, 1/21/24, doing well   -S/P sling on 2/28/25, doing well, no restrictions     -reports some stream spraying, will watch and if persists, can try PT     GUSM:  -Continue Vagifem  -continue estradiol       Follow up in 3 months        All questions and concerns were answered and addressed.  The patient expressed understanding and agrees with the plan.     Jorje Nolasco MD    Scribe Attestation  By signing my name below, I, Savannah King, Scribe   attest that this documentation has been prepared under the direction and in the presence of Jorje Nolasco MD.

## 2025-04-07 DIAGNOSIS — Z79.899 CONTROLLED SUBSTANCE AGREEMENT SIGNED: ICD-10-CM

## 2025-04-14 ENCOUNTER — HOSPITAL ENCOUNTER (OUTPATIENT)
Dept: OPERATING ROOM | Facility: CLINIC | Age: 55
Setting detail: OUTPATIENT SURGERY
Discharge: HOME | End: 2025-04-14
Payer: OTHER MISCELLANEOUS

## 2025-04-14 VITALS
WEIGHT: 137.35 LBS | DIASTOLIC BLOOD PRESSURE: 66 MMHG | HEIGHT: 63 IN | OXYGEN SATURATION: 95 % | SYSTOLIC BLOOD PRESSURE: 129 MMHG | BODY MASS INDEX: 24.34 KG/M2 | HEART RATE: 73 BPM | TEMPERATURE: 98.2 F | RESPIRATION RATE: 16 BRPM

## 2025-04-14 DIAGNOSIS — R49.0 DYSPHONIA: ICD-10-CM

## 2025-04-14 DIAGNOSIS — J38.01 COMPLETE PARALYSIS OF LEFT VOCAL CORD: Primary | ICD-10-CM

## 2025-04-14 DIAGNOSIS — R49.0 HOARSENESS OF VOICE: ICD-10-CM

## 2025-04-14 DIAGNOSIS — M50.222 OTHER CERVICAL DISC DISPLACEMENT AT C5-C6 LEVEL: ICD-10-CM

## 2025-04-14 PROCEDURE — 7100000010 HC PHASE TWO TIME - EACH INCREMENTAL 1 MINUTE

## 2025-04-14 PROCEDURE — 62321 NJX INTERLAMINAR CRV/THRC: CPT | Performed by: PHYSICAL MEDICINE & REHABILITATION

## 2025-04-14 PROCEDURE — 2500000004 HC RX 250 GENERAL PHARMACY W/ HCPCS (ALT 636 FOR OP/ED): Performed by: PHYSICAL MEDICINE & REHABILITATION

## 2025-04-14 PROCEDURE — 7100000009 HC PHASE TWO TIME - INITIAL BASE CHARGE

## 2025-04-14 PROCEDURE — 3600000006 HC OR TIME - EACH INCREMENTAL 1 MINUTE - PROCEDURE LEVEL ONE

## 2025-04-14 PROCEDURE — 3600000001 HC OR TIME - INITIAL BASE CHARGE - PROCEDURE LEVEL ONE

## 2025-04-14 PROCEDURE — 3700000012 HC SEDATION LEVEL 5+ TIME - INITIAL 15 MINUTES 5/> YEARS

## 2025-04-14 PROCEDURE — 2550000001 HC RX 255 CONTRASTS: Performed by: PHYSICAL MEDICINE & REHABILITATION

## 2025-04-14 PROCEDURE — 2500000005 HC RX 250 GENERAL PHARMACY W/O HCPCS: Performed by: PHYSICAL MEDICINE & REHABILITATION

## 2025-04-14 RX ORDER — SODIUM CHLORIDE 9 MG/ML
INJECTION, SOLUTION INTRAMUSCULAR; INTRAVENOUS; SUBCUTANEOUS AS NEEDED
Status: COMPLETED | OUTPATIENT
Start: 2025-04-14 | End: 2025-04-14

## 2025-04-14 RX ORDER — LIDOCAINE HYDROCHLORIDE 5 MG/ML
INJECTION, SOLUTION INFILTRATION; INTRAVENOUS AS NEEDED
Status: COMPLETED | OUTPATIENT
Start: 2025-04-14 | End: 2025-04-14

## 2025-04-14 RX ORDER — FENTANYL CITRATE 50 UG/ML
INJECTION, SOLUTION INTRAMUSCULAR; INTRAVENOUS AS NEEDED
Status: COMPLETED | OUTPATIENT
Start: 2025-04-14 | End: 2025-04-14

## 2025-04-14 RX ADMIN — FENTANYL CITRATE 75 MCG: 50 INJECTION, SOLUTION INTRAMUSCULAR; INTRAVENOUS at 11:01

## 2025-04-14 RX ADMIN — DEXAMETHASONE SODIUM PHOSPHATE 5 ML: 4 INJECTION INTRA-ARTICULAR; INTRALESIONAL; INTRAMUSCULAR; INTRAVENOUS; SOFT TISSUE at 11:07

## 2025-04-14 RX ADMIN — LIDOCAINE HYDROCHLORIDE 5 ML: 5 INJECTION, SOLUTION INFILTRATION at 11:04

## 2025-04-14 RX ADMIN — IOHEXOL 1 ML: 240 INJECTION, SOLUTION INTRATHECAL; INTRAVASCULAR; INTRAVENOUS; ORAL at 11:07

## 2025-04-14 RX ADMIN — SODIUM CHLORIDE 5 ML: 9 INJECTION, SOLUTION INTRAMUSCULAR; INTRAVENOUS; SUBCUTANEOUS at 11:01

## 2025-04-14 ASSESSMENT — PAIN SCALES - GENERAL
PAINLEVEL_OUTOF10: 6
PAINLEVEL_OUTOF10: 0 - NO PAIN
PAINLEVEL_OUTOF10: 0 - NO PAIN

## 2025-04-14 ASSESSMENT — PAIN - FUNCTIONAL ASSESSMENT
PAIN_FUNCTIONAL_ASSESSMENT: 0-10

## 2025-04-14 NOTE — PRE-SEDATION DOCUMENTATION
Last time ate or drank LN   Mouth opening  5  cm  Dentures -  Soft palate Uvula and amygdala visible   TCD 4 digits  Rom flexion to 60 degrees  ext to 50 degrees   No Current Cardio pulmonary symptoms   Low risk for IV anxiolysis

## 2025-04-14 NOTE — H&P
History Of Present Illness  Suyapa Thao is a 54 y.o. female presenting with cervical radic.     Past Medical History  Past Medical History:   Diagnosis Date    ADHD (attention deficit hyperactivity disorder)     Adjustment disorder     Anxiety     Chronic pain disorder     COPD (chronic obstructive pulmonary disease) (Multi)     Depression     Hallucination     Hyperlipidemia     Memory loss     Panic attack     Post-traumatic stress disorder, unspecified 08/02/2022    PTSD (post-traumatic stress disorder)    Psychiatric illness     PTSD (post-traumatic stress disorder)     Sleep difficulties        Surgical History  Past Surgical History:   Procedure Laterality Date    OTHER SURGICAL HISTORY  03/29/2022    Vocal Cordectomy    OTHER SURGICAL HISTORY  03/29/2022    Breast surgery/ augmentation        Social History  She reports that she has been smoking cigarettes. She has never been exposed to tobacco smoke. She has never used smokeless tobacco. She reports that she does not currently use alcohol. She reports that she does not use drugs.    Family History  Family History   Problem Relation Name Age of Onset    Hypertension Mother      Heart disease Father      Diabetes Father      Hypertension Father          Allergies  Patient has no known allergies.    Review of Systems   No Current Cardio pulmonary symptoms  Denied any fever or chills. No weight loss and no night sweats. No cough or sputum production. No diarrhea   The constipation has been responding to fibers and over the counter medications.     No bladder and bowel incontinence and no other changes in bladder and bowel. No skin changes.  Reports tiredness and fatigability only if the pain is not controlled.   Denied opioids diversion and abuse and denies alcoholism. Denies overuse of the pain medications.    Physical Exam   Heart regular breathing regular  Positive cervical root tension sign   Last Recorded Vitals  See nurses chart    Relevant  Results  Current Outpatient Medications on File Prior to Encounter   Medication Sig Dispense Refill    acetaminophen (Tylenol) 500 mg tablet Take 2 tablets (1,000 mg) by mouth every 6 hours if needed for mild pain (1 - 3). 30 tablet 0    albuterol 90 mcg/actuation inhaler INHALE 2 PUFFS BY MOUTH EVERY 4 HOURS AS NEEDED FOR WHEEZING OR SHORTNESS OF BREATH 8.5 g 2    clonazePAM (KlonoPIN) 1 mg tablet Take 1 tablet (1 mg) by mouth 2 times a day. 180 tablet 0    escitalopram (Lexapro) 20 mg tablet Take 1 tablet (20 mg) by mouth once daily. 90 tablet 3    estradiol (Vagifem) 10 mcg tablet vaginal tablet Insert 1 tablet (10 mcg) into the vagina 2 times a week. 24 tablet 3    Incassia 0.35 mg tablet TAKE 1 TABLET DAILY 84 tablet 3    ketorolac (Toradol) 10 mg tablet Take 1 tablet (10 mg) by mouth every 6 hours if needed for moderate pain (4 - 6). 20 tablet 0    ospemifene (Osphena) 60 mg tablet Take 1 tablet by mouth once daily.      prasterone, dhea, (Intrarosa) 6.5 mg insert Insert 6.5 mg into the vagina once daily. 30 each 3    prazosin (Minipress) 5 mg capsule Take 1 capsule (5 mg) by mouth 2 times a day. For nightmares 180 capsule 3    QUEtiapine (SEROquel) 200 mg tablet Take 1 tablet (200 mg) by mouth once daily at bedtime. 90 tablet 3    risperiDONE (RisperDAL) 4 mg tablet Take 1 tablet (4 mg) by mouth once daily. Take 1 tablet  (2 mg) at bedtime, and 1/2 tablet (1 mg) in the morning. Thanks. 90 tablet 3    rosuvastatin (Crestor) 5 mg tablet TAKE 1 TABLET(5 MG) BY MOUTH DAILY 90 tablet 1    tamsulosin (Flomax) 0.4 mg 24 hr capsule Take 3 days before surgery and 7 days after 10 capsule 0    tamsulosin (Flomax) 0.4 mg 24 hr capsule Take 1 capsule by mouth starting 3 days before surgery and for 7 days after 10 capsule 0    traMADol (Ultram) 50 mg tablet Take 1 tablet (50 mg) by mouth every 6 hours if needed for severe pain (7 - 10). 15 tablet 0    valACYclovir (Valtrex) 500 mg tablet Take 1 tablet (500 mg) by mouth  once daily. Take twice daily for 5 days if an outbreak occurs. 90 tablet 3     No current facility-administered medications on file prior to encounter.      Assessment/Plan   Assessment & Plan  Other cervical disc displacement at C5-C6 level      Cervical Alicia        I spent 10 minutes in the professional and overall care of this patient.      Daniele Elder MD

## 2025-04-14 NOTE — POST-PROCEDURE NOTE
Cervical epidural steroid injection with fluoroscopy guidance     Time-in:   1055 am   Time-out: 1115 am     Level:  C7T1 midline    Sedation:  75 mcg of IV fentanyl  . Patient   sedated but responsive to verbal commands. Monitoring of the vitals signs performed by qualified Registered Nurse during the entire procedure    Please see sedation record for sedation by RN    Indications: Failure to respond to conservative treatment with therapy and medications.      PROCEDURE:    The risks, benefits and alternatives of the procedure were discussed with the patient and agreed to proceed. The risks included but not limited to: infection, bleeding, paralysis, nerve injury sepsis and remotely death were discussed with the patient during the office and again in the pre procedure area.  The patient signed informed consent in the pre procedure area.     The patient was brought to procedure room and time out for the procedure was performed with the procedure room staff present. Patient placed in prone position on the procedure table and draped and covered appropriately.      Fluoroscopy machine was used to identify the C7T1 interspace.     Skin prepped and draped in sterile fashion over the cervical spine area.  Skin was infiltrated with local anesthetic with 0.5% lidocaine.  Tuohy needle was slowly introduced to the epidural space, verified with hanging drop and loss of resistance techniques. Then, adequate flow of contrast dye verified with fluoroscopy.  That flow reached up to the C45 level At that point, 10 mg of dexamethasone mixed with 5 mL of normal saline were injected.      felt the tingling down the upper  limb during the injection     Procedure tolerated very well.  No complications encountered.  Post procedure care discussed with the patient, agreed to proceed.   Patient instructed on keeping track of the pain level post discharge.   Patient discharged home, from the recovery room, in stable condition.

## 2025-04-17 ENCOUNTER — TELEPHONE (OUTPATIENT)
Dept: OBSTETRICS AND GYNECOLOGY | Facility: CLINIC | Age: 55
End: 2025-04-17

## 2025-04-17 ENCOUNTER — TELEMEDICINE (OUTPATIENT)
Dept: BEHAVIORAL HEALTH | Facility: CLINIC | Age: 55
End: 2025-04-17
Payer: OTHER MISCELLANEOUS

## 2025-04-17 DIAGNOSIS — F41.1 GAD (GENERALIZED ANXIETY DISORDER): ICD-10-CM

## 2025-04-17 DIAGNOSIS — Z12.31 ENCOUNTER FOR SCREENING MAMMOGRAM FOR MALIGNANT NEOPLASM OF BREAST: ICD-10-CM

## 2025-04-17 DIAGNOSIS — F32.2 CURRENT SEVERE EPISODE OF MAJOR DEPRESSIVE DISORDER WITHOUT PSYCHOTIC FEATURES WITHOUT PRIOR EPISODE (MULTI): ICD-10-CM

## 2025-04-17 DIAGNOSIS — F43.10 PTSD (POST-TRAUMATIC STRESS DISORDER): ICD-10-CM

## 2025-04-17 RX ORDER — RISPERIDONE 1 MG/1
1 TABLET ORAL 2 TIMES DAILY
Qty: 180 TABLET | Refills: 1 | Status: SHIPPED | OUTPATIENT
Start: 2025-04-17

## 2025-04-17 NOTE — PROGRESS NOTES
Follow-up visit  She is at home   Provider is in office     Virtual Consent    An interactive audio and video telecommunication system which permits real time communications between the patient (at the originating site) and provider (at the distant site) was utilized to provide this telehealth service.   Verbal consent was requested and obtained from Suyapa Thao on this date, 04/17/25 for a telehealth visit and the patient's location was confirmed at the time of the visit.     Subjective: She said she felt the same as last time. She still felt depressed daily with severity 8 of 10. Ten was the worst. Still felt hopeless, helpless, and worthless.  Denied having SI/HI/AVH, but felt someone followed occasionally. [place] safe at home, but did not feel safe on the stressed. She said she still felt nightmares and flashback. She felt she was in [place] and war zone. She need take more Klonopin for sleep because she had to fill out SSD form. Still have nightmares some night. She said she felt rested after she drank coffee. Concentration and memory were not good. Appetite was not good, had to force her to eat. Still felt agitated easily and hang up phone calls when she was very irritable.   Felt sluggish and foggy because of medication.  Still felt anxious daily all the abi and had panic daily. She said she felt a train running through her body daily.     No change in medical hx    Objective:   Appearance: well-groomed.   Demeanor: average.   Eye Contact: average.   Motor Activity: average.   Speech: clear.   Language: Neurologic language is intact.   Fund of Knowledge: intact fund of knowledge.   Delusions: None Reported.   Hallucinations: not reported  Self Harm: None Reported.   Aggressive: None Reported.   Mood: depressed and anxious.   Affect: appropriate, restricted most of the time   Orientation: alert, oriented x3.   Manner: cooperative.   Thought process: goal-directed.   Thought association: displays rational thought  process.   Content of thought: normal  Abstract/ Rational Thought: intact   Memory: grossly intact.   Behavior: normal motor activity, relaxed, good eye contact, calm.   Attention/Concentration: normal.   Cognition: intact.   Intelligence Estimate: average.   Executive Function: intact.   Insight: intact.   Judgement: intact.   Musculoskeletal: normal strength and tone. No abnormal movement   Impression: major depressive disorder, severe, chronic   LOLI - severe   PTSD - severe, chronic   Discussion/Plan:    Continue current treatments  Options for esketamine treatment were discussed with her. She fully understood.   She was informed that esketamine treatment can be covered by her private insurance per our coordinator, Lavinia Warren. If she wants to receive esketamine treatment through her private insurance, she need call Lavinia. She fully understood.   If she wants to receive esketamine treatment through [employer], she needs her  to call our hospital before we can treat her. She fully understood.   Follow-up in 1-2 months.   Wesley Dunn MD.

## 2025-04-23 ENCOUNTER — APPOINTMENT (OUTPATIENT)
Dept: PAIN MEDICINE | Facility: CLINIC | Age: 55
End: 2025-04-23
Payer: OTHER MISCELLANEOUS

## 2025-04-23 DIAGNOSIS — M50.222 HERNIATED NUCLEUS PULPOSUS, C5-6: ICD-10-CM

## 2025-04-23 DIAGNOSIS — M54.16 RIGHT LUMBAR RADICULITIS: ICD-10-CM

## 2025-04-23 DIAGNOSIS — M46.1 BILATERAL SACROILIITIS: Primary | ICD-10-CM

## 2025-04-23 ASSESSMENT — ENCOUNTER SYMPTOMS
SHORTNESS OF BREATH: 1
VOICE CHANGE: 1

## 2025-04-23 NOTE — PROGRESS NOTES
Allowed conditions       · Other cervical disc displacement at C5-C6 level (722.0) (M50.222)   · Right cervical radiculopathy (723.4) (M54.12)   · Cervical spondylosis without myelopathy (721.0) (M47.812)   . Bilateral sacroiliitis     Chief complaint  Neck and back pain      Verenda E LPN,   was present during the entire history and physical examination    History  Syuapa Thao is back for pain management office visit  She had the cervical CAMILLA and the pain is better in the neck and upper limbs  The SIJ are acting up right worse than the left side  The pain in the back is deep on the right  side.  The pain is below the belt line, it is continuous but it gets worse with extension of the lumbar spine and with leading for the right leg.  It improves with leaning forward.  Sitting up increases the pain. Improves with laying on the side. There are no reported radiation of the pain to the lower limbs.     Similar findigns on the left side   No bowel or bladder incontinence.  No sensory or motor changes in the lower limbs.    No changes or in the color or textures of the skin of the lower limbs.       Current pain level at 4/10 for the SIJ with aggravation  8/10      Review of Systems :  Denied any fever or chills. No weight loss and no night sweats. No cough or sputum production. No diarrhea   The constipation has been responding to fibers and over the counter medications.     No bladder and bowel incontinence and no other changes in bladder and bowel. No skin changes.  Reports tiredness and fatigability only if the pain is not controlled.     I further Asked about symptoms or changes affecting the vision, hearing, breathing, digestive system, urinary symptoms, skin, other musculoskeletal condition, neurological conditions these are negative except as detailed in the history and physical examination above       Physical examination  Awake, alert and oriented for time place and persons   Pupils are equal and reactive to  light and accommodation    CRT negative     mild reversal of the lumbar lordosis with slight scoliosis with right-sided concavity.  The scoliosis slightly corrected upon bending of the lumbar spine.  Tight muscle bands over the right  lower lumbar area and over the upper buttock area.  Gaenslen and Dexter are positive on the right side. Also compression test of the right SIJ is positive increasing the pain.   Similar findings on the left SIJ exam  Sahni test negative with no pain upon provocative testing of the lower facet joints  Straight leg raising was negative on both sides.  No sensorimotor deficits in the lower limbs.  Gume testing were negative for axial loading and log rotation.  No aberrant pain behavior.      Diagnosis  Problem List Items Addressed This Visit       Herniated nucleus pulposus, C5-6    Bilateral sacroiliitis - Primary    Relevant Orders    FL pain management    Sacroiliac Joint Injection    Right lumbar radiculitis        Plan  Reviewed the pain generators.  Went over the types of pain with neuropathic and nociceptive and different pathologies and therapeutic modalities. Discussed the mechanism of action of interventions from acupuncture, physical therapy , regular exercises, injections, botox, spinal cord stimulation, and role of surgery     Went over pathology of the intervertebral disc displacement and the anatomical relation to the Nerve roots and relation to the radicular symptoms. Went over treatment modalities with conservative treatment including acupuncture   and epidural steroid injection with fluoroscopy guidance and last resort of surgery     Went over the SIJ pain and mechanism of action of the intraarticular steroid injection    Since she gets relief from those and lasts fro over 6 motns will request repeat  Risks not limited to infection, sepsis, abscess, bleeding , nerve injury, paralysis, and death... I also discussed that I can not give guarantees of success from the  procedure. Additionally we discussed about the risks from long-term use of steroid therapy including but not limited to osteoporosis, bone fracture, and other complication affecting the skin , the bowel and reproductive system....   [id number]    Discussed about NSAIDS and I explained about the opioids sparing effect to allow keeping the opioids dose at minimal effective dose.   I went over the potential side effects of the NSAIDS on the gastrointestinal, renal and cardiovascular systems.      I detailed the side effects from the acetaminophen in the medication and made aware of those. I also explained about the cumulative effects on the organs and mainly the liver.        Follow-up 8 weeks or earlier if needed     The level of clinical decision making in this office visit,  is high, given  the serious and fluctuating nature of pain level and instensity with extensive consideration for whenever pain changes, there is always the risk of prolonged functional impairment requiring close patient monitoring with regular assessments and reassessments and high level medical decision making at every office visit. The amount and complexity of data reviewed is high given the patient clinical presentation, labs,  data, radiology reports, and other tests as discussed during office visits. Pertinent data whether positive or negative were taken in consideration in the process of making this high level medical decision.

## 2025-04-23 NOTE — PREPROCEDURE INSTRUCTIONS
Pre-Operative Instructions &?Checklist      Your surgery has been scheduled at [facility] at [address] [place], [address], [address], in the [facility]. Parking is to the left of the main entrance.     You will be contacted about the time of your surgery the day before your surgery (if your surgery is on a Monday, you will be called the Friday before surgery). If you are unable to answer the phone, a detailed voicemail message will be left. Make sure that your voicemail box is not full so a message can be left. If you have not received a call by 3:00 pm you may call [phone] between the hours of 3:00 and 4:00 pm. Please be available by phone the night before/day of surgery in case there is a change in the schedule which may require you to arrive earlier/later.     ?     14 DAYS BEFORE SURGERY STOP TAKING WEIGHT LOSS MEDICATIONS      ?7 DAYS BEFORE SURGERY STOP THESE MEDICATIONS:       *Multiple Vitamins containing Vitamin E       * Herbal supplements, Fish Oil, garlic pills, turmeric, CoQ enzyme       *Stop taking aspirin, aspirin-containing products, and NSAID's like Advil, Motrin, Aleve, Ibuprofen, Meloxicam, Celecoxib, and Diclofenac.. Tylenol is okay to take for pain relief.        *If you are currently taking Coumadin/Warfarin, we will have to coordinate that with your PCP &/or the Anticoagulation Clinic.     THE DAY BEFORE SURGERY:   Do not eat any food after midnight the night before surgery.    You are permitted to have no more than 4 ounces of clear liquid up to 3 hours before your arrival time.   Clear liquids are liquids you see through such as: water, pulp-free juices like apple or white grape juice, coffee and tea without creamer or milk (sugar is okay); clear soda and sports drinks.     DAY OF SURGERY TAKE THESE MEDICATIONS (if it is not listed, do not take it.)    Take: Escitalopram; risperidone; clonazepam; prazosin; use your albuterol inhaler, and bring your  inhaler to the surgery center.  If taking medications in tablet/capsule form take with a small sip of water.     ON THE MORNING OF SURGERY:       *Shower either the night before your surgery or the morning of your surgery       *Do not use moisturizers, creams, lotions or perfume, or make-up.       *Wear comfortable, loose fitting clothing.        *All jewelry and valuables should be left at home.       *Prosthetic devices such as contact lenses, hearing aids, dentures, eyelash extensions, hairpins and body piercings must be removed before surgery. Bring         containers for eyeglasses/contacts, dentures, or hearing aids with you.     ? Diabetics: Please check fasting blood sugars upon waking up. ?If fasting blood sugars are <80ml/dl, please drink 100ml/3oz. of apple juice no later than 2 hours prior                    to surgery.     ?BRING WITH YOU:        *Photo ID and insurance card       *Current list of medicines and allergies       *Pacemaker/Defibrillator/Heart stent cards       *Copy of your complete Advanced Directive/DHPOA, or proof of guardianship-if applicable     ?SMOKING:       *Quitting smoking can make a huge difference to your health and recovery from surgery. ?       *If you need help with quitting, call [phone].       ALCOHOL:       *No alcoholic beverages for 48 hours before surgery.     ?AFTER OUTPATIENT SURGERY:       *A responsible adult MUST accompany you at the time of discharge and stay with you for 24 hours after your surgery.       *You may NOT drive yourself home after surgery.       *You may use a taxi or ride sharing service ([employer], Uber) to return home ONLY if you are accompanied by a friend or family member       *Instructions for resuming your medications will be provided by your surgeon.     CONTACT SURGEON'S OFFICE IF YOU DEVELOP:       *Fever =/>?100.4 F        *New respiratory symptoms (e.g. cough, shortness of breath, respiratory distress, sore throat)       *Recent  loss of taste or smell       *Flu like symptoms such as headache, fatigue or gastrointestinal symptoms       *If you develop any open sores, shingles, burning or painful urination    AND/OR:       *You no longer wish to have the surgery.       *Any other personal circumstances change that may lead to the need to cancel or defer this surgery.       *You were admitted to any hospital within one week of your planned procedure.     ?If you have any questions regarding these preoperative instructions, you may call [phone]. If you have questions regarding you surgical procedure, or post-operative care/recovery please call your surgeon's office.     Link to [facility] Laboratory Services Locations   [email or web address]     Link to [facility]   [email or web address]

## 2025-04-24 ENCOUNTER — TELEPHONE (OUTPATIENT)
Dept: PAIN MEDICINE | Facility: CLINIC | Age: 55
End: 2025-04-24
Payer: MEDICARE

## 2025-04-24 NOTE — TELEPHONE ENCOUNTER
Dr. Lei wanted you to know that [facility] diagnosed her with DDD C5-6 and C67,  Foraminal stenosis, mild central canal stenosis at L3-L4 and Intervertebral disc lumbosacral region at L1-2, L2-3, L3-4, L5S1  The procedure they suggest is Cervical dissectomy and fusion for C56 and C67.  She says they should be sending you this information in writing.

## 2025-04-30 ENCOUNTER — APPOINTMENT (OUTPATIENT)
Dept: RADIOLOGY | Facility: CLINIC | Age: 55
End: 2025-04-30
Payer: MEDICARE

## 2025-05-01 ENCOUNTER — TELEPHONE (OUTPATIENT)
Dept: OTHER | Age: 55
End: 2025-05-01
Payer: MEDICARE

## 2025-05-05 ENCOUNTER — ANESTHESIA EVENT (OUTPATIENT)
Dept: OPERATING ROOM | Facility: CLINIC | Age: 55
End: 2025-05-05
Payer: MEDICARE

## 2025-05-05 RX ORDER — SODIUM CHLORIDE, SODIUM LACTATE, POTASSIUM CHLORIDE, CALCIUM CHLORIDE 600; 310; 30; 20 MG/100ML; MG/100ML; MG/100ML; MG/100ML
100 INJECTION, SOLUTION INTRAVENOUS CONTINUOUS
Status: CANCELLED | OUTPATIENT
Start: 2025-05-05 | End: 2025-05-05

## 2025-05-06 ENCOUNTER — ANESTHESIA (OUTPATIENT)
Dept: OPERATING ROOM | Facility: CLINIC | Age: 55
End: 2025-05-06
Payer: MEDICARE

## 2025-05-06 ENCOUNTER — HOSPITAL ENCOUNTER (OUTPATIENT)
Facility: CLINIC | Age: 55
Setting detail: OUTPATIENT SURGERY
Discharge: HOME | End: 2025-05-06
Attending: OTOLARYNGOLOGY | Admitting: OTOLARYNGOLOGY
Payer: MEDICARE

## 2025-05-06 VITALS
TEMPERATURE: 97.3 F | HEIGHT: 63 IN | BODY MASS INDEX: 24.77 KG/M2 | SYSTOLIC BLOOD PRESSURE: 121 MMHG | DIASTOLIC BLOOD PRESSURE: 76 MMHG | WEIGHT: 139.77 LBS | HEART RATE: 67 BPM | RESPIRATION RATE: 16 BRPM | OXYGEN SATURATION: 98 %

## 2025-05-06 DIAGNOSIS — J38.01 COMPLETE PARALYSIS OF LEFT VOCAL CORD: Primary | ICD-10-CM

## 2025-05-06 DIAGNOSIS — R49.0 HOARSENESS OF VOICE: ICD-10-CM

## 2025-05-06 DIAGNOSIS — R49.0 DYSPHONIA: ICD-10-CM

## 2025-05-06 PROCEDURE — 2500000004 HC RX 250 GENERAL PHARMACY W/ HCPCS (ALT 636 FOR OP/ED): Mod: JZ | Performed by: ANESTHESIOLOGIST ASSISTANT

## 2025-05-06 PROCEDURE — 2500000005 HC RX 250 GENERAL PHARMACY W/O HCPCS: Mod: JZ | Performed by: OTOLARYNGOLOGY

## 2025-05-06 PROCEDURE — 7100000009 HC PHASE TWO TIME - INITIAL BASE CHARGE: Performed by: OTOLARYNGOLOGY

## 2025-05-06 PROCEDURE — 7100000002 HC RECOVERY ROOM TIME - EACH INCREMENTAL 1 MINUTE: Performed by: OTOLARYNGOLOGY

## 2025-05-06 PROCEDURE — 3700000002 HC GENERAL ANESTHESIA TIME - EACH INCREMENTAL 1 MINUTE: Performed by: OTOLARYNGOLOGY

## 2025-05-06 PROCEDURE — A31571 PR LARYNGOSCOPY,DIRECT,SCOPE,INJ CORDS: Performed by: ANESTHESIOLOGIST ASSISTANT

## 2025-05-06 PROCEDURE — 31571 LARYNGOSCOP W/VC INJ + SCOPE: CPT | Performed by: OTOLARYNGOLOGY

## 2025-05-06 PROCEDURE — 3700000001 HC GENERAL ANESTHESIA TIME - INITIAL BASE CHARGE: Performed by: OTOLARYNGOLOGY

## 2025-05-06 PROCEDURE — 7100000010 HC PHASE TWO TIME - EACH INCREMENTAL 1 MINUTE: Performed by: OTOLARYNGOLOGY

## 2025-05-06 PROCEDURE — 3600000002 HC OR TIME - INITIAL BASE CHARGE - PROCEDURE LEVEL TWO: Performed by: OTOLARYNGOLOGY

## 2025-05-06 PROCEDURE — A31571 PR LARYNGOSCOPY,DIRECT,SCOPE,INJ CORDS: Performed by: ANESTHESIOLOGY

## 2025-05-06 PROCEDURE — 7100000001 HC RECOVERY ROOM TIME - INITIAL BASE CHARGE: Performed by: OTOLARYNGOLOGY

## 2025-05-06 PROCEDURE — 2500000004 HC RX 250 GENERAL PHARMACY W/ HCPCS (ALT 636 FOR OP/ED): Performed by: ANESTHESIOLOGIST ASSISTANT

## 2025-05-06 PROCEDURE — 2780000003 HC OR 278 NO HCPCS: Performed by: OTOLARYNGOLOGY

## 2025-05-06 PROCEDURE — 3600000007 HC OR TIME - EACH INCREMENTAL 1 MINUTE - PROCEDURE LEVEL TWO: Performed by: OTOLARYNGOLOGY

## 2025-05-06 DEVICE — IMPLANTABLE DEVICE
Type: IMPLANTABLE DEVICE | Site: VOCAL CORD | Status: FUNCTIONAL
Brand: RESTYLANE

## 2025-05-06 RX ORDER — ONDANSETRON HYDROCHLORIDE 2 MG/ML
4 INJECTION, SOLUTION INTRAVENOUS ONCE AS NEEDED
Status: DISCONTINUED | OUTPATIENT
Start: 2025-05-06 | End: 2025-05-06 | Stop reason: HOSPADM

## 2025-05-06 RX ORDER — ONDANSETRON HYDROCHLORIDE 2 MG/ML
INJECTION, SOLUTION INTRAVENOUS AS NEEDED
Status: DISCONTINUED | OUTPATIENT
Start: 2025-05-06 | End: 2025-05-06

## 2025-05-06 RX ORDER — FENTANYL CITRATE 50 UG/ML
INJECTION, SOLUTION INTRAMUSCULAR; INTRAVENOUS AS NEEDED
Status: DISCONTINUED | OUTPATIENT
Start: 2025-05-06 | End: 2025-05-06

## 2025-05-06 RX ORDER — SODIUM CHLORIDE 0.9 G/100ML
INJECTION, SOLUTION IRRIGATION AS NEEDED
Status: DISCONTINUED | OUTPATIENT
Start: 2025-05-06 | End: 2025-05-06 | Stop reason: HOSPADM

## 2025-05-06 RX ORDER — PROPOFOL 10 MG/ML
INJECTION, EMULSION INTRAVENOUS AS NEEDED
Status: DISCONTINUED | OUTPATIENT
Start: 2025-05-06 | End: 2025-05-06

## 2025-05-06 RX ORDER — FENTANYL CITRATE 50 UG/ML
50 INJECTION, SOLUTION INTRAMUSCULAR; INTRAVENOUS EVERY 5 MIN PRN
Status: DISCONTINUED | OUTPATIENT
Start: 2025-05-06 | End: 2025-05-06 | Stop reason: HOSPADM

## 2025-05-06 RX ORDER — LIDOCAINE HYDROCHLORIDE 10 MG/ML
0.1 INJECTION, SOLUTION EPIDURAL; INFILTRATION; INTRACAUDAL; PERINEURAL ONCE
Status: DISCONTINUED | OUTPATIENT
Start: 2025-05-06 | End: 2025-05-06 | Stop reason: HOSPADM

## 2025-05-06 RX ORDER — SODIUM CHLORIDE, SODIUM LACTATE, POTASSIUM CHLORIDE, CALCIUM CHLORIDE 600; 310; 30; 20 MG/100ML; MG/100ML; MG/100ML; MG/100ML
INJECTION, SOLUTION INTRAVENOUS CONTINUOUS PRN
Status: DISCONTINUED | OUTPATIENT
Start: 2025-05-06 | End: 2025-05-06

## 2025-05-06 RX ORDER — OXYCODONE HYDROCHLORIDE 5 MG/1
5 TABLET ORAL EVERY 4 HOURS PRN
Status: DISCONTINUED | OUTPATIENT
Start: 2025-05-06 | End: 2025-05-06 | Stop reason: HOSPADM

## 2025-05-06 RX ORDER — LABETALOL HYDROCHLORIDE 5 MG/ML
5 INJECTION, SOLUTION INTRAVENOUS ONCE AS NEEDED
Status: DISCONTINUED | OUTPATIENT
Start: 2025-05-06 | End: 2025-05-06 | Stop reason: HOSPADM

## 2025-05-06 RX ORDER — ROCURONIUM BROMIDE 10 MG/ML
INJECTION, SOLUTION INTRAVENOUS AS NEEDED
Status: DISCONTINUED | OUTPATIENT
Start: 2025-05-06 | End: 2025-05-06

## 2025-05-06 RX ORDER — ACETAMINOPHEN 325 MG/1
650 TABLET ORAL EVERY 4 HOURS PRN
Status: DISCONTINUED | OUTPATIENT
Start: 2025-05-06 | End: 2025-05-06 | Stop reason: HOSPADM

## 2025-05-06 RX ORDER — SODIUM CHLORIDE 9 MG/ML
INJECTION, SOLUTION INTRAMUSCULAR; INTRAVENOUS; SUBCUTANEOUS AS NEEDED
Status: DISCONTINUED | OUTPATIENT
Start: 2025-05-06 | End: 2025-05-06 | Stop reason: HOSPADM

## 2025-05-06 RX ORDER — FENTANYL CITRATE 50 UG/ML
25 INJECTION, SOLUTION INTRAMUSCULAR; INTRAVENOUS EVERY 5 MIN PRN
Status: DISCONTINUED | OUTPATIENT
Start: 2025-05-06 | End: 2025-05-06 | Stop reason: HOSPADM

## 2025-05-06 RX ORDER — LIDOCAINE HYDROCHLORIDE 20 MG/ML
INJECTION, SOLUTION INTRAVENOUS AS NEEDED
Status: DISCONTINUED | OUTPATIENT
Start: 2025-05-06 | End: 2025-05-06

## 2025-05-06 RX ADMIN — FENTANYL CITRATE 50 MCG: 50 INJECTION, SOLUTION INTRAMUSCULAR; INTRAVENOUS at 11:16

## 2025-05-06 RX ADMIN — PROPOFOL 150 MG: 10 INJECTION, EMULSION INTRAVENOUS at 11:10

## 2025-05-06 RX ADMIN — SODIUM CHLORIDE, POTASSIUM CHLORIDE, SODIUM LACTATE AND CALCIUM CHLORIDE: 600; 310; 30; 20 INJECTION, SOLUTION INTRAVENOUS at 11:18

## 2025-05-06 RX ADMIN — SODIUM CHLORIDE, POTASSIUM CHLORIDE, SODIUM LACTATE AND CALCIUM CHLORIDE: 600; 310; 30; 20 INJECTION, SOLUTION INTRAVENOUS at 10:46

## 2025-05-06 RX ADMIN — FENTANYL CITRATE 50 MCG: 50 INJECTION, SOLUTION INTRAMUSCULAR; INTRAVENOUS at 11:10

## 2025-05-06 RX ADMIN — PROPOFOL 50 MG: 10 INJECTION, EMULSION INTRAVENOUS at 11:19

## 2025-05-06 RX ADMIN — LIDOCAINE HYDROCHLORIDE 30 MG: 20 INJECTION, SOLUTION INTRAVENOUS at 11:10

## 2025-05-06 RX ADMIN — SUGAMMADEX 200 MG: 100 INJECTION, SOLUTION INTRAVENOUS at 11:23

## 2025-05-06 RX ADMIN — ONDANSETRON 4 MG: 2 INJECTION INTRAMUSCULAR; INTRAVENOUS at 11:23

## 2025-05-06 RX ADMIN — ROCURONIUM BROMIDE 30 MG: 10 SOLUTION INTRAVENOUS at 11:10

## 2025-05-06 SDOH — HEALTH STABILITY: MENTAL HEALTH: CURRENT SMOKER: 1

## 2025-05-06 ASSESSMENT — PAIN SCALES - GENERAL
PAINLEVEL_OUTOF10: 0 - NO PAIN
PAIN_LEVEL: 0
PAINLEVEL_OUTOF10: 6

## 2025-05-06 ASSESSMENT — PAIN - FUNCTIONAL ASSESSMENT
PAIN_FUNCTIONAL_ASSESSMENT: 0-10

## 2025-05-06 ASSESSMENT — COLUMBIA-SUICIDE SEVERITY RATING SCALE - C-SSRS
2. HAVE YOU ACTUALLY HAD ANY THOUGHTS OF KILLING YOURSELF?: NO
1. IN THE PAST MONTH, HAVE YOU WISHED YOU WERE DEAD OR WISHED YOU COULD GO TO SLEEP AND NOT WAKE UP?: NO
6. HAVE YOU EVER DONE ANYTHING, STARTED TO DO ANYTHING, OR PREPARED TO DO ANYTHING TO END YOUR LIFE?: NO

## 2025-05-06 NOTE — DISCHARGE INSTRUCTIONS
You had a  injection of your vocal cord.  OK to use your voice after 24-48 hours.  Please do so conservatively, avoiding yelling, shouting, or throat clearing/cough.  Use Tylenol or advil for pain.     My office will call tomorrow to schedule your follow up which will be in 7-10 days with Zora and then with me in about 4-6 weeks.  If you have further questions, please call my office at [phone], press 2 and ask for my  or nurse. If after hours, please call the [phone] and ask for the ENT resident on call.

## 2025-05-06 NOTE — ANESTHESIA POSTPROCEDURE EVALUATION
Patient: Suyapa Thao    Procedure Summary       Date: 05/06/25 Room / Location: [facility]    Anesthesia Start: 1046 Anesthesia Stop: 1131    Procedure: MICRODIRECT LARYNGOSCOPY, RESTYLANE INJECTION OF LEFT VOCAL CORD, W/ POSSIBLE RIGHT VOCAL CORD, NORMAL SALINE INJECTION OF LEFT VOCAL CORD (Bilateral) Diagnosis:       Complete paralysis of left vocal cord      Hoarseness of voice      Dysphonia      (Complete paralysis of left vocal cord [J38.01])      (Hoarseness of voice [R49.0])      (Dysphonia [R49.0])    Surgeons: Lois Kenney MD Responsible Provider: Misha Cazares MD    Anesthesia Type: general ASA Status: 2            Anesthesia Type: general    Vitals Value Taken Time   /69 05/06/25 11:40   Temp 36.3 °C (97.3 °F) 05/06/25 11:28   Pulse 66 05/06/25 11:40   Resp 14 05/06/25 11:40   SpO2 94 % 05/06/25 11:40       Anesthesia Post Evaluation    Patient location during evaluation: PACU  Patient participation: complete - patient participated  Level of consciousness: awake  Pain score: 0  Pain management: adequate  Airway patency: patent  Cardiovascular status: acceptable  Respiratory status: acceptable  Hydration status: acceptable  Postoperative Nausea and Vomiting: none        No notable events documented.

## 2025-05-06 NOTE — ANESTHESIA PREPROCEDURE EVALUATION
Patient: Suyapa Thao    Procedure Information       Date/Time: 02/28/25 1328    Procedure: DESARA SLING PLACEMENT - ~20 min for procedure, desara sling    Location: [facility] OR 07 / [facility]    Surgeons: Jorje Nolasco MD            Relevant Problems   Anesthesia (within normal limits)      Cardiac   (+) Hyperlipidemia      Pulmonary   (+) COPD, mild (Multi)      Neuro   (+) Current severe episode of major depressive disorder without psychotic features without prior episode (Multi)   (+) Depression, recurrent (CMS-HCC)   (+) PTSD (post-traumatic stress disorder)   (+) Right cervical radiculopathy   (+) Right lumbar radiculitis      Hematology   (+) Mild anemia      Musculoskeletal   (+) Degeneration of intervertebral disc of lumbosacral region   (+) Myofascial pain syndrome of lumbar spine   (+) Spinal stenosis of cervical region      HEENT   (+) Sensorineural hearing loss (SNHL) of both ears      ID   (+) Herpes genitalis in women      GYN   (+) Menorrhagia with irregular cycle       Clinical information reviewed:   Tobacco  Allergies  Meds  Problems  Med Hx  Surg Hx  OB Status    Fam Hx  Soc Hx        NPO Detail:  NPO/Void Status  Date of Last Liquid: 05/06/25  Time of Last Liquid: 0000  Date of Last Solid: 05/06/25  Time of Last Solid: 0000  Last Intake Type: Clear fluids  Time of Last Void: 0925         Physical Exam    Airway  Mallampati: II  TM distance: >3 FB  Neck ROM: full  Mouth opening: 3 or more finger widths     Cardiovascular - normal exam   Dental - normal exam     Pulmonary - normal exam   Abdominal - normal exam         Anesthesia Plan    History of general anesthesia?: yes  History of complications of general anesthesia?: no    ASA 2     general     The patient is a current smoker.  Patient was previously instructed to abstain from smoking on day of procedure.  Patient did not smoke on day of procedure.    intravenous induction   Postoperative pain plan includes opioids.  Anesthetic plan and  risks discussed with patient.    Plan discussed with CRNA.

## 2025-05-06 NOTE — H&P
History Of Present Illness  Suyapa Thao is a 54 y.o. female presenting with hoarseness with bilateral vocal cord weakness.     Past Medical History  She has a past medical history of ADHD (attention deficit hyperactivity disorder), Adjustment disorder, Anxiety, Cervical disc disease (2014), Chronic pain disorder, COPD (chronic obstructive pulmonary disease) (Multi), Depression, Hallucination, Hyperlipidemia, Memory loss, Panic attack, Post-traumatic stress disorder, unspecified (08/02/2022), Psychiatric illness, PTSD (post-traumatic stress disorder), and Sleep difficulties.    Surgical History  She has a past surgical history that includes Other surgical history (03/29/2022) and Other surgical history (03/29/2022).     Social History  She reports that she has been smoking cigarettes. She has never been exposed to tobacco smoke. She has never used smokeless tobacco. She reports that she does not currently use alcohol. She reports that she does not use drugs.    Allergies  Patient has no known allergies.    Review of Systems none contributory     Physical Exam  Mild hoarseness  Normal breathing  Normal ambulation and strength  Normal perfusion    Last Recorded Vitals  There were no vitals taken for this visit.     Assessment/Plan   Assessment & Plan  Complete paralysis of left vocal cord    Dysphonia    Hoarseness of voice      Plan for left > right vocal cord injection with restylane.  Left vocal cord injection with saline.   Consent reviewed and she agrees to proceed.          Lois Kenney MD

## 2025-05-06 NOTE — OP NOTE
OPERATIVE NOTE     Date:  2025 [place] [facility]    Name: Suyapa Thao : 1970, Age: 54 y.o., MRN: [id number], Sex: female      Surgeons      Lois Kenney MD    Resident/Fellow/Other Assistant:  None were associated with this case.    Anesthesia: General  ASA: II  Anesthesia Staff: Anesthesiologist: Misha Cazares MD  C-AA: HUSSAIN Garcia  Staff: Circulator: Abraham Patiño RN  Scrub Person: Elbamariana Cam         Preoperative Diagnosis:  Hoarseness  2.   Vocal Cord Paresis/Paralysis- Left.    Postoperative Diagnosis:  Hoarseness  Vocal Cord Paresis/ParalysisParesis Left.    Procedure Performed:  1. Microdirect laryngoscopy with injection, CPT 65864- Bilateral    Indications:  Suyapa Thao is a 54 y.o. female who presents with hoarseness with Bilateral vocal cord weakness (left vocal cord immobility and right vocal cord paresis)  with glottic insuffiency and desire for vocal rehabilitation.  The risks, indications, and complications of surgery were discussed including, but not limited to bleeding and infection, damage to surrounding structures including the teeth, gums, lips tongue, surrounding muscles, nerves, blood vessels as well as scarring. We further discussed the possibility of change in voice with persistent or worsened hoarseness, swallowing difficulty, breathing difficulty, taste change that may be long lasting,  medical complications and risks of anesthesia.     Operative Findings:  1. Laryngoscopic view: normal.  2. Injection with : saline on the left, restylane bilaterally, left > right.    Operative Technique:   The patient was brought back to the operating room and transferred to the operating room table.  The patient was pre-oxygenated with 100% oxygen via facemask.  Following time-out the patient received anesthesia and was intubated with 5-0  MLT tube.  The bed was turned 90° to the laryngology team.    An initial diagnostic laryngoscopy with microscope was  performed which revealed a normal vallecula, bilateral pyriforms, immediate subglottis and no gross lesions or masses.  The Dedo laryngoscope was advanced until the vocal cords were visualized and suspended.  The microscope was moved in position, and utilized for remainder of the procedure.  Appropriate eye and face protections was applied to the patient.  Under microscopic guidance, the left medial vocal cord was injected with preservative free saline, with .6cc to distend the previously identified sulcus.  This distended nicely.  Both vocal cords were noted to have multiple vocal cord ectasias on them.  Next the  right vocal cord was injected lateral to the vocal ligament with restylane with .3cc, and the left vocal cord was injected with .5cc .    The patient tolerated this well.  The patient was then turned back to Anesthesia for extubation and returned to the recovery room in satisfactory condition.  Sponge, needle, instrument counts were reported as correct.  Estimated blood loss was minimal.     Attending Attestation: I performed the procedure.    Estimated Blood Loss: None  Complications: none  Condition of the patient: Stable  Disposition: PACU  Additional information: Rationale for -22 modifier:  This procedure took  longer due to treatment to the bilateral vocal cords.        Lois Kenney MD [facility]   Implants:  Implants       Type Name Action Serial No.       1ML RESTYLANE INJECTABLE GEL Implanted

## 2025-05-06 NOTE — ANESTHESIA PROCEDURE NOTES
Airway  Date/Time: 5/6/2025 11:10 AM  Reason: elective    Airway not difficult    Staffing  Performed: HUSSAIN   Authorized by: Misha Cazares MD    Performed by: HUSSAIN Garcia  Patient location during procedure: OR    Patient Condition  Indications for airway management: anesthesia  Patient position: sniffing  Sedation level: deep     Final Airway Details   Preoxygenated: yes  Final airway type: endotracheal airway  Successful airway: ETT  Cuffed: yes   Successful intubation technique: direct laryngoscopy  Adjuncts used in placement: intubating stylet  Endotracheal tube insertion site: oral  Blade: Delano  Blade size: #3  ETT size (mm): 5.0  Cormack-Lehane Classification: grade I - full view of glottis  Placement verified by: chest auscultation and capnometry   Measured from: lips  ETT to lips (cm): 21  Number of attempts at approach: 1  Number of other approaches attempted: 0

## 2025-05-07 ENCOUNTER — TELEPHONE (OUTPATIENT)
Dept: OTOLARYNGOLOGY | Facility: HOSPITAL | Age: 55
End: 2025-05-07
Payer: MEDICARE

## 2025-05-07 NOTE — TELEPHONE ENCOUNTER
Called pt to see how she is doing after her surgery yesterday with Dr. Kenney. Pt states it is a little painful/sore to swallow. Pt denies any fever, chills, nausea, or vomiting. Pt states she is able to eat and drink fine. Pt states her breathing is good and her voice is good. Encouraged pt to take tylenol or ibuprofen to help with her pain/soreness. Pt verbalized understanding. Pt is aware [place] will be calling to set up her POVs. Dr. Kenney aware.

## 2025-05-13 ENCOUNTER — APPOINTMENT (OUTPATIENT)
Dept: PRIMARY CARE | Facility: CLINIC | Age: 55
End: 2025-05-13
Payer: OTHER MISCELLANEOUS

## 2025-05-13 VITALS
SYSTOLIC BLOOD PRESSURE: 124 MMHG | DIASTOLIC BLOOD PRESSURE: 82 MMHG | WEIGHT: 139.7 LBS | TEMPERATURE: 97.9 F | RESPIRATION RATE: 18 BRPM | BODY MASS INDEX: 24.75 KG/M2 | OXYGEN SATURATION: 98 % | HEART RATE: 66 BPM | HEIGHT: 63 IN

## 2025-05-13 DIAGNOSIS — Z00.00 ROUTINE GENERAL MEDICAL EXAMINATION AT HEALTH CARE FACILITY: Primary | ICD-10-CM

## 2025-05-13 DIAGNOSIS — F17.200 SMOKING ADDICTION: ICD-10-CM

## 2025-05-13 DIAGNOSIS — J38.01 COMPLETE PARALYSIS OF LEFT VOCAL CORD: ICD-10-CM

## 2025-05-13 DIAGNOSIS — E55.9 VITAMIN D DEFICIENCY, UNSPECIFIED: ICD-10-CM

## 2025-05-13 DIAGNOSIS — J44.9 CHRONIC OBSTRUCTIVE PULMONARY DISEASE, UNSPECIFIED COPD TYPE (MULTI): ICD-10-CM

## 2025-05-13 DIAGNOSIS — N95.1 PERIMENOPAUSE: ICD-10-CM

## 2025-05-13 DIAGNOSIS — G60.9 IDIOPATHIC PERIPHERAL NEUROPATHY: ICD-10-CM

## 2025-05-13 DIAGNOSIS — S19.83XS: ICD-10-CM

## 2025-05-13 DIAGNOSIS — F43.10 PTSD (POST-TRAUMATIC STRESS DISORDER): ICD-10-CM

## 2025-05-13 DIAGNOSIS — R73.03 PREDIABETES: ICD-10-CM

## 2025-05-13 DIAGNOSIS — E78.5 HYPERLIPIDEMIA, UNSPECIFIED HYPERLIPIDEMIA TYPE: ICD-10-CM

## 2025-05-13 PROBLEM — N71.9 ENDOMETRITIS: Status: ACTIVE | Noted: 2024-03-20

## 2025-05-13 PROBLEM — N36.8 URETHRAL CYST: Status: ACTIVE | Noted: 2024-03-20

## 2025-05-13 PROCEDURE — 99214 OFFICE O/P EST MOD 30 MIN: CPT | Performed by: INTERNAL MEDICINE

## 2025-05-13 PROCEDURE — 3008F BODY MASS INDEX DOCD: CPT | Performed by: INTERNAL MEDICINE

## 2025-05-13 PROCEDURE — G0439 PPPS, SUBSEQ VISIT: HCPCS | Performed by: INTERNAL MEDICINE

## 2025-05-13 PROCEDURE — 4004F PT TOBACCO SCREEN RCVD TLK: CPT | Performed by: INTERNAL MEDICINE

## 2025-05-13 SDOH — ECONOMIC STABILITY: FOOD INSECURITY: WITHIN THE PAST 12 MONTHS, THE FOOD YOU BOUGHT JUST DIDN'T LAST AND YOU DIDN'T HAVE MONEY TO GET MORE.: NEVER TRUE

## 2025-05-13 SDOH — ECONOMIC STABILITY: FOOD INSECURITY: WITHIN THE PAST 12 MONTHS, YOU WORRIED THAT YOUR FOOD WOULD RUN OUT BEFORE YOU GOT MONEY TO BUY MORE.: NEVER TRUE

## 2025-05-13 ASSESSMENT — PATIENT HEALTH QUESTIONNAIRE - PHQ9
1. LITTLE INTEREST OR PLEASURE IN DOING THINGS: NOT AT ALL
SUM OF ALL RESPONSES TO PHQ9 QUESTIONS 1 & 2: 0
2. FEELING DOWN, DEPRESSED OR HOPELESS: NOT AT ALL

## 2025-05-13 ASSESSMENT — ENCOUNTER SYMPTOMS
LOSS OF SENSATION IN FEET: 0
DEPRESSION: 0
OCCASIONAL FEELINGS OF UNSTEADINESS: 0

## 2025-05-13 ASSESSMENT — ACTIVITIES OF DAILY LIVING (ADL)
TAKING_MEDICATION: INDEPENDENT
MANAGING_FINANCES: INDEPENDENT
DRESSING: INDEPENDENT
DOING_HOUSEWORK: INDEPENDENT
GROCERY_SHOPPING: INDEPENDENT
BATHING: INDEPENDENT

## 2025-05-13 ASSESSMENT — LIFESTYLE VARIABLES
HOW OFTEN DO YOU HAVE A DRINK CONTAINING ALCOHOL: MONTHLY OR LESS
SKIP TO QUESTIONS 9-10: 1
HOW OFTEN DO YOU HAVE SIX OR MORE DRINKS ON ONE OCCASION: NEVER
HOW MANY STANDARD DRINKS CONTAINING ALCOHOL DO YOU HAVE ON A TYPICAL DAY: 1 OR 2
AUDIT-C TOTAL SCORE: 1

## 2025-05-13 NOTE — ASSESSMENT & PLAN NOTE
Medicare wellness exam is completed   Orders:    1 Year Follow Up In Primary Care - Wellness Exam; Future    CBC and Auto Differential; Future    Comprehensive Metabolic Panel; Future

## 2025-05-13 NOTE — PROGRESS NOTES
"Subjective   Reason for Visit: Suyapa Thao is an 54 y.o. female here for a Medicare Wellness visit.     Past Medical, Surgical, and Family History reviewed and updated in chart.    Reviewed all medications by prescribing practitioner or clinical pharmacist (such as prescriptions, OTCs, herbal therapies and supplements) and documented in the medical record.    HPI    Follow up and Medicare wellness exam:     Hyperglycemia: patient had 3 hour GTT, glucose dropped to 55 one hour after glucose, 62 2 hours after glucose administration. Patient saw endocrinology at [facility].  She gets occasional episodes of energy loss, patient states that she misunderstood that endocrinology wanted her to check glucoses when she was symptomatic. Patient states that she will not return to [facility] endocrinologist again, patient states that she has a dry mouth, she is \"always thirsty\". Patient takes risperidone, prozosin, quetiapine clonazepam.      vocal cord paralysis: patient had polyps removed, she had vocal cord injection per [facility]. She still has a hoarse voice, she has had injection x 3, she has seen Dr Saunders who offered fat injection to improve closing of vocal cords. she also saw [facility] where surgery was also discussed with the patient.  Patient had procedure, she quit smoking for 33 days, then she resumed      Patient has chronic neck pain, she has gone to [facility] for evaluation of her neck, patient notes that pain radiates to the neck. The patient had an injection that was helpful per pain management      PTSD: patient worked at [employer] as . She was active in [place], [place]. She does see psychiatry, she is disabled due to PTSD symptoms. [place] to [place] after [place].  Patient takes risperidone, prozosin, quetiapine clonazepam     Numbness and pain in the feet and hands: patient has severe pain in the feet, numbness in the hands is noted also . She states that she rarely goes to physician except for current disability issues, She " "does go to pain management also. Patient gets pain in the knees and the elbows. The patient has tingling most of the time, she has developed some memory issues, patient did discuss with psychiatry, she feels that the current meds and diagnoses may be contributing to issues       Smoking: patient is trying to quit smoking, she now smoking about 7 cigarettes per day, she has smoked since age 18, \"it is always a struggle\"     Adenomyosis of uterus: patient has been offered hysterectomy and other treatments per [facility] ,  patient had bladder sling placed by urogynecology, she is doing better now        Patient Care Team:  Brennan Kinney MD as PCP - General (Internal Medicine)  Brennan Kinney MD as PCP - [employer] PCP     Review of Systems     otherwise negative aside from what was mentioned above in HPI.     Objective   Vitals:  /82 (BP Location: Right arm, Patient Position: Sitting, BP Cuff Size: Adult)   Pulse 66   Temp 36.6 °C (97.9 °F) (Temporal)   Resp 18   Ht 1.6 m (5' 3\")   Wt 63.4 kg (139 lb 11.2 oz)   SpO2 98%   BMI 24.75 kg/m²       Physical Exam    Constitutional   General appearance: Alert and in no acute distress. well developed, well nourished, within normal limits of ideal weight  Eyes   Inspection of eyes: Sclera and conjunctiva were normal. wearing glasses.   Ears, Nose, Mouth, and Throat   Ears: Auricles: Normal. No thyromegaly or neck masses are noted  Pulmonary   Respiratory assessment: No respiratory distress, normal respiratory rhythm and effort.    Auscultation of lungs: Auscultation of the lungs revealed decreased breath sounds diffusely. no rales or crackles were heard bilaterally. no rhonchi. A few rare end inspiratory wheezes were noted only  Cardiovascular   Auscultation of heart: Apical pulse normal, heart rate and rhythm normal, normal S1 and S2, no murmurs and no pericardial rub.  No carotid bruits, no peripheral  edema  Lymphatic   Palpation of lymph " nodes in neck: No cervical lymphadenopathy.   Musculoskeletal right lower lumbar tenderness noted, hand grasp is 4/5 bilaterally, patient c/o numbness of the fingers.   Neurologic   Cranial nerves: Nerves 2-12 were intact, no focal neuro defects.  she has subjective numbness of the fingers, she was to have a cervical fusion at [facility], she has decided not to do this    Psychiatric   Orientation: Oriented to person, place, and time.    Mood and affect: patient is quiet today.        Assessment & Plan  Routine general medical examination at [facility] care facility  Medicare wellness exam is completed   Orders:    1 Year Follow Up In Primary Care - Wellness Exam; Future    CBC and Auto Differential; Future    Comprehensive Metabolic Panel; Future    Hyperlipidemia, unspecified hyperlipidemia type  Check labs , patient has not had labs checked for some time  Orders:    CBC and Auto Differential; Future    Comprehensive Metabolic Panel; Future    Lipid Panel; Future    Complete paralysis of left vocal cord    Orders:    CBC and Auto Differential; Future    Comprehensive Metabolic Panel; Future    Prediabetes  Check labs as ordered  Orders:    Albumin-Creatinine Ratio, Urine Random; Future    CBC and Auto Differential; Future    Comprehensive Metabolic Panel; Future    Hemoglobin A1C; Future    PTSD (post-traumatic stress disorder)  Some psychiatric meds may cause some dry mouth and hyperglycemia also, check labs   Orders:    CBC and Auto Differential; Future    Comprehensive Metabolic Panel; Future    Trauma to vocal cord, sequela    Orders:    CBC and Auto Differential; Future    Comprehensive Metabolic Panel; Future    Smoking addiction    Orders:    CBC and Auto Differential; Future    Comprehensive Metabolic Panel; Future    Chronic obstructive pulmonary disease, unspecified COPD type (Multi)  Encourage smoking cessation  Orders:    CBC and Auto Differential; Future    Comprehensive Metabolic Panel;  Future    Idiopathic peripheral neuropathy  Check labs including HgbA1C, B12 , TSH  Orders:    CBC and Auto Differential; Future    Comprehensive Metabolic Panel; Future    TSH with reflex to Free T4 if abnormal; Future    Vitamin B12; Future    Magnesium; Future    Perimenopause    Orders:    Vitamin D 25-Hydroxy,Total (for eval of Vitamin D levels); Future    Vitamin D deficiency, unspecified  Not certain if patient is vitamin d deficient, check labs  Orders:    Vitamin D 25-Hydroxy,Total (for eval of Vitamin D levels); Future     Check labs as ordered, follow up after testing  Medicare wellness exam completed

## 2025-05-13 NOTE — ASSESSMENT & PLAN NOTE
Check labs , patient has not had labs checked for some time  Orders:    CBC and Auto Differential; Future    Comprehensive Metabolic Panel; Future    Lipid Panel; Future

## 2025-05-13 NOTE — ASSESSMENT & PLAN NOTE
Check labs as ordered  Orders:    Albumin-Creatinine Ratio, Urine Random; Future    CBC and Auto Differential; Future    Comprehensive Metabolic Panel; Future    Hemoglobin A1C; Future

## 2025-05-13 NOTE — ASSESSMENT & PLAN NOTE
Some psychiatric meds may cause some dry mouth and hyperglycemia also, check labs   Orders:    CBC and Auto Differential; Future    Comprehensive Metabolic Panel; Future

## 2025-05-17 ENCOUNTER — HOSPITAL ENCOUNTER (OUTPATIENT)
Dept: RADIOLOGY | Facility: HOSPITAL | Age: 55
Discharge: HOME | End: 2025-05-17
Payer: MEDICARE

## 2025-05-17 VITALS — HEIGHT: 63 IN | BODY MASS INDEX: 24.63 KG/M2 | WEIGHT: 139 LBS

## 2025-05-17 DIAGNOSIS — Z12.31 ENCOUNTER FOR SCREENING MAMMOGRAM FOR MALIGNANT NEOPLASM OF BREAST: ICD-10-CM

## 2025-05-17 PROCEDURE — 77067 SCR MAMMO BI INCL CAD: CPT

## 2025-05-17 PROCEDURE — 77063 BREAST TOMOSYNTHESIS BI: CPT | Performed by: RADIOLOGY

## 2025-05-17 PROCEDURE — 77067 SCR MAMMO BI INCL CAD: CPT | Performed by: RADIOLOGY

## 2025-05-19 DIAGNOSIS — F32.2 CURRENT SEVERE EPISODE OF MAJOR DEPRESSIVE DISORDER WITHOUT PSYCHOTIC FEATURES WITHOUT PRIOR EPISODE (MULTI): Primary | ICD-10-CM

## 2025-05-19 RX ORDER — EPINEPHRINE 0.3 MG/.3ML
0.3 INJECTION SUBCUTANEOUS EVERY 5 MIN PRN
OUTPATIENT
Start: 2025-05-28

## 2025-05-19 RX ORDER — ACETAMINOPHEN 325 MG/1
975 TABLET ORAL ONCE AS NEEDED
OUTPATIENT
Start: 2025-05-28

## 2025-05-19 RX ORDER — FAMOTIDINE 10 MG/ML
20 INJECTION, SOLUTION INTRAVENOUS ONCE AS NEEDED
OUTPATIENT
Start: 2025-05-28

## 2025-05-19 RX ORDER — LABETALOL 200 MG/1
100 TABLET, FILM COATED ORAL AS NEEDED
OUTPATIENT
Start: 2025-05-28

## 2025-05-19 RX ORDER — DIPHENHYDRAMINE HYDROCHLORIDE 50 MG/ML
50 INJECTION, SOLUTION INTRAMUSCULAR; INTRAVENOUS AS NEEDED
OUTPATIENT
Start: 2025-05-28

## 2025-05-19 RX ORDER — ONDANSETRON 4 MG/1
4 TABLET, FILM COATED ORAL ONCE AS NEEDED
OUTPATIENT
Start: 2025-05-28

## 2025-05-19 RX ORDER — DIPHENHYDRAMINE HCL 50 MG
50 CAPSULE ORAL ONCE AS NEEDED
OUTPATIENT
Start: 2025-05-28

## 2025-05-19 RX ORDER — ALBUTEROL SULFATE 0.83 MG/ML
3 SOLUTION RESPIRATORY (INHALATION) AS NEEDED
OUTPATIENT
Start: 2025-05-28

## 2025-05-22 ENCOUNTER — APPOINTMENT (OUTPATIENT)
Dept: SPEECH THERAPY | Facility: CLINIC | Age: 55
End: 2025-05-22
Payer: MEDICARE

## 2025-05-27 ENCOUNTER — APPOINTMENT (OUTPATIENT)
Dept: BEHAVIORAL HEALTH | Facility: CLINIC | Age: 55
End: 2025-05-27
Payer: MEDICARE

## 2025-05-27 VITALS
BODY MASS INDEX: 25.69 KG/M2 | DIASTOLIC BLOOD PRESSURE: 67 MMHG | RESPIRATION RATE: 16 BRPM | HEART RATE: 98 BPM | HEIGHT: 63 IN | WEIGHT: 145 LBS | TEMPERATURE: 98.2 F | SYSTOLIC BLOOD PRESSURE: 104 MMHG

## 2025-05-27 DIAGNOSIS — F41.1 GAD (GENERALIZED ANXIETY DISORDER): Primary | ICD-10-CM

## 2025-05-27 DIAGNOSIS — F41.0 PANIC DISORDER: ICD-10-CM

## 2025-05-27 DIAGNOSIS — F43.10 PTSD (POST-TRAUMATIC STRESS DISORDER): ICD-10-CM

## 2025-05-27 DIAGNOSIS — F32.2 CURRENT SEVERE EPISODE OF MAJOR DEPRESSIVE DISORDER WITHOUT PSYCHOTIC FEATURES WITHOUT PRIOR EPISODE (MULTI): ICD-10-CM

## 2025-05-27 PROCEDURE — 4004F PT TOBACCO SCREEN RCVD TLK: CPT | Performed by: PSYCHIATRY & NEUROLOGY

## 2025-05-27 PROCEDURE — G2212 PROLONG OUTPT/OFFICE VIS: HCPCS | Performed by: PSYCHIATRY & NEUROLOGY

## 2025-05-27 PROCEDURE — 3008F BODY MASS INDEX DOCD: CPT | Performed by: PSYCHIATRY & NEUROLOGY

## 2025-05-27 PROCEDURE — 99215 OFFICE O/P EST HI 40 MIN: CPT | Performed by: PSYCHIATRY & NEUROLOGY

## 2025-05-27 RX ORDER — CLONAZEPAM 1 MG/1
1 TABLET ORAL NIGHTLY
Qty: 90 TABLET | Refills: 0 | Status: SHIPPED | OUTPATIENT
Start: 2025-05-27 | End: 2025-08-25

## 2025-05-27 RX ORDER — ALPRAZOLAM 1 MG/1
1 TABLET, ORALLY DISINTEGRATING ORAL 2 TIMES DAILY PRN
Qty: 60 TABLET | Refills: 0 | Status: SHIPPED | OUTPATIENT
Start: 2025-05-27 | End: 2025-06-26

## 2025-05-27 ASSESSMENT — COLUMBIA-SUICIDE SEVERITY RATING SCALE - C-SSRS
2. HAVE YOU ACTUALLY HAD ANY THOUGHTS OF KILLING YOURSELF?: NO
ATTEMPT SINCE LAST CONTACT: NO
TOTAL  NUMBER OF ABORTED OR SELF INTERRUPTED ATTEMPTS SINCE LAST CONTACT: NO
1. SINCE LAST CONTACT, HAVE YOU WISHED YOU WERE DEAD OR WISHED YOU COULD GO TO SLEEP AND NOT WAKE UP?: NO
6. HAVE YOU EVER DONE ANYTHING, STARTED TO DO ANYTHING, OR PREPARED TO DO ANYTHING TO END YOUR LIFE?: NO
SUICIDE, SINCE LAST CONTACT: NO
TOTAL  NUMBER OF INTERRUPTED ATTEMPTS SINCE LAST CONTACT: NO

## 2025-05-27 NOTE — PROGRESS NOTES
Follow-up visit  Started at 11:40 am, ended 12:50 PM  Documentation from 12:50 pm to 1:10 PM   Total time =90    Subjective: she said she felt the same as last time. Her schedule was virtual initially, she came in in-person because [employer] wanted her to do urine drug screen test.  She said her depression did not get any better because a lot of things happened in the world, related to the wars. Felt hopeless or helpless almost of day now. Still felt guilt of being in the war zone and seeing so many people . Denied having SI/HI/AVH or paranoia. Denied having SI/HI/AVH or paranoia. Felt safe at home, but did not want to be in the public. She said she felt safe at home because she has cameras and secured doors. She feared that something negative might happen when she was in the public places. Still felt lack of motivation and pleasure. Still had flashback daily and nightmares more than half of the time. Energy was low. Concentration and memory were poor. Still felt irritable with yelling and cursing. Appetite varied, gained some weight. Did not bother him with weight gain. Felt anxious all the time and had panic attack daily.   No side effect from medications  No change in medical hx     Objective:   Appearance: well-groomed.   Demeanor: average.   Eye Contact: average.   Motor Activity: average.   Speech: clear.   Language: Neurologic language is intact.   Fund of Knowledge: intact fund of knowledge.   Delusions: None Reported.   Hallucinations: not reported  Self Harm: None Reported.   Aggressive: None Reported.   Mood: depressed and anxious.   Affect: restricted most of the time. Was tearful when she was talking about a younger girl who  on her lap when she was in war zone.   Orientation: alert, oriented x3.   Manner: cooperative.   Thought process: goal-directed.   Thought association: displays rational thought process.   Content of thought: normal  Abstract/ Rational Thought: intact   Memory: grossly  intact.   Behavior: normal motor activity, relaxed, good eye contact, calm.   Attention/Concentration: normal.   Cognition: intact.   Intelligence Estimate: average.   Executive Function: intact.   Insight: intact.   Judgement: intact.   Musculoskeletal: normal strength and tone. Normal gait. No abnormal movement.   Impression: major depressive disorder, severe, chronic   LOLI - severe   PTSD - severe, chronic   Will start esketamine treatment as planned  Will add Xanax 1 mg twice a day during day time for severe anxiety and panic attack and continue Klonopin 1 mg at bedtime for sleep. The risk for long-term use of benzos was explained to her.  She fully understood. She wanted to try Xanax to relieve chest pressure.  Continue Lexapro 20 mg qam  Continue quetiapine 200 mg at bedtime and Risperdal 5 mg per day  Continue Prazosin 5 mg bid   Urine drug screen ordered by [employer]  Urine sample was collected   Continue therapy with her therapist. EMDR might be helpful.  Exercise to re-tune her cardiovascular system was discussed. She fully understood. She was interested in try Yoga because she had back pain and neck pain.   The possible of mechanism for her anxiety, panic attack and PTSD were explained. She fully understood.   Follow-up in 1-2 months.   Wesley Dunn MD.

## 2025-06-02 PROBLEM — Z01.419 WELL WOMAN EXAM WITH ROUTINE GYNECOLOGICAL EXAM: Status: ACTIVE | Noted: 2025-06-02

## 2025-06-03 ENCOUNTER — APPOINTMENT (OUTPATIENT)
Dept: OBSTETRICS AND GYNECOLOGY | Facility: CLINIC | Age: 55
End: 2025-06-03
Payer: OTHER MISCELLANEOUS

## 2025-06-03 ENCOUNTER — TELEPHONE (OUTPATIENT)
Facility: HOSPITAL | Age: 55
End: 2025-06-03

## 2025-06-03 VITALS — WEIGHT: 138.8 LBS | SYSTOLIC BLOOD PRESSURE: 118 MMHG | BODY MASS INDEX: 24.59 KG/M2 | DIASTOLIC BLOOD PRESSURE: 70 MMHG

## 2025-06-03 DIAGNOSIS — R22.33 AXILLARY MASS, BILATERAL: ICD-10-CM

## 2025-06-03 DIAGNOSIS — Z01.411 ENCOUNTER FOR WELL WOMAN EXAM WITH ABNORMAL FINDINGS: ICD-10-CM

## 2025-06-03 DIAGNOSIS — D18.1 LYMPHANGIOMA, ANY SITE: ICD-10-CM

## 2025-06-03 DIAGNOSIS — A60.09 HERPES GENITALIS IN WOMEN: ICD-10-CM

## 2025-06-03 DIAGNOSIS — N80.03 ADENOMYOSIS OF THE UTERUS: Primary | ICD-10-CM

## 2025-06-03 DIAGNOSIS — N89.8 VAGINAL DRYNESS: ICD-10-CM

## 2025-06-03 DIAGNOSIS — N92.1 MENORRHAGIA WITH IRREGULAR CYCLE: ICD-10-CM

## 2025-06-03 DIAGNOSIS — N63.32 UNSPECIFIED LUMP IN AXILLARY TAIL OF THE LEFT BREAST: ICD-10-CM

## 2025-06-03 DIAGNOSIS — N95.2 VAGINAL ATROPHY: ICD-10-CM

## 2025-06-03 PROCEDURE — 99396 PREV VISIT EST AGE 40-64: CPT | Performed by: OBSTETRICS & GYNECOLOGY

## 2025-06-03 PROCEDURE — 99214 OFFICE O/P EST MOD 30 MIN: CPT | Performed by: OBSTETRICS & GYNECOLOGY

## 2025-06-03 PROCEDURE — 58100 BIOPSY OF UTERUS LINING: CPT | Performed by: OBSTETRICS & GYNECOLOGY

## 2025-06-03 PROCEDURE — 4004F PT TOBACCO SCREEN RCVD TLK: CPT | Performed by: OBSTETRICS & GYNECOLOGY

## 2025-06-03 RX ORDER — ESTRADIOL 0.1 MG/G
2 CREAM VAGINAL DAILY
Qty: 42.5 G | Refills: 12 | Status: SHIPPED | OUTPATIENT
Start: 2025-06-03 | End: 2026-06-03

## 2025-06-03 RX ORDER — ESTRADIOL 0.1 MG/G
2 CREAM VAGINAL DAILY
Qty: 42.5 G | Refills: 12 | Status: SHIPPED | OUTPATIENT
Start: 2025-06-03 | End: 2025-06-03

## 2025-06-03 ASSESSMENT — ENCOUNTER SYMPTOMS
CONSTIPATION: 0
COUGH: 0
DIZZINESS: 0
DIARRHEA: 0
HEMATURIA: 0
JOINT SWELLING: 0
AGITATION: 0
APNEA: 0
ACTIVITY CHANGE: 0

## 2025-06-03 NOTE — ASSESSMENT & PLAN NOTE
Endometrial biopsy is sent today.   Follow up MRI is ordered to follow adenomyosis.  FSH and estradiol are ordered to assess for menopause.   She remains hopeful to avoid surgery.

## 2025-06-03 NOTE — PROGRESS NOTES
Received auth for patients treatment for Spravato through the patients medical benefit.   Auth is for  and G 2083 and diagnosis code F32.2 and runs from [employer] 3, 2025-September 3, 2025  Patient was called and advised that this is not going to go through [employer] rather it will go through [employer].

## 2025-06-03 NOTE — PROGRESS NOTES
Patient ID: Suyapa Thao is a 54 y.o. female.    Endometrial biopsy    Date/Time: 6/3/2025 11:47 AM    Performed by: Imelda Graham MD  Authorized by: Imelda Graham MD    Consent:     Consent obtained: written    Consent given by: patient    Risks discussed: bleeding, infection and pain    Alternatives discussed: observation  Indications:     Indications: abnormal uterine bleeding    Procedure:     A bimanual exam was performed: yes      Uterus size: 6-8 weeks    Uterus position: anteverted    Prepped with: Betadine    Tenaculum used: yes      A local block was performed: no      Cervix dilated: no      Number of passes: 2  Findings:     Cervix: normal      Uterus depth by sound (cm): 9    Specimen collected: low volume sample collected      Patient tolerance: tolerated well, no immediate complications  Subjective   Patient ID: Suyapa Thao is a 54 y.o. female who presents for Cyst pain  (Feeling like cramping, but much worse, hurts to sit or stand).  She presents for gyn visit. Annual exam is due at this time.   Mammogram returned negative last chandrika. Pap and HPV were negative in 2023.  Review of the EMR shows her history is significant for vocal cord paralysis, PTSD, chronic back and neck pain.     Last visit with me was on 8/21/2024. At that time she had been found to likely have adenomyosis of the uterus on prior imaging and was using progestin only OCP for menstrual control as she transitioned into menopause. MRI in 2023 suggested tubular cystic changes in bilateral vulva, most suggestive of prominent vascular lymphatics, potentially lymphangioma/lymphatic malformation. MRI also suggested possibility of splaying of uterine horns and showed small ovaries without abnormality. MRI in July 2024 again showed adenomyosis, measured uterus at 8 x 4.6 x 8.5 cm, and suggested presence of several very small fibroids. Stable perineal likely vascular malformation/lymphangioma was also noted on this follow up  MRI.  She planned to avoid hysterectomy since bleeding was manageable and labs showed trend towards menopause.     Since last visit she had a sling placed by Dr. Nolasco for stress incontinence of urine.     She states she has random bleeding now. This is very light and can last many days. The timing is sporadic. She continues to take progestin only OCP. She also has cramping at times. The pain can be more sharp and stabbing than prior menstrual cramps. She is very interested in follow up testing to confirm things are stable. She desires endometrial biopsy today and she desires MRI again to confirm stable appearance to the adenomyosis. We  also discussed proceeding with FSH and estradiol to assess for menopause.   She did not find vaginal estrogen tablets to be helpful and she has stopped using this. After discussion she desires to try vaginal estrogen cream for atrophy.          Review of Systems   Constitutional:  Negative for activity change.   HENT:  Negative for congestion.    Respiratory:  Negative for apnea and cough.    Cardiovascular:  Negative for chest pain.   Gastrointestinal:  Negative for constipation and diarrhea.   Genitourinary:  Negative for hematuria and vaginal pain.   Musculoskeletal:  Negative for joint swelling.   Neurological:  Negative for dizziness.   Psychiatric/Behavioral:  Negative for agitation.        Medical History[1]   Surgical History[2]   RX Allergies[3]   Medications Ordered Prior to Encounter[4]     Objective   Physical Exam  Constitutional:       Appearance: Normal appearance.   Neck:      Thyroid: No thyromegaly.   Cardiovascular:      Rate and Rhythm: Normal rate and regular rhythm.      Heart sounds: Normal heart sounds.   Pulmonary:      Effort: Pulmonary effort is normal.      Breath sounds: Normal breath sounds.   Chest:      Chest wall: No mass.   Breasts:     Right: Normal. No inverted nipple, mass, nipple discharge or skin change.      Left: Normal. No inverted nipple,  mass, nipple discharge or skin change.      Comments: Bilateral mobile mass in axilla.  Abdominal:      General: There is no distension.      Palpations: Abdomen is soft. There is no mass.      Tenderness: There is no abdominal tenderness.   Genitourinary:     General: Normal vulva.      Exam position: Lithotomy position.      Labia:         Right: No rash.         Left: No rash.       Urethra: No urethral lesion.      Vagina: Normal. No lesions.      Cervix: No friability or lesion.      Uterus: Normal. Not enlarged and not tender.       Adnexa: Right adnexa normal and left adnexa normal.        Right: No mass or tenderness.          Left: No mass or tenderness.     Musculoskeletal:         General: No deformity.      Cervical back: Neck supple.   Lymphadenopathy:      Cervical: No cervical adenopathy.   Skin:     General: Skin is warm and dry.      Findings: No rash.   Neurological:      General: No focal deficit present.      Mental Status: She is alert.   Psychiatric:         Mood and Affect: Mood normal.         Behavior: Behavior is cooperative.         Thought Content: Thought content normal.           Problem List Items Addressed This Visit          Medium    Adenomyosis of the uterus - Primary    Overview   Pelvic ultrasound in August 2023 had suggested uterine fibroid. MRI 9/15/2023 revealed uterine adenomyosis with normal appearing endometrium, no evidence of fibroid and no adnexal abnormality.    7/2024 MRI measured uterus at 8 x 4.6 x 8.5 cm and demonstrated adenomyosis and evidence of several very small fibroids.  Menses are heavy and with cramps. She has had some improvement with progestin only OCP.  Endometrial biopsy returned benign with fragment of polyp. FSH and estradiol are trending  towards menopause.   We discussed management options of observation, continuing progestin only pills, progestin releasing IUD and hysterectomy.          Relevant Orders    MR pelvis w and wo IV contrast    Axillary  mass, bilateral    Overview   She has had lymph node removed from each axilla and feels this is now returned.  Mobile mass is noted in each axilla. USN is ordered.         Relevant Orders    BI US breast limited bilateral    Encounter for well woman exam with abnormal findings    Overview   8/22/2023 pap and HPV returned negative.   Suburethral sling was placed by Dr. Nolasco for urinary stress incontinence on 2/28/2025.         Current Assessment & Plan   Pap is up to date.   Mammogram is due in May 2026.  Regular exercise and attaining/maintaining a healthy weight is encouraged.   Adequate calcium intake with diet or supplements is encouraged.    We will notify of any abnormal results.          Herpes genitalis in women    Overview   Herpes diagnosis was given many years ago. She declines suppressive therapy, preferring to take valacyclovir with outbreaks.          Lymphangioma, any site    Overview   MRI 9/2023 and 7/2024 noted multiple thin walled fluid signal intensity nonenhancing tubular cystic changes in bilateral vulva, most suggestive of prominent vascular lymphatics, potentially lymphangioma/lymphatic malformation.         Relevant Orders    MR pelvis w and wo IV contrast    Menorrhagia with irregular cycle    Overview   Heavy menses. Imaging shows evidence of adenomyosis.  Endometrial biopsy 3/28/2024 returned with fragment of endometrial polyp in background of menstrual endometrium. A polyp was not visualized on prior MRI or ultrasound so this is likely very small.  We discussed management options of observation, continuing progestin only pills, progestin releasing IUD and hysterectomy. She elected to continue progestin only pills.   TSH returned in normal range. FSH and estradiol are trending toward menopause.   She desires to continue with progestin only OCP and declines surgical intervention.  Lysteda is prescribed to take if menses are heavy, but she no longer has heavy bleeding.         Current  Assessment & Plan   Endometrial biopsy is sent today.   Follow up MRI is ordered to follow adenomyosis.  FSH and estradiol are ordered to assess for menopause.   She remains hopeful to avoid surgery.         Relevant Orders    Surgical Pathology Exam    Vaginal atrophy    Overview   Vagifem is prescribed for vaginal dryness. She is also using a lubricant.   She did not find this helpful. Will change to estradiol cream.         Relevant Medications    estradiol (Estrace) 0.01 % (0.1 mg/gram) vaginal cream    Other Relevant Orders    Follicle Stimulating Hormone    Estradiol    Vaginal dryness    Overview   Vagifem vaginal estrogen was prescribed for vulvovaginal atrophy. Intrarosa was added in August 2023 by Dr. Ontiveros due to continued dryness.   6/3/2025 she elects to try estradiol vaginal cream.         Relevant Medications    estradiol (Estrace) 0.01 % (0.1 mg/gram) vaginal cream    Other Relevant Orders    Follicle Stimulating Hormone    Estradiol     Other Visit Diagnoses         Unspecified lump in axillary tail of the left breast        Relevant Orders    [facility] breast limited bilateral                         [1]   Past Medical History:  Diagnosis Date    ADHD (attention deficit hyperactivity disorder)     Adjustment disorder     Anxiety     Cervical disc disease 2014    Lower back and neck pain. My Dr. Daniele Elder at  has my records    Chronic pain disorder     COPD (chronic obstructive pulmonary disease) (Multi)     Depression     Hallucination     Hyperlipidemia     Memory loss     Panic attack     Post-traumatic stress disorder, unspecified 08/02/2022    PTSD (post-traumatic stress disorder)    Psychiatric illness     PTSD (post-traumatic stress disorder)     Sleep difficulties    [2]   Past Surgical History:  Procedure Laterality Date    AUGMENTATION MAMMAPLASTY  1995    INCONTINENCE SURGERY      OTHER SURGICAL HISTORY  03/29/2022    Vocal Cordectomy    OTHER SURGICAL HISTORY  03/29/2022    Breast  surgery/ augmentation   [3] No Known Allergies  [4]   Current Outpatient Medications on File Prior to Visit   Medication Sig Dispense Refill    acetaminophen (Tylenol) 500 mg tablet Take 2 tablets (1,000 mg) by mouth every 6 hours if needed for mild pain (1 - 3). 30 tablet 0    albuterol 90 mcg/actuation inhaler INHALE 2 PUFFS BY MOUTH EVERY 4 HOURS AS NEEDED FOR WHEEZING OR SHORTNESS OF BREATH 8.5 g 2    ALPRAZolam (Niravam) 1 mg disintegrating tablet Dissolve 1 tablet (1 mg) in the mouth 2 times a day as needed for anxiety. 60 tablet 0    clonazePAM (KlonoPIN) 1 mg tablet Take 1 tablet (1 mg) by mouth once daily at bedtime. 90 tablet 0    escitalopram (Lexapro) 20 mg tablet Take 1 tablet (20 mg) by mouth once daily. 90 tablet 3    Incassia 0.35 mg tablet TAKE 1 TABLET DAILY 84 tablet 3    ospemifene (Osphena) 60 mg tablet Take 1 tablet by mouth once daily.      prazosin (Minipress) 5 mg capsule Take 1 capsule (5 mg) by mouth 2 times a day. For nightmares 180 capsule 3    QUEtiapine (SEROquel) 200 mg tablet Take 1 tablet (200 mg) by mouth once daily at bedtime. 90 tablet 3    risperiDONE (RisperDAL) 1 mg tablet Take 1 tablet (1 mg) by mouth 2 times a day. 180 tablet 1    risperiDONE (RisperDAL) 4 mg tablet Take 1 tablet (4 mg) by mouth once daily. Take 1 tablet  (2 mg) at bedtime, and 1/2 tablet (1 mg) in the morning. Thanks. 90 tablet 3    [DISCONTINUED] estradiol (Vagifem) 10 mcg tablet vaginal tablet Insert 1 tablet (10 mcg) into the vagina 2 times a week. 24 tablet 3    ketorolac (Toradol) 10 mg tablet Take 1 tablet (10 mg) by mouth every 6 hours if needed for moderate pain (4 - 6). (Patient not taking: Reported on 6/3/2025) 20 tablet 0    prasterone, dhea, (Intrarosa) 6.5 mg insert Insert 6.5 mg into the vagina once daily. (Patient not taking: Reported on 6/3/2025) 30 each 3    rosuvastatin (Crestor) 5 mg tablet TAKE 1 TABLET(5 MG) BY MOUTH DAILY (Patient not taking: Reported on 6/3/2025) 90 tablet 1     traMADol (Ultram) 50 mg tablet Take 1 tablet (50 mg) by mouth every 6 hours if needed for severe pain (7 - 10). (Patient not taking: Reported on 6/3/2025) 15 tablet 0     No current facility-administered medications on file prior to visit.

## 2025-06-03 NOTE — ASSESSMENT & PLAN NOTE
Pap is up to date.   Mammogram is due in May 2026.  Regular exercise and attaining/maintaining a healthy weight is encouraged.   Adequate calcium intake with diet or supplements is encouraged.    We will notify of any abnormal results.

## 2025-06-04 LAB
ESTRADIOL SERPL-MCNC: 176 PG/ML
FSH SERPL-ACNC: 21.7 MIU/ML

## 2025-06-06 ENCOUNTER — TELEPHONE (OUTPATIENT)
Dept: OBSTETRICS AND GYNECOLOGY | Facility: CLINIC | Age: 55
End: 2025-06-06

## 2025-06-06 ENCOUNTER — HOSPITAL ENCOUNTER (OUTPATIENT)
Facility: HOSPITAL | Age: 55
Discharge: HOME | End: 2025-06-06
Payer: MEDICARE

## 2025-06-06 VITALS
HEART RATE: 81 BPM | OXYGEN SATURATION: 95 % | RESPIRATION RATE: 18 BRPM | TEMPERATURE: 96.3 F | SYSTOLIC BLOOD PRESSURE: 108 MMHG | DIASTOLIC BLOOD PRESSURE: 75 MMHG

## 2025-06-06 DIAGNOSIS — F32.2 CURRENT SEVERE EPISODE OF MAJOR DEPRESSIVE DISORDER WITHOUT PSYCHOTIC FEATURES WITHOUT PRIOR EPISODE (MULTI): Primary | ICD-10-CM

## 2025-06-06 RX ORDER — DIPHENHYDRAMINE HYDROCHLORIDE 50 MG/ML
50 INJECTION, SOLUTION INTRAMUSCULAR; INTRAVENOUS AS NEEDED
OUTPATIENT
Start: 2025-06-08

## 2025-06-06 RX ORDER — EPINEPHRINE 1 MG/ML
0.3 INJECTION, SOLUTION, CONCENTRATE INTRAVENOUS EVERY 5 MIN PRN
OUTPATIENT
Start: 2025-06-08

## 2025-06-06 RX ORDER — DIPHENHYDRAMINE HCL 50 MG
50 CAPSULE ORAL ONCE AS NEEDED
OUTPATIENT
Start: 2025-06-08

## 2025-06-06 RX ORDER — TRAZODONE HYDROCHLORIDE 50 MG/1
50 TABLET ORAL NIGHTLY
Qty: 30 TABLET | Refills: 0 | Status: SHIPPED | OUTPATIENT
Start: 2025-06-06 | End: 2025-07-06

## 2025-06-06 RX ORDER — LABETALOL 100 MG/1
100 TABLET, FILM COATED ORAL AS NEEDED
OUTPATIENT
Start: 2025-06-08

## 2025-06-06 RX ORDER — FAMOTIDINE 10 MG/ML
20 INJECTION, SOLUTION INTRAVENOUS ONCE AS NEEDED
OUTPATIENT
Start: 2025-06-08

## 2025-06-06 RX ORDER — ALBUTEROL SULFATE 0.83 MG/ML
3 SOLUTION RESPIRATORY (INHALATION) AS NEEDED
OUTPATIENT
Start: 2025-06-08

## 2025-06-06 RX ORDER — ACETAMINOPHEN 325 MG/1
975 TABLET ORAL ONCE AS NEEDED
OUTPATIENT
Start: 2025-06-08

## 2025-06-06 RX ORDER — ONDANSETRON 4 MG/1
4 TABLET, FILM COATED ORAL ONCE AS NEEDED
OUTPATIENT
Start: 2025-06-08

## 2025-06-06 ASSESSMENT — ANXIETY QUESTIONNAIRES
4. TROUBLE RELAXING: NEARLY EVERY DAY
3. WORRYING TOO MUCH ABOUT DIFFERENT THINGS: NEARLY EVERY DAY
1. FEELING NERVOUS, ANXIOUS, OR ON EDGE: NEARLY EVERY DAY
4. TROUBLE RELAXING: NEARLY EVERY DAY
6. BECOMING EASILY ANNOYED OR IRRITABLE: NEARLY EVERY DAY
6. BECOMING EASILY ANNOYED OR IRRITABLE: NEARLY EVERY DAY
5. BEING SO RESTLESS THAT IT IS HARD TO SIT STILL: NEARLY EVERY DAY
1. FEELING NERVOUS, ANXIOUS, OR ON EDGE: NEARLY EVERY DAY
5. BEING SO RESTLESS THAT IT IS HARD TO SIT STILL: NEARLY EVERY DAY
3. WORRYING TOO MUCH ABOUT DIFFERENT THINGS: NEARLY EVERY DAY
GAD7 TOTAL SCORE: 21
7. FEELING AFRAID AS IF SOMETHING AWFUL MIGHT HAPPEN: NEARLY EVERY DAY
IF YOU CHECKED OFF ANY PROBLEMS ON THIS QUESTIONNAIRE, HOW DIFFICULT HAVE THESE PROBLEMS MADE IT FOR YOU TO DO YOUR WORK, TAKE CARE OF THINGS AT HOME, OR GET ALONG WITH OTHER PEOPLE: VERY DIFFICULT
2. NOT BEING ABLE TO STOP OR CONTROL WORRYING: NEARLY EVERY DAY
2. NOT BEING ABLE TO STOP OR CONTROL WORRYING: NEARLY EVERY DAY

## 2025-06-06 ASSESSMENT — PATIENT HEALTH QUESTIONNAIRE - PHQ9
5. POOR APPETITE OR OVEREATING: NEARLY EVERY DAY
4. FEELING TIRED OR HAVING LITTLE ENERGY: NEARLY EVERY DAY
3. TROUBLE FALLING OR STAYING ASLEEP: NEARLY EVERY DAY
6. FEELING BAD ABOUT YOURSELF - OR THAT YOU ARE A FAILURE OR HAVE LET YOURSELF OR YOUR FAMILY DOWN: NEARLY EVERY DAY
5. POOR APPETITE OR OVEREATING: NEARLY EVERY DAY
SUM OF ALL RESPONSES TO PHQ9 QUESTIONS 1 & 2: 6
4. FEELING TIRED OR HAVING LITTLE ENERGY: NEARLY EVERY DAY
1. LITTLE INTEREST OR PLEASURE IN DOING THINGS: NEARLY EVERY DAY
8. MOVING OR SPEAKING SO SLOWLY THAT OTHER PEOPLE COULD HAVE NOTICED. OR THE OPPOSITE - BEING SO FIDGETY OR RESTLESS THAT YOU HAVE BEEN MOVING AROUND A LOT MORE THAN USUAL: SEVERAL DAYS
2. FEELING DOWN, DEPRESSED OR HOPELESS: NEARLY EVERY DAY
7. TROUBLE CONCENTRATING ON THINGS, SUCH AS READING THE NEWSPAPER OR WATCHING TELEVISION: NEARLY EVERY DAY
10. IF YOU CHECKED OFF ANY PROBLEMS, HOW DIFFICULT HAVE THESE PROBLEMS MADE IT FOR YOU TO DO YOUR WORK, TAKE CARE OF THINGS AT HOME, OR GET ALONG WITH OTHER PEOPLE: EXTREMELY DIFFICULT
8. MOVING OR SPEAKING SO SLOWLY THAT OTHER PEOPLE COULD HAVE NOTICED. OR THE OPPOSITE, BEING SO FIGETY OR RESTLESS THAT YOU HAVE BEEN MOVING AROUND A LOT MORE THAN USUAL: SEVERAL DAYS
9. THOUGHTS THAT YOU WOULD BE BETTER OFF DEAD, OR OF HURTING YOURSELF: NOT AT ALL
2. FEELING DOWN, DEPRESSED OR HOPELESS: NEARLY EVERY DAY
9. THOUGHTS THAT YOU WOULD BE BETTER OFF DEAD, OR OF HURTING YOURSELF: NOT AT ALL
1. LITTLE INTEREST OR PLEASURE IN DOING THINGS: NEARLY EVERY DAY
10. IF YOU CHECKED OFF ANY PROBLEMS, HOW DIFFICULT HAVE THESE PROBLEMS MADE IT FOR YOU TO DO YOUR WORK, TAKE CARE OF THINGS AT HOME, OR GET ALONG WITH OTHER PEOPLE: EXTREMELY DIFFICULT
6. FEELING BAD ABOUT YOURSELF - OR THAT YOU ARE A FAILURE OR HAVE LET YOURSELF OR YOUR FAMILY DOWN: NEARLY EVERY DAY
SUM OF ALL RESPONSES TO PHQ QUESTIONS 1-9: 22
3. TROUBLE FALLING OR STAYING ASLEEP OR SLEEPING TOO MUCH: NEARLY EVERY DAY
7. TROUBLE CONCENTRATING ON THINGS, SUCH AS READING THE NEWSPAPER OR WATCHING TELEVISION: NEARLY EVERY DAY

## 2025-06-06 ASSESSMENT — PAIN SCALES - GENERAL
PAINLEVEL_OUTOF10: 0 - NO PAIN

## 2025-06-06 ASSESSMENT — COLUMBIA-SUICIDE SEVERITY RATING SCALE - C-SSRS
1. IN THE PAST MONTH, HAVE YOU WISHED YOU WERE DEAD OR WISHED YOU COULD GO TO SLEEP AND NOT WAKE UP?: NO
2. HAVE YOU ACTUALLY HAD ANY THOUGHTS OF KILLING YOURSELF?: NO
6. IN YOUR LIFETIME, HAVE YOU EVER DONE ANYTHING, STARTED TO DO ANYTHING, OR PREPARED TO DO ANYTHING TO END YOUR LIFE?: NO

## 2025-06-06 ASSESSMENT — PAIN - FUNCTIONAL ASSESSMENT
PAIN_FUNCTIONAL_ASSESSMENT: 0-10

## 2025-06-06 NOTE — PATIENT INSTRUCTIONS
Homegoing Instructions      Your Provider's Instructions:    Post-Treatment Instructions:    No alcohol for 24 hours.    Do not drive or operate machinery until the day after treatment and after a full night of sleep.    Do not make important decisions or sign legal documents for 24 hours.    A responsible adult should remain with you for 24 hours due to possible dizziness or sedation.    Resume a normal diet unless otherwise instructed.    When to Contact a Provider:    Contact your provider with any questions or concerns.    Call 911 for any emergencies.    Health Maintenance:    If you use tobacco, quitting is strongly recommended.  Resources:    American Heart Association: (817) 152-8424 or www.americanheart.org    [place] Tobacco Quit Line: 1-800-QUIT-NOW (1-244.437.4299)    [facility] of Health: www.[facility].[place].gov    For alcohol or drug use resources: Call [employer] at 211 or visit www.Doist.gov.    For safe medication disposal: Visit www.rxdrugdropbox.org or contact your local police department.      Instructions Before Next Spravato/Ketamine Appointment:      Arrange transportation to and from your appointment (friend, family member, or insurance-covered ride).    Avoid smoking, vaping, and nicotine use if possible.    No alcohol for 24 hours prior to your appointment.    Instructions for Day of Treatment:    Diet:  No solid food for 2 hours before appointment.    Clear liquids (water, tea, black coffee, clear juice, plain Jello, hard candy) are allowed up to 30 minutes before appointment. (No milk or cream.)    What to Bring:    Headphones for calming music or guided meditation.    Journal if desired.    Any prescribed inhalers.    Medications:    Take morning medications as prescribed, except:    No chlordiazepoxide (Librium), diazepam (Valium), or flurazepam (Dalmane) within 24 hours before appointment.    No lamotrigine (Lamictal) within 12 hours before appointment.    Avoid anti-anxiety  medications on the day of treatment unless absolutely necessary.    No stimulants prior to treatment (may resume after treatment).    Nasal sprays (for Spravato) can be used up to 1 hour before appointment.    Intentions:    Patients are encouraged to set a personal intention for each treatment session.    Cancellation Policy:    Please provide at least 24 hours’ notice for cancellations.    Three late cancellations or no-shows may require a treatment plan review with your outpatient psychiatrist before rescheduling.    If more than 15 minutes late, the appointment may need to be rescheduled.    Call [phone] to cancel or reschedule.

## 2025-06-06 NOTE — PROCEDURES
"Interventional Psychiatry    Date/Time: 6/6/2025 12:09 PM    Performed by: Wesley Dunn MD PhD  Authorized by: Wesley Dunn MD PhD      Intranasal Esketamine (SPRAVATO)    Esketamine: 56mg, once    Subjective:  Procedure (Patient last ate (Date/Time): 06/6/25 930am/Any concerns regarding today's treatment: No/Any concerns regarding previous treatment: N/A             )    She said she continued feeling very depressed and lack of motivation and pleasure. Still felt hopeless and helpless most of the time. Denied having SI/HI/AVH or paranoia. She said she felt safe at home. No change in appetite or weight.  Continued having problem with sleep even with Klonopin and Seroquel because of nightmares. Energy and concentration were not good. Also felt irritable and easily agitated.   Still felt anxious daily and panic attacks  Continue flashbacks and other PTSD sx.   Pros and cons from intranasal esketamine were explained to her before she signed the ICF. She fully understood.     Interval changes in health: Denies   Interval changes in medications: Denies        Synopsis SmartLink 6/6/2025   Scores   Patient Health Questionnaire-9 Score 22    BH LOLI-7 Total Score 21       Patient-reported     Objective:  Mental Status Exam  General/Appearance: no distress, appears older than stated age, moderately kept  Attitude/Behavior: Cooperative, conversant, engaged, and with good eye contact.  Motor Activity: no psychomotor agitation or retardation, no tremor or other abnormal movements, gait: Within normal limits  Mood: \"ok\"  Affect: Restricted range, Mood congruent, and Appropriate to content  Speech: Spontaneous, Coherent, Fluent, Soft, Appropriate rate, and Appropriate quantity  Thought Process: linear, goal directed  Thought Content: no suicidal ideation, delusions:none   Thought Perception: no perceptual abnormalities noted  Cognition: grossly intact  Insight: intact  Judgment: intact    Time out was taken with staff to confirm " "correct patient and correct procedure to be performed. Fall precautions in place throughout procedure. SpO2 continuously monitored throughout treatment. REMS eligibility confirmed.    Intranasal Esketamine was self-administered under direct RN supervision.    Vitals:    06/06/25 1143 06/06/25 1255 06/06/25 1413   BP: 118/79 123/80 108/75   Pulse: 81 82 81   Resp: 18 16 18   Temp: 35.7 °C (96.3 °F)     TempSrc: Temporal     SpO2: 97% 100% 95%        Synopsis SmartLink 6/6/2025    11:43 6/6/2025    12:55 6/6/2025    14:13   RASS   Andrews Agitation Sedation Scale 0 -1 0       Patient tolerated treatment well. She felt drowsy and floaty about 10 minutes after the second dosing. No other side effect. The effects of medications had weakened when reassessed at about 40 minutes post-dosing. She felt \"come down\". No complications. Patient was continuously monitored for 120 minutes. Discharge instructions were provided to patient with After Visit Summary. Patient was discharged home in the care of their designated .      Assessment:  1. Current severe episode of major depressive disorder without psychotic features without prior episode (Multi)      Currently not at goal  No immediate risk of harm to self or others    Plan:  Continue Intranasal Esketamine twice a week. Follow up in about 3 days (around 6/9/2025). Continue current antidepressant and other psychotropic medication regimen prescribed by outpatient provider and other mental health treatments.         Wesley Dunn MD PhD    "

## 2025-06-09 ENCOUNTER — HOSPITAL ENCOUNTER (OUTPATIENT)
Facility: HOSPITAL | Age: 55
Discharge: HOME | End: 2025-06-09
Payer: MEDICARE

## 2025-06-09 VITALS
TEMPERATURE: 99.7 F | RESPIRATION RATE: 16 BRPM | DIASTOLIC BLOOD PRESSURE: 84 MMHG | OXYGEN SATURATION: 98 % | HEART RATE: 73 BPM | SYSTOLIC BLOOD PRESSURE: 119 MMHG

## 2025-06-09 DIAGNOSIS — F32.2 CURRENT SEVERE EPISODE OF MAJOR DEPRESSIVE DISORDER WITHOUT PSYCHOTIC FEATURES WITHOUT PRIOR EPISODE (MULTI): Primary | ICD-10-CM

## 2025-06-09 PROCEDURE — S0013 ESKETAMINE, NASAL SPRAY: HCPCS | Performed by: PSYCHIATRY & NEUROLOGY

## 2025-06-09 PROCEDURE — G2083 VISIT ESKETAMINE, > 56M: HCPCS | Performed by: PSYCHIATRY & NEUROLOGY

## 2025-06-09 PROCEDURE — 2500000004 HC RX 250 GENERAL PHARMACY W/ HCPCS (ALT 636 FOR OP/ED): Performed by: PSYCHIATRY & NEUROLOGY

## 2025-06-09 RX ORDER — ALBUTEROL SULFATE 0.83 MG/ML
3 SOLUTION RESPIRATORY (INHALATION) AS NEEDED
Status: DISCONTINUED | OUTPATIENT
Start: 2025-06-09 | End: 2025-06-10 | Stop reason: HOSPADM

## 2025-06-09 RX ORDER — ACETAMINOPHEN 325 MG/1
975 TABLET ORAL ONCE AS NEEDED
Status: CANCELLED | OUTPATIENT
Start: 2025-06-13

## 2025-06-09 RX ORDER — DIPHENHYDRAMINE HCL 50 MG
50 CAPSULE ORAL ONCE AS NEEDED
Status: CANCELLED | OUTPATIENT
Start: 2025-06-13

## 2025-06-09 RX ORDER — LABETALOL 100 MG/1
100 TABLET, FILM COATED ORAL AS NEEDED
Status: DISCONTINUED | OUTPATIENT
Start: 2025-06-09 | End: 2025-06-10 | Stop reason: HOSPADM

## 2025-06-09 RX ORDER — EPINEPHRINE 1 MG/ML
0.3 INJECTION, SOLUTION, CONCENTRATE INTRAVENOUS EVERY 5 MIN PRN
Status: CANCELLED | OUTPATIENT
Start: 2025-06-13

## 2025-06-09 RX ORDER — DIPHENHYDRAMINE HCL 50 MG
50 CAPSULE ORAL ONCE AS NEEDED
Status: DISCONTINUED | OUTPATIENT
Start: 2025-06-09 | End: 2025-06-10 | Stop reason: HOSPADM

## 2025-06-09 RX ORDER — FAMOTIDINE 10 MG/ML
20 INJECTION, SOLUTION INTRAVENOUS ONCE AS NEEDED
Status: DISCONTINUED | OUTPATIENT
Start: 2025-06-09 | End: 2025-06-10 | Stop reason: HOSPADM

## 2025-06-09 RX ORDER — EPINEPHRINE 1 MG/ML
0.3 INJECTION, SOLUTION, CONCENTRATE INTRAVENOUS EVERY 5 MIN PRN
Status: DISCONTINUED | OUTPATIENT
Start: 2025-06-09 | End: 2025-06-10 | Stop reason: HOSPADM

## 2025-06-09 RX ORDER — ONDANSETRON 4 MG/1
4 TABLET, FILM COATED ORAL ONCE AS NEEDED
Status: DISCONTINUED | OUTPATIENT
Start: 2025-06-09 | End: 2025-06-10 | Stop reason: HOSPADM

## 2025-06-09 RX ORDER — ACETAMINOPHEN 325 MG/1
975 TABLET ORAL ONCE AS NEEDED
Status: DISCONTINUED | OUTPATIENT
Start: 2025-06-09 | End: 2025-06-10 | Stop reason: HOSPADM

## 2025-06-09 RX ORDER — ALBUTEROL SULFATE 0.83 MG/ML
3 SOLUTION RESPIRATORY (INHALATION) AS NEEDED
Status: CANCELLED | OUTPATIENT
Start: 2025-06-13

## 2025-06-09 RX ORDER — DIPHENHYDRAMINE HYDROCHLORIDE 50 MG/ML
50 INJECTION, SOLUTION INTRAMUSCULAR; INTRAVENOUS AS NEEDED
Status: CANCELLED | OUTPATIENT
Start: 2025-06-13

## 2025-06-09 RX ORDER — FAMOTIDINE 10 MG/ML
20 INJECTION, SOLUTION INTRAVENOUS ONCE AS NEEDED
Status: CANCELLED | OUTPATIENT
Start: 2025-06-13

## 2025-06-09 RX ORDER — DIPHENHYDRAMINE HYDROCHLORIDE 50 MG/ML
50 INJECTION, SOLUTION INTRAMUSCULAR; INTRAVENOUS AS NEEDED
Status: DISCONTINUED | OUTPATIENT
Start: 2025-06-09 | End: 2025-06-10 | Stop reason: HOSPADM

## 2025-06-09 RX ORDER — ONDANSETRON 4 MG/1
4 TABLET, FILM COATED ORAL ONCE AS NEEDED
Status: CANCELLED | OUTPATIENT
Start: 2025-06-13

## 2025-06-09 RX ORDER — LABETALOL 100 MG/1
100 TABLET, FILM COATED ORAL AS NEEDED
Status: CANCELLED | OUTPATIENT
Start: 2025-06-13

## 2025-06-09 RX ADMIN — ESKETAMINE HYDROCHLORIDE 84 MG: 28 SOLUTION NASAL at 11:53

## 2025-06-09 ASSESSMENT — PATIENT HEALTH QUESTIONNAIRE - PHQ9
4. FEELING TIRED OR HAVING LITTLE ENERGY: NEARLY EVERY DAY
7. TROUBLE CONCENTRATING ON THINGS, SUCH AS READING THE NEWSPAPER OR WATCHING TELEVISION: NEARLY EVERY DAY
4. FEELING TIRED OR HAVING LITTLE ENERGY: NEARLY EVERY DAY
8. MOVING OR SPEAKING SO SLOWLY THAT OTHER PEOPLE COULD HAVE NOTICED. OR THE OPPOSITE - BEING SO FIDGETY OR RESTLESS THAT YOU HAVE BEEN MOVING AROUND A LOT MORE THAN USUAL: MORE THAN HALF THE DAYS
5. POOR APPETITE OR OVEREATING: NEARLY EVERY DAY
8. MOVING OR SPEAKING SO SLOWLY THAT OTHER PEOPLE COULD HAVE NOTICED. OR THE OPPOSITE, BEING SO FIGETY OR RESTLESS THAT YOU HAVE BEEN MOVING AROUND A LOT MORE THAN USUAL: MORE THAN HALF THE DAYS
10. IF YOU CHECKED OFF ANY PROBLEMS, HOW DIFFICULT HAVE THESE PROBLEMS MADE IT FOR YOU TO DO YOUR WORK, TAKE CARE OF THINGS AT HOME, OR GET ALONG WITH OTHER PEOPLE: VERY DIFFICULT
1. LITTLE INTEREST OR PLEASURE IN DOING THINGS: NEARLY EVERY DAY
9. THOUGHTS THAT YOU WOULD BE BETTER OFF DEAD, OR OF HURTING YOURSELF: NOT AT ALL
1. LITTLE INTEREST OR PLEASURE IN DOING THINGS: NEARLY EVERY DAY
6. FEELING BAD ABOUT YOURSELF - OR THAT YOU ARE A FAILURE OR HAVE LET YOURSELF OR YOUR FAMILY DOWN: NEARLY EVERY DAY
7. TROUBLE CONCENTRATING ON THINGS, SUCH AS READING THE NEWSPAPER OR WATCHING TELEVISION: NEARLY EVERY DAY
5. POOR APPETITE OR OVEREATING: NEARLY EVERY DAY
3. TROUBLE FALLING OR STAYING ASLEEP: NEARLY EVERY DAY
2. FEELING DOWN, DEPRESSED OR HOPELESS: NEARLY EVERY DAY
6. FEELING BAD ABOUT YOURSELF - OR THAT YOU ARE A FAILURE OR HAVE LET YOURSELF OR YOUR FAMILY DOWN: NEARLY EVERY DAY
2. FEELING DOWN, DEPRESSED OR HOPELESS: NEARLY EVERY DAY
3. TROUBLE FALLING OR STAYING ASLEEP OR SLEEPING TOO MUCH: NEARLY EVERY DAY
SUM OF ALL RESPONSES TO PHQ QUESTIONS 1-9: 23
9. THOUGHTS THAT YOU WOULD BE BETTER OFF DEAD, OR OF HURTING YOURSELF: NOT AT ALL
SUM OF ALL RESPONSES TO PHQ9 QUESTIONS 1 & 2: 6
10. IF YOU CHECKED OFF ANY PROBLEMS, HOW DIFFICULT HAVE THESE PROBLEMS MADE IT FOR YOU TO DO YOUR WORK, TAKE CARE OF THINGS AT HOME, OR GET ALONG WITH OTHER PEOPLE: VERY DIFFICULT

## 2025-06-09 ASSESSMENT — ANXIETY QUESTIONNAIRES
GAD7 TOTAL SCORE: 21
4. TROUBLE RELAXING: NEARLY EVERY DAY
1. FEELING NERVOUS, ANXIOUS, OR ON EDGE: NEARLY EVERY DAY
1. FEELING NERVOUS, ANXIOUS, OR ON EDGE: NEARLY EVERY DAY
2. NOT BEING ABLE TO STOP OR CONTROL WORRYING: NEARLY EVERY DAY
3. WORRYING TOO MUCH ABOUT DIFFERENT THINGS: NEARLY EVERY DAY
6. BECOMING EASILY ANNOYED OR IRRITABLE: NEARLY EVERY DAY
7. FEELING AFRAID AS IF SOMETHING AWFUL MIGHT HAPPEN: NEARLY EVERY DAY
5. BEING SO RESTLESS THAT IT IS HARD TO SIT STILL: NEARLY EVERY DAY
2. NOT BEING ABLE TO STOP OR CONTROL WORRYING: NEARLY EVERY DAY
7. FEELING AFRAID AS IF SOMETHING AWFUL MIGHT HAPPEN: NEARLY EVERY DAY
3. WORRYING TOO MUCH ABOUT DIFFERENT THINGS: NEARLY EVERY DAY
6. BECOMING EASILY ANNOYED OR IRRITABLE: NEARLY EVERY DAY
4. TROUBLE RELAXING: NEARLY EVERY DAY
5. BEING SO RESTLESS THAT IT IS HARD TO SIT STILL: NEARLY EVERY DAY
IF YOU CHECKED OFF ANY PROBLEMS ON THIS QUESTIONNAIRE, HOW DIFFICULT HAVE THESE PROBLEMS MADE IT FOR YOU TO DO YOUR WORK, TAKE CARE OF THINGS AT HOME, OR GET ALONG WITH OTHER PEOPLE: VERY DIFFICULT
IF YOU CHECKED OFF ANY PROBLEMS ON THIS QUESTIONNAIRE, HOW DIFFICULT HAVE THESE PROBLEMS MADE IT FOR YOU TO DO YOUR WORK, TAKE CARE OF THINGS AT HOME, OR GET ALONG WITH OTHER PEOPLE: VERY DIFFICULT

## 2025-06-09 ASSESSMENT — COLUMBIA-SUICIDE SEVERITY RATING SCALE - C-SSRS
2. HAVE YOU ACTUALLY HAD ANY THOUGHTS OF KILLING YOURSELF?: NO
1. IN THE PAST MONTH, HAVE YOU WISHED YOU WERE DEAD OR WISHED YOU COULD GO TO SLEEP AND NOT WAKE UP?: NO
6. IN YOUR LIFETIME, HAVE YOU EVER DONE ANYTHING, STARTED TO DO ANYTHING, OR PREPARED TO DO ANYTHING TO END YOUR LIFE?: NO

## 2025-06-09 ASSESSMENT — PAIN - FUNCTIONAL ASSESSMENT
PAIN_FUNCTIONAL_ASSESSMENT: 0-10

## 2025-06-09 ASSESSMENT — ENCOUNTER SYMPTOMS
LOSS OF SENSATION IN FEET: 0
OCCASIONAL FEELINGS OF UNSTEADINESS: 0
DEPRESSION: 1

## 2025-06-09 ASSESSMENT — PAIN SCALES - GENERAL
PAINLEVEL_OUTOF10: 0 - NO PAIN

## 2025-06-09 NOTE — PROCEDURES
"Interventional Psychiatry    Date/Time: 6/9/2025 11:23 AM    Performed by: Wesley Dunn MD PhD  Authorized by: Wesley Dunn MD PhD        Intranasal Esketamine (SPRAVATO)    Esketamine: 84mg, twice weekly    Subjective:  Procedure (Patient last ate (Date/Time): 06/9/25 9am/Any concerns regarding today's treatment: No/Any concerns regarding previous treatment: No             )    She said she felt ok and slept after she was discharged home. She said she continued feeling very depressed and felt lack of motivation and pressure. Still hopeless and helpless on and off, but denied having SI, plan or intend. Denied having HI/AVH or paranoia. Ate ok. Slept ok with medications, but continued having nightmares. Energy and concentration were not good. Still felt irritable.   Anxiety was still a problem and felt panic on an off.  Still had flashbacks and other sx of PTSD     Interval changes in health: Denies   Interval changes in medications: Denies        Synopsis SmartLink 6/6/2025 6/9/2025   Scores   Patient Health Questionnaire-9 Score 22  23     LOLI-7 Total Score 21 21        Patient-reported     Objective:  Mental Status Exam  General/Appearance: no distress, appears stated age, moderately kept  Attitude/Behavior: Cooperative, conversant, engaged, and with good eye contact.  Motor Activity: no psychomotor agitation or retardation, no tremor or other abnormal movements, gait: Within normal limits  Mood: \"ok\"  Affect: Restricted range  Speech: Spontaneous, Coherent, Fluent, Soft, Appropriate rate, and Appropriate quantity  Thought Process: linear, goal directed  Thought Content: no suicidal ideation, delusions:none   Thought Perception: no perceptual abnormalities noted  Cognition: grossly intact  Insight: intact  Judgment: intact    Time out was taken with staff to confirm correct patient and correct procedure to be performed. Fall precautions in place throughout procedure. SpO2 continuously monitored throughout " treatment. REMS eligibility confirmed.    Intranasal Esketamine was self-administered under direct RN supervision.    Vitals:    25 1126 25 1233 25 1353   BP: 110/76 155/90 119/84   Pulse: 78 80 73   Resp: 16 16 16   Temp: 37.6 °C (99.7 °F)     TempSrc: Temporal     SpO2: 98% 95% 98%        Synopsis SmartLink 2025    11:26 2025    12:33 2025    13:53   RASS   Andrews Agitation Sedation Scale 0 -1 0       Patient tolerated treatment well.  About 10 minutes after the third dose, she said she saw the blood on the floor. She was talking about a young man who  on her laps when she served in [employer]. She said she did not understand why the young man killed himself. She felt guilty because she could not help that young man more. The scene of the young  came to her mind at the time of assessment. About 30 minutes after the third dose, she said she still felt drowsy and started she need stay positive.  About 60 minutes after the third dose, she said she still felt a little floaty in her couch, but she felt mellow. She said SPRAVATO did make her have racing thoughts.  She felt the effects of medications had significantly weakened when assessed at 60 minutes post-dosing. No complications. Patient was continuously monitored for 120 minutes. Discharge instructions were provided to patient with After Visit Summary. Patient was discharged home in the care of their designated .    Assessment:  1. Current severe episode of major depressive disorder without psychotic features without prior episode (Multi)      Currently not at goal  No immediate risk of harm to self or others    Plan:  Continue Intranasal Esketamine. Follow up in about 2 days (around 2025). Continue current antidepressant and other psychotropic medication regimen prescribed by outpatient provider and other mental health treatment including therapy with her therapist.         Wesley Dunn MD PhD

## 2025-06-09 NOTE — PATIENT INSTRUCTIONS
Homegoing Instructions      Your Provider's Instructions:    Post-Treatment Instructions:    No alcohol for 24 hours.    Do not drive or operate machinery until the day after treatment and after a full night of sleep.    Do not make important decisions or sign legal documents for 24 hours.    A responsible adult should remain with you for 24 hours due to possible dizziness or sedation.    Resume a normal diet unless otherwise instructed.    When to Contact a Provider:    Contact your provider with any questions or concerns.    Call 911 for any emergencies.    Health Maintenance:    If you use tobacco, quitting is strongly recommended.  Resources:    American Heart Association: (850) 873-6133 or www.americanheart.org    [place] Tobacco Quit Line: 1-800-QUIT-NOW (1-984.714.2692)    [facility] of Health: www.[facility].[place].gov    For alcohol or drug use resources: Call [employer] at 211 or visit www.[employer].gov.    For safe medication disposal: Visit www.rxdrugdropbox.org or contact your local police department.      Instructions Before Next Spravato/Ketamine Appointment:      Arrange transportation to and from your appointment (friend, family member, or insurance-covered ride).    Avoid smoking, vaping, and nicotine use if possible.    No alcohol for 24 hours prior to your appointment.    Instructions for Day of Treatment:    Diet:  No solid food for 2 hours before appointment.    Clear liquids (water, tea, black coffee, clear juice, plain Jello, hard candy) are allowed up to 30 minutes before appointment. (No milk or cream.)    What to Bring:    Headphones for calming music or guided meditation.    Journal if desired.    Any prescribed inhalers.    Medications:    Take morning medications as prescribed, except:    No chlordiazepoxide (Librium), diazepam (Valium), or flurazepam (Dalmane) within 24 hours before appointment.    No lamotrigine (Lamictal) within 12 hours before appointment.    Avoid anti-anxiety  medications on the day of treatment unless absolutely necessary.    No stimulants prior to treatment (may resume after treatment).    Nasal sprays (for Spravato) can be used up to 1 hour before appointment.    Intentions:    Patients are encouraged to set a personal intention for each treatment session.    Cancellation Policy:    Please provide at least 24 hours’ notice for cancellations.    Three late cancellations or no-shows may require a treatment plan review with your outpatient psychiatrist before rescheduling.    If more than 15 minutes late, the appointment may need to be rescheduled.    Call [phone] to cancel or reschedule.

## 2025-06-10 ENCOUNTER — HOSPITAL ENCOUNTER (OUTPATIENT)
Dept: RADIOLOGY | Facility: HOSPITAL | Age: 55
Discharge: HOME | End: 2025-06-10
Payer: MEDICARE

## 2025-06-10 DIAGNOSIS — N63.32 UNSPECIFIED LUMP IN AXILLARY TAIL OF THE LEFT BREAST: ICD-10-CM

## 2025-06-10 DIAGNOSIS — R22.33 AXILLARY MASS, BILATERAL: ICD-10-CM

## 2025-06-10 PROCEDURE — 76642 ULTRASOUND BREAST LIMITED: CPT

## 2025-06-11 ENCOUNTER — HOSPITAL ENCOUNTER (OUTPATIENT)
Facility: HOSPITAL | Age: 55
Discharge: HOME | End: 2025-06-11
Payer: MEDICARE

## 2025-06-11 VITALS
TEMPERATURE: 99.1 F | DIASTOLIC BLOOD PRESSURE: 72 MMHG | SYSTOLIC BLOOD PRESSURE: 110 MMHG | RESPIRATION RATE: 16 BRPM | HEART RATE: 73 BPM | OXYGEN SATURATION: 96 %

## 2025-06-11 DIAGNOSIS — F32.2 CURRENT SEVERE EPISODE OF MAJOR DEPRESSIVE DISORDER WITHOUT PSYCHOTIC FEATURES WITHOUT PRIOR EPISODE (MULTI): Primary | ICD-10-CM

## 2025-06-11 LAB
25(OH)D3+25(OH)D2 SERPL-MCNC: 67 NG/ML (ref 30–100)
ALBUMIN SERPL-MCNC: 4.5 G/DL (ref 3.6–5.1)
ALBUMIN/CREAT UR: 2 MG/G CREAT
ALP SERPL-CCNC: 62 U/L (ref 37–153)
ALT SERPL-CCNC: 10 U/L (ref 6–29)
ANION GAP SERPL CALCULATED.4IONS-SCNC: 8 MMOL/L (CALC) (ref 7–17)
AST SERPL-CCNC: 13 U/L (ref 10–35)
BASOPHILS # BLD AUTO: 37 CELLS/UL (ref 0–200)
BASOPHILS NFR BLD AUTO: 0.4 %
BILIRUB SERPL-MCNC: 0.5 MG/DL (ref 0.2–1.2)
BUN SERPL-MCNC: 13 MG/DL (ref 7–25)
CALCIUM SERPL-MCNC: 9.5 MG/DL (ref 8.6–10.4)
CHLORIDE SERPL-SCNC: 107 MMOL/L (ref 98–110)
CHOLEST SERPL-MCNC: 188 MG/DL
CHOLEST/HDLC SERPL: 3.1 (CALC)
CO2 SERPL-SCNC: 24 MMOL/L (ref 20–32)
CREAT SERPL-MCNC: 0.64 MG/DL (ref 0.5–1.03)
CREAT UR-MCNC: 125 MG/DL (ref 20–275)
EGFRCR SERPLBLD CKD-EPI 2021: 106 ML/MIN/1.73M2
EOSINOPHIL # BLD AUTO: 149 CELLS/UL (ref 15–500)
EOSINOPHIL NFR BLD AUTO: 1.6 %
ERYTHROCYTE [DISTWIDTH] IN BLOOD BY AUTOMATED COUNT: 12.7 % (ref 11–15)
EST. AVERAGE GLUCOSE BLD GHB EST-MCNC: 114 MG/DL
EST. AVERAGE GLUCOSE BLD GHB EST-SCNC: 6.3 MMOL/L
GLUCOSE SERPL-MCNC: 92 MG/DL (ref 65–99)
HBA1C MFR BLD: 5.6 %
HCT VFR BLD AUTO: 45.3 % (ref 35–45)
HDLC SERPL-MCNC: 61 MG/DL
HGB BLD-MCNC: 14 G/DL (ref 11.7–15.5)
LDLC SERPL CALC-MCNC: 111 MG/DL (CALC)
LYMPHOCYTES # BLD AUTO: 2539 CELLS/UL (ref 850–3900)
LYMPHOCYTES NFR BLD AUTO: 27.3 %
MAGNESIUM SERPL-MCNC: 2.1 MG/DL (ref 1.5–2.5)
MCH RBC QN AUTO: 25.8 PG (ref 27–33)
MCHC RBC AUTO-ENTMCNC: 30.9 G/DL (ref 32–36)
MCV RBC AUTO: 83.4 FL (ref 80–100)
MICROALBUMIN UR-MCNC: 0.3 MG/DL
MONOCYTES # BLD AUTO: 372 CELLS/UL (ref 200–950)
MONOCYTES NFR BLD AUTO: 4 %
NEUTROPHILS # BLD AUTO: 6203 CELLS/UL (ref 1500–7800)
NEUTROPHILS NFR BLD AUTO: 66.7 %
NONHDLC SERPL-MCNC: 127 MG/DL (CALC)
PLATELET # BLD AUTO: 280 THOUSAND/UL (ref 140–400)
PMV BLD REES-ECKER: 9.9 FL (ref 7.5–12.5)
POTASSIUM SERPL-SCNC: 4.6 MMOL/L (ref 3.5–5.3)
PROT SERPL-MCNC: 6.7 G/DL (ref 6.1–8.1)
RBC # BLD AUTO: 5.43 MILLION/UL (ref 3.8–5.1)
SODIUM SERPL-SCNC: 139 MMOL/L (ref 135–146)
TRIGL SERPL-MCNC: 70 MG/DL
TSH SERPL-ACNC: 1.74 MIU/L
VIT B12 SERPL-MCNC: 245 PG/ML (ref 200–1100)
WBC # BLD AUTO: 9.3 THOUSAND/UL (ref 3.8–10.8)

## 2025-06-11 PROCEDURE — 2500000004 HC RX 250 GENERAL PHARMACY W/ HCPCS (ALT 636 FOR OP/ED): Performed by: STUDENT IN AN ORGANIZED HEALTH CARE EDUCATION/TRAINING PROGRAM

## 2025-06-11 RX ORDER — DIPHENHYDRAMINE HYDROCHLORIDE 50 MG/ML
50 INJECTION, SOLUTION INTRAMUSCULAR; INTRAVENOUS AS NEEDED
Status: DISCONTINUED | OUTPATIENT
Start: 2025-06-11 | End: 2025-06-12 | Stop reason: HOSPADM

## 2025-06-11 RX ORDER — DIPHENHYDRAMINE HYDROCHLORIDE 50 MG/ML
50 INJECTION, SOLUTION INTRAMUSCULAR; INTRAVENOUS AS NEEDED
OUTPATIENT
Start: 2025-06-13

## 2025-06-11 RX ORDER — DIPHENHYDRAMINE HCL 50 MG
50 CAPSULE ORAL ONCE AS NEEDED
Status: DISCONTINUED | OUTPATIENT
Start: 2025-06-11 | End: 2025-06-12 | Stop reason: HOSPADM

## 2025-06-11 RX ORDER — LABETALOL 100 MG/1
100 TABLET, FILM COATED ORAL AS NEEDED
Status: DISCONTINUED | OUTPATIENT
Start: 2025-06-11 | End: 2025-06-12 | Stop reason: HOSPADM

## 2025-06-11 RX ORDER — FAMOTIDINE 10 MG/ML
20 INJECTION, SOLUTION INTRAVENOUS ONCE AS NEEDED
Status: DISCONTINUED | OUTPATIENT
Start: 2025-06-11 | End: 2025-06-12 | Stop reason: HOSPADM

## 2025-06-11 RX ORDER — EPINEPHRINE 1 MG/ML
0.3 INJECTION, SOLUTION, CONCENTRATE INTRAVENOUS EVERY 5 MIN PRN
Status: DISCONTINUED | OUTPATIENT
Start: 2025-06-11 | End: 2025-06-12 | Stop reason: HOSPADM

## 2025-06-11 RX ORDER — ACETAMINOPHEN 325 MG/1
975 TABLET ORAL ONCE AS NEEDED
Status: DISCONTINUED | OUTPATIENT
Start: 2025-06-11 | End: 2025-06-12 | Stop reason: HOSPADM

## 2025-06-11 RX ORDER — ALBUTEROL SULFATE 0.83 MG/ML
3 SOLUTION RESPIRATORY (INHALATION) AS NEEDED
Status: DISCONTINUED | OUTPATIENT
Start: 2025-06-11 | End: 2025-06-12 | Stop reason: HOSPADM

## 2025-06-11 RX ORDER — FAMOTIDINE 10 MG/ML
20 INJECTION, SOLUTION INTRAVENOUS ONCE AS NEEDED
OUTPATIENT
Start: 2025-06-13

## 2025-06-11 RX ORDER — ALBUTEROL SULFATE 0.83 MG/ML
3 SOLUTION RESPIRATORY (INHALATION) AS NEEDED
OUTPATIENT
Start: 2025-06-13

## 2025-06-11 RX ORDER — EPINEPHRINE 1 MG/ML
0.3 INJECTION, SOLUTION, CONCENTRATE INTRAVENOUS EVERY 5 MIN PRN
OUTPATIENT
Start: 2025-06-13

## 2025-06-11 RX ORDER — ONDANSETRON 4 MG/1
4 TABLET, FILM COATED ORAL ONCE AS NEEDED
OUTPATIENT
Start: 2025-06-13

## 2025-06-11 RX ORDER — ONDANSETRON 4 MG/1
4 TABLET, FILM COATED ORAL ONCE AS NEEDED
Status: DISCONTINUED | OUTPATIENT
Start: 2025-06-11 | End: 2025-06-12 | Stop reason: HOSPADM

## 2025-06-11 RX ORDER — LABETALOL 100 MG/1
100 TABLET, FILM COATED ORAL AS NEEDED
OUTPATIENT
Start: 2025-06-13

## 2025-06-11 RX ORDER — ACETAMINOPHEN 325 MG/1
975 TABLET ORAL ONCE AS NEEDED
OUTPATIENT
Start: 2025-06-13

## 2025-06-11 RX ORDER — DIPHENHYDRAMINE HCL 50 MG
50 CAPSULE ORAL ONCE AS NEEDED
OUTPATIENT
Start: 2025-06-13

## 2025-06-11 RX ADMIN — ONDANSETRON HYDROCHLORIDE 4 MG: 4 TABLET, FILM COATED ORAL at 11:40

## 2025-06-11 ASSESSMENT — ENCOUNTER SYMPTOMS
OCCASIONAL FEELINGS OF UNSTEADINESS: 0
LOSS OF SENSATION IN FEET: 0
DEPRESSION: 1

## 2025-06-11 ASSESSMENT — PATIENT HEALTH QUESTIONNAIRE - PHQ9
3. TROUBLE FALLING OR STAYING ASLEEP OR SLEEPING TOO MUCH: NEARLY EVERY DAY
10. IF YOU CHECKED OFF ANY PROBLEMS, HOW DIFFICULT HAVE THESE PROBLEMS MADE IT FOR YOU TO DO YOUR WORK, TAKE CARE OF THINGS AT HOME, OR GET ALONG WITH OTHER PEOPLE: VERY DIFFICULT
4. FEELING TIRED OR HAVING LITTLE ENERGY: NEARLY EVERY DAY
5. POOR APPETITE OR OVEREATING: NEARLY EVERY DAY
1. LITTLE INTEREST OR PLEASURE IN DOING THINGS: NEARLY EVERY DAY
SUM OF ALL RESPONSES TO PHQ QUESTIONS 1-9: 22
10. IF YOU CHECKED OFF ANY PROBLEMS, HOW DIFFICULT HAVE THESE PROBLEMS MADE IT FOR YOU TO DO YOUR WORK, TAKE CARE OF THINGS AT HOME, OR GET ALONG WITH OTHER PEOPLE: VERY DIFFICULT
6. FEELING BAD ABOUT YOURSELF - OR THAT YOU ARE A FAILURE OR HAVE LET YOURSELF OR YOUR FAMILY DOWN: NEARLY EVERY DAY
5. POOR APPETITE OR OVEREATING: NEARLY EVERY DAY
SUM OF ALL RESPONSES TO PHQ9 QUESTIONS 1 & 2: 6
9. THOUGHTS THAT YOU WOULD BE BETTER OFF DEAD, OR OF HURTING YOURSELF: NOT AT ALL
6. FEELING BAD ABOUT YOURSELF - OR THAT YOU ARE A FAILURE OR HAVE LET YOURSELF OR YOUR FAMILY DOWN: NEARLY EVERY DAY
4. FEELING TIRED OR HAVING LITTLE ENERGY: NEARLY EVERY DAY
7. TROUBLE CONCENTRATING ON THINGS, SUCH AS READING THE NEWSPAPER OR WATCHING TELEVISION: NEARLY EVERY DAY
9. THOUGHTS THAT YOU WOULD BE BETTER OFF DEAD, OR OF HURTING YOURSELF: NOT AT ALL
8. MOVING OR SPEAKING SO SLOWLY THAT OTHER PEOPLE COULD HAVE NOTICED. OR THE OPPOSITE - BEING SO FIDGETY OR RESTLESS THAT YOU HAVE BEEN MOVING AROUND A LOT MORE THAN USUAL: SEVERAL DAYS
2. FEELING DOWN, DEPRESSED OR HOPELESS: NEARLY EVERY DAY
1. LITTLE INTEREST OR PLEASURE IN DOING THINGS: NEARLY EVERY DAY
3. TROUBLE FALLING OR STAYING ASLEEP: NEARLY EVERY DAY
8. MOVING OR SPEAKING SO SLOWLY THAT OTHER PEOPLE COULD HAVE NOTICED. OR THE OPPOSITE, BEING SO FIGETY OR RESTLESS THAT YOU HAVE BEEN MOVING AROUND A LOT MORE THAN USUAL: SEVERAL DAYS
7. TROUBLE CONCENTRATING ON THINGS, SUCH AS READING THE NEWSPAPER OR WATCHING TELEVISION: NEARLY EVERY DAY
2. FEELING DOWN, DEPRESSED OR HOPELESS: NEARLY EVERY DAY

## 2025-06-11 ASSESSMENT — COLUMBIA-SUICIDE SEVERITY RATING SCALE - C-SSRS
1. IN THE PAST MONTH, HAVE YOU WISHED YOU WERE DEAD OR WISHED YOU COULD GO TO SLEEP AND NOT WAKE UP?: NO
2. HAVE YOU ACTUALLY HAD ANY THOUGHTS OF KILLING YOURSELF?: NO

## 2025-06-11 ASSESSMENT — ANXIETY QUESTIONNAIRES
IF YOU CHECKED OFF ANY PROBLEMS ON THIS QUESTIONNAIRE, HOW DIFFICULT HAVE THESE PROBLEMS MADE IT FOR YOU TO DO YOUR WORK, TAKE CARE OF THINGS AT HOME, OR GET ALONG WITH OTHER PEOPLE: VERY DIFFICULT
5. BEING SO RESTLESS THAT IT IS HARD TO SIT STILL: MORE THAN HALF THE DAYS
4. TROUBLE RELAXING: NEARLY EVERY DAY
IF YOU CHECKED OFF ANY PROBLEMS ON THIS QUESTIONNAIRE, HOW DIFFICULT HAVE THESE PROBLEMS MADE IT FOR YOU TO DO YOUR WORK, TAKE CARE OF THINGS AT HOME, OR GET ALONG WITH OTHER PEOPLE: VERY DIFFICULT
1. FEELING NERVOUS, ANXIOUS, OR ON EDGE: NEARLY EVERY DAY
6. BECOMING EASILY ANNOYED OR IRRITABLE: NEARLY EVERY DAY
1. FEELING NERVOUS, ANXIOUS, OR ON EDGE: NEARLY EVERY DAY
7. FEELING AFRAID AS IF SOMETHING AWFUL MIGHT HAPPEN: NEARLY EVERY DAY
3. WORRYING TOO MUCH ABOUT DIFFERENT THINGS: NEARLY EVERY DAY
4. TROUBLE RELAXING: NEARLY EVERY DAY
2. NOT BEING ABLE TO STOP OR CONTROL WORRYING: NEARLY EVERY DAY
5. BEING SO RESTLESS THAT IT IS HARD TO SIT STILL: MORE THAN HALF THE DAYS
3. WORRYING TOO MUCH ABOUT DIFFERENT THINGS: NEARLY EVERY DAY
GAD7 TOTAL SCORE: 20
6. BECOMING EASILY ANNOYED OR IRRITABLE: NEARLY EVERY DAY
2. NOT BEING ABLE TO STOP OR CONTROL WORRYING: NEARLY EVERY DAY
7. FEELING AFRAID AS IF SOMETHING AWFUL MIGHT HAPPEN: NEARLY EVERY DAY

## 2025-06-11 ASSESSMENT — PAIN - FUNCTIONAL ASSESSMENT
PAIN_FUNCTIONAL_ASSESSMENT: 0-10

## 2025-06-11 ASSESSMENT — PAIN SCALES - GENERAL
PAINLEVEL_OUTOF10: 0 - NO PAIN

## 2025-06-11 NOTE — PROCEDURES
"Interventional Psychiatry Procedure    Date/Time: 6/11/2025 12:02 PM    Performed by: Gaby Wright DO  Authorized by: Gaby Wright DO      Intranasal Esketamine (SPRAVATO)    Esketamine: 56mg, Treatment #3    Subjective:  Procedure (Patient last ate (Date/Time): 6/11/25 930am/Any concerns regarding today's treatment: Yes, I want the lower dose of Spravato the 56mg./Any concerns regarding previous treatment: Yes \"I was throwing up and felt completely drained.\"             )    Pt reports that she felt very poorly with the 84mg dose after she got home. Reports nausea/vomiting and feeling as if she was in a \"black hole\" for the rest of the day with significant fatigue. Requests to return to 56mg dosing for the future and agreeable to trial of Zofran to see if this helps with any GI SE. Denies suicidal/homicidal ideation. Reports appetite is stable. Sleep is poor prior to Tx due to medication changes to accommodate SPRAVATO. Otherwise depression continues.  Reports significant flashbacks after Tx - agreeable to continue for now.  Interval changes in health: Some constipation - agreeable to trial of Miralax OTC   Interval changes in medications: Denies        Synopsis SmartLink 6/6/2025 6/9/2025 6/11/2025   Scores   Patient Health Questionnaire-9 Score 22  23  22    BH LOLI-7 Total Score 21 21  20        Patient-reported     Objective:  Mental Status Exam  General/Appearance: no distress, appears stated age, well kept  Attitude/Behavior: Cooperative, conversant, engaged, and with good eye contact.  Motor Activity: no psychomotor agitation or retardation, no tremor or other abnormal movements, gait: Within normal limits  Mood: \"Not good\"  Affect: Dysphoric, Restricted range, Mood congruent, and Appropriate to content  Speech: Spontaneous, Coherent, Fluent, Soft, Appropriate rate, and Appropriate quantity  Thought Process: linear, goal directed and logical, future-oriented  Thought Content: no suicidal ideation, no " homicidal ideation, delusions:none   Thought Perception: no perceptual abnormalities noted  Cognition: grossly intact  Insight: intact  Judgment: intact    Time out was taken with staff to confirm correct patient and correct procedure to be performed. Fall precautions in place throughout procedure. SpO2 continuously monitored throughout treatment. REMS eligibility confirmed.    Intranasal Esketamine was self-administered under direct RN supervision.    Vitals:    06/11/25 1101 06/11/25 1103 06/11/25 1223 06/11/25 1315   BP: 110/83 114/80 112/80 114/74   Pulse: 110 88 76 73   Resp: 16 16 16 16   Temp: 37.3 °C (99.1 °F)      TempSrc: Temporal      SpO2: 98% 97% 95% 96%    06/11/25 1319 06/11/25 1343   BP: 108/83 110/72   Pulse: 91 73   Resp: 16 16   Temp:     TempSrc:     SpO2: 95% 96%        Synopsis SmartLink 6/11/2025    11:01 6/11/2025    11:03 6/11/2025    12:23 6/11/2025    13:15 6/11/2025    13:19 6/11/2025    13:43   RASS   Andrews Agitation Sedation Scale 0 0 -1 -1 0 0       Patient tolerated treatment well and effects of medications had resolved prior to discharge. No complications. Patient was continuously monitored for 120 minutes. Discharge instructions were provided to patient with After Visit Summary. Patient was discharged home in the care of their designated .    Assessment:  1. Current severe episode of major depressive disorder without psychotic features without prior episode (Multi)      Currently not at goal  No immediate risk of harm to self or others    Plan:  Pt declined to continue Intranasal Esketamine. Pt was advised to call the clinic if she would like to resume SPRAVATO. Continue current antidepressant and other psychotropic medication regimen prescribed by outpatient provider and other mental health treatment with Dr. Dunn who was informed of change in Tx plan..         Gaby Wright DO

## 2025-06-11 NOTE — PATIENT INSTRUCTIONS
Try using Miralax (the generic name is Polyethylene glycol) every day to help with constipation. It is available over-the-counter.    Homegoing Instructions      Your Provider's Instructions:    Post-Treatment Instructions:    No alcohol for 24 hours.    Do not drive or operate machinery until the day after treatment and after a full night of sleep.    Do not make important decisions or sign legal documents for 24 hours.    A responsible adult should remain with you for 24 hours due to possible dizziness or sedation.    Resume a normal diet unless otherwise instructed.    When to Contact a Provider:    Contact your provider with any questions or concerns.    Call 911 for any emergencies.    Health Maintenance:    If you use tobacco, quitting is strongly recommended.  Resources:    American Heart Association: (734) 867-1157 or www.americanheart.org    [place] Tobacco Quit Line: 1-800-QUIT-NOW (1-339.732.5561)    [facility] of Health: www.[facility].[place].[facility]    For alcohol or drug use resources: Call [employer] at 211 or visit www.[employer].gov.    For safe medication disposal: Visit www.rxdrugdropbox.org or contact your local police department.      Instructions Before Next Spravato/Ketamine Appointment:      Arrange transportation to and from your appointment (friend, family member, or insurance-covered ride).    Avoid smoking, vaping, and nicotine use if possible.    No alcohol for 24 hours prior to your appointment.    Instructions for Day of Treatment:    Diet:  No solid food for 2 hours before appointment.    Clear liquids (water, tea, black coffee, clear juice, plain Jello, hard candy) are allowed up to 30 minutes before appointment. (No milk or cream.)    What to Bring:    Headphones for calming music or guided meditation.    Journal if desired.    Any prescribed inhalers.    Medications:    Take morning medications as prescribed, except:    No chlordiazepoxide (Librium), diazepam (Valium), or flurazepam  (Dalmane) within 24 hours before appointment.    No lamotrigine (Lamictal) within 12 hours before appointment.    Avoid anti-anxiety medications on the day of treatment unless absolutely necessary.    No stimulants prior to treatment (may resume after treatment).    Nasal sprays (for Spravato) can be used up to 1 hour before appointment.    Intentions:    Patients are encouraged to set a personal intention for each treatment session.    Cancellation Policy:    Please provide at least 24 hours’ notice for cancellations.    Three late cancellations or no-shows may require a treatment plan review with your outpatient psychiatrist before rescheduling.    If more than 15 minutes late, the appointment may need to be rescheduled.    Call [phone] to cancel or reschedule.

## 2025-06-12 ENCOUNTER — TELEPHONE (OUTPATIENT)
Dept: OBSTETRICS AND GYNECOLOGY | Facility: CLINIC | Age: 55
End: 2025-06-12
Payer: MEDICARE

## 2025-06-12 ENCOUNTER — TELEPHONE (OUTPATIENT)
Dept: PRIMARY CARE | Facility: CLINIC | Age: 55
End: 2025-06-12
Payer: MEDICARE

## 2025-06-12 NOTE — TELEPHONE ENCOUNTER
Patient is asking why she does not get a pap every year? Informed patient Dr. Graham did not do a pap on 6/3 according to office note, pap was negative in 2023.

## 2025-06-24 ENCOUNTER — APPOINTMENT (OUTPATIENT)
Dept: OTOLARYNGOLOGY | Facility: CLINIC | Age: 55
End: 2025-06-24
Payer: MEDICARE

## 2025-06-24 VITALS — HEIGHT: 63 IN | WEIGHT: 138 LBS | BODY MASS INDEX: 24.45 KG/M2

## 2025-06-24 DIAGNOSIS — R49.0 HOARSENESS OF VOICE: Primary | ICD-10-CM

## 2025-06-24 DIAGNOSIS — J38.3 GLOTTIC INSUFFICIENCY: ICD-10-CM

## 2025-06-24 DIAGNOSIS — J38.01 COMPLETE PARALYSIS OF LEFT VOCAL CORD: ICD-10-CM

## 2025-06-24 ASSESSMENT — PATIENT HEALTH QUESTIONNAIRE - PHQ9
1. LITTLE INTEREST OR PLEASURE IN DOING THINGS: NOT AT ALL
SUM OF ALL RESPONSES TO PHQ9 QUESTIONS 1 AND 2: 0
2. FEELING DOWN, DEPRESSED OR HOPELESS: NOT AT ALL

## 2025-06-24 NOTE — PROGRESS NOTES
Chief Complaint  Chief Complaint   Patient presents with    Post-op Visit     #1        Pertinent History:  for chronic hoarseness and vocal cord polyps s/p excision in .   Underwent injection in the OR on 2024.     Interval History (2025):   She is s/p MDL with vocal cord injection  of restylane and saline to the left vocal cord immobility for gap and sulcus on 2025.  She is doing well. Her voice is improved with decreased effort and intelligibility. No postoperative complications. She is potentially interested in a long term procedure if she should need this again.     Exam:  VOICE: Persistent mild hoarseness   RESPIRATION: Breathing comfortably, no stridor.    ORAL CAVITY/OROPHARYNX/LIPS: Normal mucous membranes, normal floor of mouth/tongue/OP, no masses or lesions are noted.    SKIN: Neck skin is without scar or injury.    PSYCH: Alert and oriented with appropriate mood and affect.       PROCEDURE NOTE:  Recommended flexible laryngoscopy.  Risks, benefits,  and alternatives were explained. They wished to proceed and provides verbal consent.     PROCEDURE:  Flexible Laryngoscopy, CPT 32918    POSTPROCEDURE DIAGNOSIS: Voice     INDICATIONS: Inability to tolerate mirror exam or abnormal findings on mirror, Flexible Laryngoscopy performed to assess one of the followin. Diagnosis of symptomatic disorder involving the voice, swallow, upper aerodigestive tract, including CAREN disorders, or  2. Preoperative evaluation of vocal cord function for individuals undergoing surgery where the RLN or vagus nerves are at risk of injury, or  3. Further evaluation of abnormalities of the upper aerodigestive tract discovered by another modality, such as CT, MRI, bronchoscopy or EGD    Description of Procedure:    After adequate afrin and lidocaine spray, I advanced the endoscope.  Visualization of the nasopharynx, vallecula, posterior pharyngeal walls, pyriform, epiglottis and post cricoid areas was  unremarkable.  The following laryngeal findings were noted:    vocal cord movement was asymmetric, left is immobile   closure was complete with improved glottal gap  Compression was minimal   edema was none  interarytenoid edema none   lesions were none   the subglottis was widely patent  Pharyngeal wall squeeze was normal  Sulcus less evident  Procedure well tolerated.     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      Today's exam showed lessened compression and effort. She has an improved voice. The left TVC has persistent, stable and chronic immobility. Options of long term solution were discussed in depth.  She does not want an open procedure.  Other potentially long term injectables are stiffer and may not provide her as smooth a response as she currently has.  The pros and cons were outlined. She demonstrated understanding and agreed to see how things were down the road and rediscuss.    We discussed:  Longer term injectable could be considered if and when she needs.  For now we will follow expectantly.  It is likely that I would recommend a fu restylane and saline    The patient's questions were answered.    Scribe Attestation  By signing my name below, I, Crista Pool attest that this documentation has been prepared under the direction and in the presence of Lois Kenney MD.

## 2025-06-25 ENCOUNTER — HOSPITAL ENCOUNTER (OUTPATIENT)
Dept: OPERATING ROOM | Facility: CLINIC | Age: 55
Setting detail: OUTPATIENT SURGERY
Discharge: HOME | End: 2025-06-25
Payer: OTHER MISCELLANEOUS

## 2025-06-25 VITALS
SYSTOLIC BLOOD PRESSURE: 130 MMHG | BODY MASS INDEX: 24.27 KG/M2 | RESPIRATION RATE: 16 BRPM | WEIGHT: 137 LBS | OXYGEN SATURATION: 96 % | TEMPERATURE: 97.2 F | HEART RATE: 72 BPM | HEIGHT: 63 IN | DIASTOLIC BLOOD PRESSURE: 62 MMHG

## 2025-06-25 DIAGNOSIS — M46.1 BILATERAL SACROILIITIS: ICD-10-CM

## 2025-06-25 PROCEDURE — 2500000004 HC RX 250 GENERAL PHARMACY W/ HCPCS (ALT 636 FOR OP/ED): Performed by: PHYSICAL MEDICINE & REHABILITATION

## 2025-06-25 PROCEDURE — 2550000001 HC RX 255 CONTRASTS: Mod: JW | Performed by: PHYSICAL MEDICINE & REHABILITATION

## 2025-06-25 PROCEDURE — 7100000010 HC PHASE TWO TIME - EACH INCREMENTAL 1 MINUTE

## 2025-06-25 PROCEDURE — 3600000001 HC OR TIME - INITIAL BASE CHARGE - PROCEDURE LEVEL ONE

## 2025-06-25 PROCEDURE — 27096 INJECT SACROILIAC JOINT: CPT | Performed by: PHYSICAL MEDICINE & REHABILITATION

## 2025-06-25 PROCEDURE — 3600000006 HC OR TIME - EACH INCREMENTAL 1 MINUTE - PROCEDURE LEVEL ONE

## 2025-06-25 PROCEDURE — 7100000009 HC PHASE TWO TIME - INITIAL BASE CHARGE

## 2025-06-25 RX ORDER — LIDOCAINE HYDROCHLORIDE 5 MG/ML
INJECTION, SOLUTION INFILTRATION; INTRAVENOUS AS NEEDED
Status: COMPLETED | OUTPATIENT
Start: 2025-06-25 | End: 2025-06-25

## 2025-06-25 RX ORDER — DEXAMETHASONE SODIUM PHOSPHATE 10 MG/ML
INJECTION INTRAMUSCULAR; INTRAVENOUS AS NEEDED
Status: COMPLETED | OUTPATIENT
Start: 2025-06-25 | End: 2025-06-25

## 2025-06-25 RX ORDER — FENTANYL CITRATE 50 UG/ML
INJECTION, SOLUTION INTRAMUSCULAR; INTRAVENOUS AS NEEDED
Status: COMPLETED | OUTPATIENT
Start: 2025-06-25 | End: 2025-06-25

## 2025-06-25 RX ADMIN — LIDOCAINE HYDROCHLORIDE 10 ML: 5 INJECTION, SOLUTION INFILTRATION at 09:43

## 2025-06-25 RX ADMIN — DEXAMETHASONE SODIUM PHOSPHATE 10 MG: 10 INJECTION, SOLUTION INTRAMUSCULAR; INTRAVENOUS at 09:44

## 2025-06-25 RX ADMIN — FENTANYL CITRATE 100 MCG: 50 INJECTION, SOLUTION INTRAMUSCULAR; INTRAVENOUS at 09:39

## 2025-06-25 RX ADMIN — IOHEXOL 5 ML: 240 INJECTION, SOLUTION INTRATHECAL; INTRAVASCULAR; INTRAVENOUS; ORAL at 09:44

## 2025-06-25 ASSESSMENT — PAIN SCALES - GENERAL
PAINLEVEL_OUTOF10: 0 - NO PAIN
PAINLEVEL_OUTOF10: 6
PAINLEVEL_OUTOF10: 0 - NO PAIN

## 2025-06-25 ASSESSMENT — PAIN - FUNCTIONAL ASSESSMENT: PAIN_FUNCTIONAL_ASSESSMENT: 0-10

## 2025-06-25 NOTE — PRE-SEDATION DOCUMENTATION
Last time ate or drank LN   Mouth opening  4  cm  Dentures -  Soft palate Uvula and amygdala visible   TCD - digits  Rom flexion to 50 degrees  ext to 40 degrees   No Current Cardio pulmonary symptoms   Low risk for IV anxiolysis

## 2025-06-25 NOTE — H&P
"History Of Present Illness  Suyapa Thao is a 54 y.o. female presenting with bilateral SIJ pain .     Past Medical History  Medical History[1]    Surgical History  Surgical History[2]     Social History  She reports that she has been smoking cigarettes. She has never been exposed to tobacco smoke. She has quit using smokeless tobacco. She reports that she does not currently use alcohol. She reports that she does not use drugs.    Family History  Family History[3]     Allergies  Patient has no known allergies.    Review of Systems   No Current Cardio pulmonary symptoms    Physical Exam   Heart regular breathing regular   SIJ loading positive bilaterally   Last Recorded Vitals  Blood pressure 111/68, pulse 65, temperature 36.8 °C (98.2 °F), temperature source Temporal, resp. rate 16, height 1.6 m (5' 3\"), weight 62.1 kg (137 lb), SpO2 98%.    Relevant Results  Medications Ordered Prior to Encounter[4]      Assessment & Plan  Bilateral sacroiliitis      Bilateral SIJ intraarticular steroid injection     I spent 10 minutes in the professional and overall care of this patient.      Daniele Elder MD         [1]   Past Medical History:  Diagnosis Date    ADHD (attention deficit hyperactivity disorder)     Adjustment disorder     Anxiety     Cervical disc disease 2014    Lower back and neck pain. My Dr. Daniele Elder at  has my records    Chronic pain disorder     COPD (chronic obstructive pulmonary disease) (Multi)     Depression     Hallucination     Hyperlipidemia     Memory loss     Panic attack     Post-traumatic stress disorder, unspecified 08/02/2022    PTSD (post-traumatic stress disorder)    Psychiatric illness     PTSD (post-traumatic stress disorder)     Sleep difficulties    [2]   Past Surgical History:  Procedure Laterality Date    AUGMENTATION MAMMAPLASTY  1995    INCONTINENCE SURGERY      OTHER SURGICAL HISTORY  03/29/2022    Vocal Cordectomy    OTHER SURGICAL HISTORY  03/29/2022    Breast surgery/ " augmentation   [3]   Family History  Problem Relation Name Age of Onset    Hypertension Mother Ayla thao     Heart disease Father Abel Thao     Diabetes Father Abel Thao     Hypertension Father Abel Thao    [4]   Current Outpatient Medications on File Prior to Encounter   Medication Sig Dispense Refill    ALPRAZolam (Niravam) 1 mg disintegrating tablet Dissolve 1 tablet (1 mg) in the mouth 2 times a day as needed for anxiety. 60 tablet 0    prazosin (Minipress) 5 mg capsule Take 1 capsule (5 mg) by mouth 2 times a day. For nightmares 180 capsule 3    acetaminophen (Tylenol) 500 mg tablet Take 2 tablets (1,000 mg) by mouth every 6 hours if needed for mild pain (1 - 3). 30 tablet 0    albuterol 90 mcg/actuation inhaler INHALE 2 PUFFS BY MOUTH EVERY 4 HOURS AS NEEDED FOR WHEEZING OR SHORTNESS OF BREATH 8.5 g 2    clonazePAM (KlonoPIN) 1 mg tablet Take 1 tablet (1 mg) by mouth once daily at bedtime. 90 tablet 0    escitalopram (Lexapro) 20 mg tablet Take 1 tablet (20 mg) by mouth once daily. 90 tablet 3    estradiol (Estrace) 0.01 % (0.1 mg/gram) vaginal cream Insert 0.5 Applicatorfuls (2 g) into the vagina once daily. After two weeks, reduce this to 1g vaginally twice weekly. 42.5 g 12    Incassia 0.35 mg tablet TAKE 1 TABLET DAILY 84 tablet 3    ketorolac (Toradol) 10 mg tablet Take 1 tablet (10 mg) by mouth every 6 hours if needed for moderate pain (4 - 6). 20 tablet 0    ospemifene (Osphena) 60 mg tablet Take 1 tablet by mouth once daily.      prasterone, dhea, (Intrarosa) 6.5 mg insert Insert 6.5 mg into the vagina once daily. 30 each 3    QUEtiapine (SEROquel) 200 mg tablet Take 1 tablet (200 mg) by mouth once daily at bedtime. 90 tablet 3    risperiDONE (RisperDAL) 1 mg tablet Take 1 tablet (1 mg) by mouth 2 times a day. 180 tablet 1    risperiDONE (RisperDAL) 4 mg tablet Take 1 tablet (4 mg) by mouth once daily. Take 1 tablet  (2 mg) at bedtime, and 1/2 tablet (1 mg) in the morning. Thanks. 90 tablet 3     rosuvastatin (Crestor) 5 mg tablet TAKE 1 TABLET(5 MG) BY MOUTH DAILY 90 tablet 1    traMADol (Ultram) 50 mg tablet Take 1 tablet (50 mg) by mouth every 6 hours if needed for severe pain (7 - 10). 15 tablet 0    traZODone (Desyrel) 50 mg tablet Take 1 tablet (50 mg) by mouth once daily at bedtime. 30 tablet 0     No current facility-administered medications on file prior to encounter.

## 2025-06-25 NOTE — POST-PROCEDURE NOTE
Bilateral L5 sacro iliac joint intraarticular steroid injection     Time-in: 938 am   time-out: 941 am     Sedation: 100 mcg of IV fentanyl   Patient sedated but responsive to verbal commands. Monitoring of the vitals signs performed by qualified Registered Nurse during the entire procedure  Please see sedation record for sedation by RN.      Side:  bilatearl     Indication: Failure to respond to conservative treatment with therapy and medications.     PROCEDURE:    The risks, benefits and alternatives of the procedure were discussed with the patient and agreed to proceed. The risks included but not limited to: infection, bleeding, paralysis, nerve injury sepsis and remotely death were discussed with the patient during the office and again in the pre procedure area.  The patient signed informed consent in the pre procedure area.     The patient was brought to procedure room and time out for the procedure was performed with the procedure room staff present.    Patient placed in prone position on the procedure table and draped and covered appropriately.      Fluoroscopy machine was used to identify the lumbo sacral area over the  R SIJ    Skin was prepped and draped in sterile fashion over the SIJ area.  Skin was infiltrated with local anesthetic with lidocaien 0.5%    Spinal needle #25 was introduced to the lower end of the SIJ, tip of the needle position was verified with adequate flow of contrast dye 0.5cc of Isovue in the joint space.  At that point, 10 mg dexamethasone mixed with 1 mL of lidocaine 0.5% were injected.      Same procedure repeated on the left SIJ with 10 mg of dexamethasone  mixed with 1 cc of lidocaine 0.5 % injected in the left sacro iliac joint     Procedure tolerated very well.  No complications encountered.  Post procedure care discussed with the patient, agreed to proceed.   Patient instructed on keeping track of the pain level after discharge.   Patient discharged home, from the recovery  room, in stable condition.

## 2025-06-25 NOTE — DISCHARGE INSTRUCTIONS
Discharge Instructions for Anesthesiology Pain Service Patients - Dr. Elder    Observe the needle site for excessive bleeding (slow general oozing that completely soaks the dressing or fresh bright red bleeding).  In either case, apply pressure to the area, elevate it if possible and call your doctor at once.    Observe/monitor for the following signs and symptoms:         Needle site:  change in color, numbness or tingling, coldness to touch, swelling, drainage       Temperature of 101.5 or higher       Increased or uncontrollable pain    If you notice any of the above signs or symptoms, please call your doctor at once.    Your pain may not be gone immediately after this procedure; it generally takes 3 to 5 days for the steroid to work.    Keep the needle site clean and dry for 24 hours.    Continue your present medications.    No driving or operating machinery for 24 hours if you have received sedation.    Make an appointment to see you doctor in 2-3 weeks    Central Scheduling Number:  [phone]  After hours Pain Management:  [phone].    If any problems occur, or if you have further questions, please call you doctor as soon as possible.  If you find that you cannot reach your doctor, but feel that your condition needs a doctors attention, go to the  emergency room nearest your home.

## 2025-06-26 ASSESSMENT — PAIN SCALES - GENERAL: PAINLEVEL_OUTOF10: 5 - MODERATE PAIN

## 2025-06-27 DIAGNOSIS — F32.2 CURRENT SEVERE EPISODE OF MAJOR DEPRESSIVE DISORDER WITHOUT PSYCHOTIC FEATURES, UNSPECIFIED WHETHER RECURRENT (MULTI): Primary | ICD-10-CM

## 2025-07-02 ENCOUNTER — HOSPITAL ENCOUNTER (OUTPATIENT)
Facility: HOSPITAL | Age: 55
Discharge: HOME | End: 2025-07-02
Payer: OTHER MISCELLANEOUS

## 2025-07-02 VITALS
TEMPERATURE: 98.8 F | RESPIRATION RATE: 18 BRPM | OXYGEN SATURATION: 97 % | SYSTOLIC BLOOD PRESSURE: 117 MMHG | DIASTOLIC BLOOD PRESSURE: 78 MMHG | HEART RATE: 69 BPM

## 2025-07-02 DIAGNOSIS — F32.2 CURRENT SEVERE EPISODE OF MAJOR DEPRESSIVE DISORDER WITHOUT PSYCHOTIC FEATURES WITHOUT PRIOR EPISODE (MULTI): Primary | ICD-10-CM

## 2025-07-02 RX ORDER — DIPHENHYDRAMINE HCL 50 MG
50 CAPSULE ORAL ONCE AS NEEDED
Status: DISCONTINUED | OUTPATIENT
Start: 2025-07-02 | End: 2025-07-03 | Stop reason: HOSPADM

## 2025-07-02 RX ORDER — EPINEPHRINE 1 MG/ML
0.3 INJECTION, SOLUTION, CONCENTRATE INTRAVENOUS EVERY 5 MIN PRN
Status: DISCONTINUED | OUTPATIENT
Start: 2025-07-02 | End: 2025-07-03 | Stop reason: HOSPADM

## 2025-07-02 RX ORDER — DIPHENHYDRAMINE HYDROCHLORIDE 50 MG/ML
50 INJECTION, SOLUTION INTRAMUSCULAR; INTRAVENOUS AS NEEDED
Status: DISCONTINUED | OUTPATIENT
Start: 2025-07-02 | End: 2025-07-03 | Stop reason: HOSPADM

## 2025-07-02 RX ORDER — ALBUTEROL SULFATE 0.83 MG/ML
3 SOLUTION RESPIRATORY (INHALATION) AS NEEDED
Status: DISCONTINUED | OUTPATIENT
Start: 2025-07-02 | End: 2025-07-03 | Stop reason: HOSPADM

## 2025-07-02 RX ORDER — LABETALOL 100 MG/1
100 TABLET, FILM COATED ORAL AS NEEDED
Status: DISCONTINUED | OUTPATIENT
Start: 2025-07-02 | End: 2025-07-03 | Stop reason: HOSPADM

## 2025-07-02 RX ORDER — ALBUTEROL SULFATE 0.83 MG/ML
3 SOLUTION RESPIRATORY (INHALATION) AS NEEDED
OUTPATIENT
Start: 2025-07-04

## 2025-07-02 RX ORDER — ACETAMINOPHEN 325 MG/1
975 TABLET ORAL ONCE AS NEEDED
OUTPATIENT
Start: 2025-07-04

## 2025-07-02 RX ORDER — DIPHENHYDRAMINE HYDROCHLORIDE 50 MG/ML
50 INJECTION, SOLUTION INTRAMUSCULAR; INTRAVENOUS AS NEEDED
OUTPATIENT
Start: 2025-07-04

## 2025-07-02 RX ORDER — EPINEPHRINE 1 MG/ML
0.3 INJECTION, SOLUTION, CONCENTRATE INTRAVENOUS EVERY 5 MIN PRN
OUTPATIENT
Start: 2025-07-04

## 2025-07-02 RX ORDER — ACETAMINOPHEN 325 MG/1
975 TABLET ORAL ONCE AS NEEDED
Status: DISCONTINUED | OUTPATIENT
Start: 2025-07-02 | End: 2025-07-03 | Stop reason: HOSPADM

## 2025-07-02 RX ORDER — DIPHENHYDRAMINE HCL 50 MG
50 CAPSULE ORAL ONCE AS NEEDED
OUTPATIENT
Start: 2025-07-04

## 2025-07-02 RX ORDER — ONDANSETRON 4 MG/1
4 TABLET, FILM COATED ORAL ONCE AS NEEDED
Status: DISCONTINUED | OUTPATIENT
Start: 2025-07-02 | End: 2025-07-03 | Stop reason: HOSPADM

## 2025-07-02 RX ORDER — FAMOTIDINE 10 MG/ML
20 INJECTION, SOLUTION INTRAVENOUS ONCE AS NEEDED
Status: DISCONTINUED | OUTPATIENT
Start: 2025-07-02 | End: 2025-07-03 | Stop reason: HOSPADM

## 2025-07-02 RX ORDER — ONDANSETRON 4 MG/1
4 TABLET, FILM COATED ORAL ONCE AS NEEDED
OUTPATIENT
Start: 2025-07-04

## 2025-07-02 RX ORDER — FAMOTIDINE 10 MG/ML
20 INJECTION, SOLUTION INTRAVENOUS ONCE AS NEEDED
OUTPATIENT
Start: 2025-07-04

## 2025-07-02 RX ORDER — LABETALOL 100 MG/1
100 TABLET, FILM COATED ORAL AS NEEDED
OUTPATIENT
Start: 2025-07-04

## 2025-07-02 ASSESSMENT — PATIENT HEALTH QUESTIONNAIRE - PHQ9
9. THOUGHTS THAT YOU WOULD BE BETTER OFF DEAD, OR OF HURTING YOURSELF: NOT AT ALL
7. TROUBLE CONCENTRATING ON THINGS, SUCH AS READING THE NEWSPAPER OR WATCHING TELEVISION: NEARLY EVERY DAY
SUM OF ALL RESPONSES TO PHQ QUESTIONS 1-9: 24
10. IF YOU CHECKED OFF ANY PROBLEMS, HOW DIFFICULT HAVE THESE PROBLEMS MADE IT FOR YOU TO DO YOUR WORK, TAKE CARE OF THINGS AT HOME, OR GET ALONG WITH OTHER PEOPLE: EXTREMELY DIFFICULT
4. FEELING TIRED OR HAVING LITTLE ENERGY: NEARLY EVERY DAY
5. POOR APPETITE OR OVEREATING: NEARLY EVERY DAY
SUM OF ALL RESPONSES TO PHQ9 QUESTIONS 1 & 2: 6
8. MOVING OR SPEAKING SO SLOWLY THAT OTHER PEOPLE COULD HAVE NOTICED. OR THE OPPOSITE, BEING SO FIGETY OR RESTLESS THAT YOU HAVE BEEN MOVING AROUND A LOT MORE THAN USUAL: NEARLY EVERY DAY
1. LITTLE INTEREST OR PLEASURE IN DOING THINGS: NEARLY EVERY DAY
3. TROUBLE FALLING OR STAYING ASLEEP: NEARLY EVERY DAY
1. LITTLE INTEREST OR PLEASURE IN DOING THINGS: NEARLY EVERY DAY
10. IF YOU CHECKED OFF ANY PROBLEMS, HOW DIFFICULT HAVE THESE PROBLEMS MADE IT FOR YOU TO DO YOUR WORK, TAKE CARE OF THINGS AT HOME, OR GET ALONG WITH OTHER PEOPLE: EXTREMELY DIFFICULT
6. FEELING BAD ABOUT YOURSELF - OR THAT YOU ARE A FAILURE OR HAVE LET YOURSELF OR YOUR FAMILY DOWN: NEARLY EVERY DAY
5. POOR APPETITE OR OVEREATING: NEARLY EVERY DAY
3. TROUBLE FALLING OR STAYING ASLEEP OR SLEEPING TOO MUCH: NEARLY EVERY DAY
8. MOVING OR SPEAKING SO SLOWLY THAT OTHER PEOPLE COULD HAVE NOTICED. OR THE OPPOSITE - BEING SO FIDGETY OR RESTLESS THAT YOU HAVE BEEN MOVING AROUND A LOT MORE THAN USUAL: NEARLY EVERY DAY
6. FEELING BAD ABOUT YOURSELF - OR THAT YOU ARE A FAILURE OR HAVE LET YOURSELF OR YOUR FAMILY DOWN: NEARLY EVERY DAY
4. FEELING TIRED OR HAVING LITTLE ENERGY: NEARLY EVERY DAY
7. TROUBLE CONCENTRATING ON THINGS, SUCH AS READING THE NEWSPAPER OR WATCHING TELEVISION: NEARLY EVERY DAY
2. FEELING DOWN, DEPRESSED OR HOPELESS: NEARLY EVERY DAY
9. THOUGHTS THAT YOU WOULD BE BETTER OFF DEAD, OR OF HURTING YOURSELF: NOT AT ALL
2. FEELING DOWN, DEPRESSED OR HOPELESS: NEARLY EVERY DAY

## 2025-07-02 ASSESSMENT — ANXIETY QUESTIONNAIRES
4. TROUBLE RELAXING: NEARLY EVERY DAY
IF YOU CHECKED OFF ANY PROBLEMS ON THIS QUESTIONNAIRE, HOW DIFFICULT HAVE THESE PROBLEMS MADE IT FOR YOU TO DO YOUR WORK, TAKE CARE OF THINGS AT HOME, OR GET ALONG WITH OTHER PEOPLE: EXTREMELY DIFFICULT
5. BEING SO RESTLESS THAT IT IS HARD TO SIT STILL: MORE THAN HALF THE DAYS
1. FEELING NERVOUS, ANXIOUS, OR ON EDGE: NEARLY EVERY DAY
6. BECOMING EASILY ANNOYED OR IRRITABLE: NEARLY EVERY DAY
GAD7 TOTAL SCORE: 20
2. NOT BEING ABLE TO STOP OR CONTROL WORRYING: NEARLY EVERY DAY
7. FEELING AFRAID AS IF SOMETHING AWFUL MIGHT HAPPEN: NEARLY EVERY DAY
3. WORRYING TOO MUCH ABOUT DIFFERENT THINGS: NEARLY EVERY DAY
6. BECOMING EASILY ANNOYED OR IRRITABLE: NEARLY EVERY DAY
7. FEELING AFRAID AS IF SOMETHING AWFUL MIGHT HAPPEN: NEARLY EVERY DAY
2. NOT BEING ABLE TO STOP OR CONTROL WORRYING: NEARLY EVERY DAY
5. BEING SO RESTLESS THAT IT IS HARD TO SIT STILL: MORE THAN HALF THE DAYS
1. FEELING NERVOUS, ANXIOUS, OR ON EDGE: NEARLY EVERY DAY
IF YOU CHECKED OFF ANY PROBLEMS ON THIS QUESTIONNAIRE, HOW DIFFICULT HAVE THESE PROBLEMS MADE IT FOR YOU TO DO YOUR WORK, TAKE CARE OF THINGS AT HOME, OR GET ALONG WITH OTHER PEOPLE: EXTREMELY DIFFICULT
3. WORRYING TOO MUCH ABOUT DIFFERENT THINGS: NEARLY EVERY DAY
4. TROUBLE RELAXING: NEARLY EVERY DAY

## 2025-07-02 ASSESSMENT — PAIN - FUNCTIONAL ASSESSMENT
PAIN_FUNCTIONAL_ASSESSMENT: 0-10

## 2025-07-02 ASSESSMENT — PAIN SCALES - GENERAL
PAINLEVEL_OUTOF10: 0 - NO PAIN

## 2025-07-02 ASSESSMENT — ENCOUNTER SYMPTOMS
DEPRESSION: 1
OCCASIONAL FEELINGS OF UNSTEADINESS: 0
LOSS OF SENSATION IN FEET: 0

## 2025-07-02 NOTE — NURSING NOTE
"Around 1145am patient needed to go to the restroom. Patient was a little unsteady on her feet. I escorted patient to restroom. Patient wanted shade up but asked me \"there are no snipers?'.  I told patient there are no snipers. Patient continued to look out the window and ask me about snipers. I informed Dr. Wright of what patient said. Patient's vitals were HR-69, Pulse ox-97, BP-117/78. At time of discharge.   "

## 2025-07-02 NOTE — PROCEDURES
"Interventional Psychiatry Procedure    Date/Time: 7/2/2025 11:32 AM    Performed by: Gbay Wright DO  Authorized by: Gaby Wright DO      Intranasal Esketamine (SPRAVATO)    Esketamine: 56mg, Treatment #4    Subjective:  Procedure (Patient last ate (Date/Time): 07/01/25 9pm patient ate a granola bar at 8am this morning /Any concerns regarding today's treatment: No/Any concerns regarding previous treatment: No             )    Pt reports mood has continued to be depressed - son would like her to continue Spravato so she is here for another Tx. States she would like to stay with 56mg dose as is more tolerable. Denies SI/HI/AVH. Agreeable to continuing 2x/week per protocol, but unable to return next week due to prior planned travel. Will start 2x/week next on 7/16.  Interval changes in health: Denies   Interval changes in medications: Denies        Synopsis SmartLink 6/6/2025 6/9/2025 6/11/2025 7/2/2025   Scores   Patient Health Questionnaire-9 Score 22  23  22  24    BH LOLI-7 Total Score 21 21  20  20        Patient-reported     Objective:  Mental Status Exam  General/Appearance: no distress, appears stated age, well kept  Attitude/Behavior: Cooperative, conversant, engaged, and with good eye contact.  Motor Activity: no psychomotor agitation or retardation, no tremor or other abnormal movements, gait: Within normal limits  Mood: \"The same, not good\"  Affect: Dysphoric, Restricted range, Mood congruent, and Appropriate to content  Speech: Spontaneous, Coherent, Fluent, Soft, Appropriate rate, and Appropriate quantity  Thought Process: linear, goal directed and logical, future-oriented  Thought Content: no suicidal ideation, no homicidal ideation, delusions:none   Thought Perception: no perceptual abnormalities noted  Cognition: grossly intact  Insight: intact  Judgment: intact    Time out was taken with staff to confirm correct patient and correct procedure to be performed. Fall precautions in place throughout " procedure. SpO2 continuously monitored throughout treatment. REMS eligibility confirmed.    Intranasal Esketamine was self-administered under direct RN supervision.    Vitals:    07/02/25 1112 07/02/25 1152 07/02/25 1331   BP: 110/82 121/83 117/78   Pulse: 93 87 69   Resp: 16 18 18   Temp: 37.1 °C (98.8 °F)     TempSrc: Temporal     SpO2: 96% 97% 97%        Synopsis SmartLink 7/2/2025    11:12 7/2/2025    11:52 7/2/2025    13:31   RASS   Andrews Agitation Sedation Scale 0 -1 0       Patient tolerated treatment well and effects of medications had resolved prior to end of monitoring. No complications. Patient was continuously monitored for 120 minutes. Discharge instructions were provided to patient with After Visit Summary. Patient was discharged home in the care of their designated .    Assessment:  1. Current severe episode of major depressive disorder without psychotic features without prior episode (Multi)      Currently not at goal  No immediate risk of harm to self or others    Plan:  Continue Intranasal Esketamine. Follow up in about 2 weeks (around 7/16/2025). Plan for 2 treatmetns that week. Continue current antidepressant and other psychotropic medication regimen prescribed by outpatient provider and other mental health treatment with Dr. Dunn.         Gaby Wright DO

## 2025-07-02 NOTE — PATIENT INSTRUCTIONS
Homegoing Instructions      Your Provider's Instructions:    Post-Treatment Instructions:    No alcohol for 24 hours.    Do not drive or operate machinery until the day after treatment and after a full night of sleep.    Do not make important decisions or sign legal documents for 24 hours.    A responsible adult should remain with you for 24 hours due to possible dizziness or sedation.    Resume a normal diet unless otherwise instructed.    When to Contact a Provider:    Contact your provider with any questions or concerns.    Call 911 for any emergencies.    Health Maintenance:    If you use tobacco, quitting is strongly recommended.  Resources:    American Heart Association: (285) 199-3019 or www.americanheart.org    [place] Tobacco Quit Line: 1-800-QUIT-NOW (1-720.884.1602)    [facility] of [facility]: www.[facility].[place].gov    For alcohol or drug use resources: Call [employer] at 211 or visit www.[employer].gov.    For safe medication disposal: Visit www.rxdrugdropbox.org or contact your local police department.      Instructions Before Next Spravato/Ketamine Appointment:      Arrange transportation to and from your appointment (friend, family member, or insurance-covered ride).    Avoid smoking, vaping, and nicotine use if possible.    No alcohol for 24 hours prior to your appointment.    Instructions for Day of Treatment:    Diet:  No solid food for 2 hours before appointment.    Clear liquids (water, tea, black coffee, clear juice, plain Jello, hard candy) are allowed up to 30 minutes before appointment. (No milk or cream.)    What to Bring:    Headphones for calming music or guided meditation.    Journal if desired.    Any prescribed inhalers.    Medications:    Take morning medications as prescribed, except:    No chlordiazepoxide (Librium), diazepam (Valium), or flurazepam (Dalmane) within 24 hours before appointment.    No lamotrigine (Lamictal) within 12 hours before appointment.    Avoid anti-anxiety  medications on the day of treatment unless absolutely necessary.    No stimulants prior to treatment (may resume after treatment).    Nasal sprays (for Spravato) can be used up to 1 hour before appointment.    Intentions:    Patients are encouraged to set a personal intention for each treatment session.    Cancellation Policy:    Please provide at least 24 hours’ notice for cancellations.    Three late cancellations or no-shows may require a treatment plan review with your outpatient psychiatrist before rescheduling.    If more than 15 minutes late, the appointment may need to be rescheduled.    Call [phone] to cancel or reschedule.

## 2025-07-11 ENCOUNTER — TELEPHONE (OUTPATIENT)
Facility: HOSPITAL | Age: 55
End: 2025-07-11

## 2025-07-16 ENCOUNTER — HOSPITAL ENCOUNTER (OUTPATIENT)
Facility: HOSPITAL | Age: 55
Discharge: HOME | End: 2025-07-16
Payer: MEDICARE

## 2025-07-16 VITALS
RESPIRATION RATE: 18 BRPM | TEMPERATURE: 96.3 F | DIASTOLIC BLOOD PRESSURE: 83 MMHG | SYSTOLIC BLOOD PRESSURE: 119 MMHG | OXYGEN SATURATION: 98 % | HEART RATE: 74 BPM

## 2025-07-16 DIAGNOSIS — F32.2 CURRENT SEVERE EPISODE OF MAJOR DEPRESSIVE DISORDER WITHOUT PSYCHOTIC FEATURES WITHOUT PRIOR EPISODE (MULTI): Primary | ICD-10-CM

## 2025-07-16 PROCEDURE — G2083 VISIT ESKETAMINE, > 56M: HCPCS | Performed by: STUDENT IN AN ORGANIZED HEALTH CARE EDUCATION/TRAINING PROGRAM

## 2025-07-16 RX ORDER — ONDANSETRON 4 MG/1
4 TABLET, FILM COATED ORAL ONCE AS NEEDED
OUTPATIENT
Start: 2025-07-18

## 2025-07-16 RX ORDER — DIPHENHYDRAMINE HCL 50 MG
50 CAPSULE ORAL ONCE AS NEEDED
Status: DISCONTINUED | OUTPATIENT
Start: 2025-07-16 | End: 2025-07-17 | Stop reason: HOSPADM

## 2025-07-16 RX ORDER — EPINEPHRINE 1 MG/ML
0.3 INJECTION, SOLUTION, CONCENTRATE INTRAVENOUS EVERY 5 MIN PRN
Status: DISCONTINUED | OUTPATIENT
Start: 2025-07-16 | End: 2025-07-17 | Stop reason: HOSPADM

## 2025-07-16 RX ORDER — DIPHENHYDRAMINE HYDROCHLORIDE 50 MG/ML
50 INJECTION, SOLUTION INTRAMUSCULAR; INTRAVENOUS AS NEEDED
OUTPATIENT
Start: 2025-07-18

## 2025-07-16 RX ORDER — FAMOTIDINE 10 MG/ML
20 INJECTION, SOLUTION INTRAVENOUS ONCE AS NEEDED
OUTPATIENT
Start: 2025-07-18

## 2025-07-16 RX ORDER — EPINEPHRINE 1 MG/ML
0.3 INJECTION, SOLUTION, CONCENTRATE INTRAVENOUS EVERY 5 MIN PRN
OUTPATIENT
Start: 2025-07-18

## 2025-07-16 RX ORDER — FAMOTIDINE 10 MG/ML
20 INJECTION, SOLUTION INTRAVENOUS ONCE AS NEEDED
Status: DISCONTINUED | OUTPATIENT
Start: 2025-07-16 | End: 2025-07-17 | Stop reason: HOSPADM

## 2025-07-16 RX ORDER — ALBUTEROL SULFATE 0.83 MG/ML
3 SOLUTION RESPIRATORY (INHALATION) AS NEEDED
Status: DISCONTINUED | OUTPATIENT
Start: 2025-07-16 | End: 2025-07-17 | Stop reason: HOSPADM

## 2025-07-16 RX ORDER — DIPHENHYDRAMINE HYDROCHLORIDE 50 MG/ML
50 INJECTION, SOLUTION INTRAMUSCULAR; INTRAVENOUS AS NEEDED
Status: DISCONTINUED | OUTPATIENT
Start: 2025-07-16 | End: 2025-07-17 | Stop reason: HOSPADM

## 2025-07-16 RX ORDER — ALBUTEROL SULFATE 0.83 MG/ML
3 SOLUTION RESPIRATORY (INHALATION) AS NEEDED
OUTPATIENT
Start: 2025-07-18

## 2025-07-16 RX ORDER — ACETAMINOPHEN 325 MG/1
975 TABLET ORAL ONCE AS NEEDED
Status: DISCONTINUED | OUTPATIENT
Start: 2025-07-16 | End: 2025-07-17 | Stop reason: HOSPADM

## 2025-07-16 RX ORDER — ONDANSETRON 4 MG/1
4 TABLET, FILM COATED ORAL ONCE AS NEEDED
Status: DISCONTINUED | OUTPATIENT
Start: 2025-07-16 | End: 2025-07-17 | Stop reason: HOSPADM

## 2025-07-16 RX ORDER — LABETALOL 100 MG/1
100 TABLET, FILM COATED ORAL AS NEEDED
Status: DISCONTINUED | OUTPATIENT
Start: 2025-07-16 | End: 2025-07-17 | Stop reason: HOSPADM

## 2025-07-16 RX ORDER — DIPHENHYDRAMINE HCL 50 MG
50 CAPSULE ORAL ONCE AS NEEDED
OUTPATIENT
Start: 2025-07-18

## 2025-07-16 RX ORDER — TRAZODONE HYDROCHLORIDE 50 MG/1
50 TABLET ORAL NIGHTLY
Qty: 30 TABLET | Refills: 0 | Status: SHIPPED | OUTPATIENT
Start: 2025-07-16

## 2025-07-16 RX ORDER — ACETAMINOPHEN 325 MG/1
975 TABLET ORAL ONCE AS NEEDED
OUTPATIENT
Start: 2025-07-18

## 2025-07-16 RX ORDER — LABETALOL 100 MG/1
100 TABLET, FILM COATED ORAL AS NEEDED
OUTPATIENT
Start: 2025-07-18

## 2025-07-16 ASSESSMENT — PATIENT HEALTH QUESTIONNAIRE - PHQ9
2. FEELING DOWN, DEPRESSED OR HOPELESS: NEARLY EVERY DAY
5. POOR APPETITE OR OVEREATING: NEARLY EVERY DAY
1. LITTLE INTEREST OR PLEASURE IN DOING THINGS: NEARLY EVERY DAY
8. MOVING OR SPEAKING SO SLOWLY THAT OTHER PEOPLE COULD HAVE NOTICED. OR THE OPPOSITE - BEING SO FIDGETY OR RESTLESS THAT YOU HAVE BEEN MOVING AROUND A LOT MORE THAN USUAL: SEVERAL DAYS
6. FEELING BAD ABOUT YOURSELF - OR THAT YOU ARE A FAILURE OR HAVE LET YOURSELF OR YOUR FAMILY DOWN: NEARLY EVERY DAY
9. THOUGHTS THAT YOU WOULD BE BETTER OFF DEAD, OR OF HURTING YOURSELF: NOT AT ALL
5. POOR APPETITE OR OVEREATING: NEARLY EVERY DAY
8. MOVING OR SPEAKING SO SLOWLY THAT OTHER PEOPLE COULD HAVE NOTICED. OR THE OPPOSITE, BEING SO FIGETY OR RESTLESS THAT YOU HAVE BEEN MOVING AROUND A LOT MORE THAN USUAL: SEVERAL DAYS
4. FEELING TIRED OR HAVING LITTLE ENERGY: NEARLY EVERY DAY
7. TROUBLE CONCENTRATING ON THINGS, SUCH AS READING THE NEWSPAPER OR WATCHING TELEVISION: NEARLY EVERY DAY
SUM OF ALL RESPONSES TO PHQ QUESTIONS 1-9: 22
3. TROUBLE FALLING OR STAYING ASLEEP OR SLEEPING TOO MUCH: NEARLY EVERY DAY
1. LITTLE INTEREST OR PLEASURE IN DOING THINGS: NEARLY EVERY DAY
7. TROUBLE CONCENTRATING ON THINGS, SUCH AS READING THE NEWSPAPER OR WATCHING TELEVISION: NEARLY EVERY DAY
4. FEELING TIRED OR HAVING LITTLE ENERGY: NEARLY EVERY DAY
10. IF YOU CHECKED OFF ANY PROBLEMS, HOW DIFFICULT HAVE THESE PROBLEMS MADE IT FOR YOU TO DO YOUR WORK, TAKE CARE OF THINGS AT HOME, OR GET ALONG WITH OTHER PEOPLE: EXTREMELY DIFFICULT
6. FEELING BAD ABOUT YOURSELF - OR THAT YOU ARE A FAILURE OR HAVE LET YOURSELF OR YOUR FAMILY DOWN: NEARLY EVERY DAY
3. TROUBLE FALLING OR STAYING ASLEEP: NEARLY EVERY DAY
10. IF YOU CHECKED OFF ANY PROBLEMS, HOW DIFFICULT HAVE THESE PROBLEMS MADE IT FOR YOU TO DO YOUR WORK, TAKE CARE OF THINGS AT HOME, OR GET ALONG WITH OTHER PEOPLE: EXTREMELY DIFFICULT
SUM OF ALL RESPONSES TO PHQ9 QUESTIONS 1 & 2: 6
9. THOUGHTS THAT YOU WOULD BE BETTER OFF DEAD, OR OF HURTING YOURSELF: NOT AT ALL
2. FEELING DOWN, DEPRESSED OR HOPELESS: NEARLY EVERY DAY

## 2025-07-16 ASSESSMENT — ANXIETY QUESTIONNAIRES
3. WORRYING TOO MUCH ABOUT DIFFERENT THINGS: NEARLY EVERY DAY
GAD7 TOTAL SCORE: 20
3. WORRYING TOO MUCH ABOUT DIFFERENT THINGS: NEARLY EVERY DAY
IF YOU CHECKED OFF ANY PROBLEMS ON THIS QUESTIONNAIRE, HOW DIFFICULT HAVE THESE PROBLEMS MADE IT FOR YOU TO DO YOUR WORK, TAKE CARE OF THINGS AT HOME, OR GET ALONG WITH OTHER PEOPLE: EXTREMELY DIFFICULT
7. FEELING AFRAID AS IF SOMETHING AWFUL MIGHT HAPPEN: NEARLY EVERY DAY
4. TROUBLE RELAXING: NEARLY EVERY DAY
4. TROUBLE RELAXING: NEARLY EVERY DAY
6. BECOMING EASILY ANNOYED OR IRRITABLE: NEARLY EVERY DAY
5. BEING SO RESTLESS THAT IT IS HARD TO SIT STILL: MORE THAN HALF THE DAYS
2. NOT BEING ABLE TO STOP OR CONTROL WORRYING: NEARLY EVERY DAY
1. FEELING NERVOUS, ANXIOUS, OR ON EDGE: NEARLY EVERY DAY
2. NOT BEING ABLE TO STOP OR CONTROL WORRYING: NEARLY EVERY DAY
1. FEELING NERVOUS, ANXIOUS, OR ON EDGE: NEARLY EVERY DAY
6. BECOMING EASILY ANNOYED OR IRRITABLE: NEARLY EVERY DAY
IF YOU CHECKED OFF ANY PROBLEMS ON THIS QUESTIONNAIRE, HOW DIFFICULT HAVE THESE PROBLEMS MADE IT FOR YOU TO DO YOUR WORK, TAKE CARE OF THINGS AT HOME, OR GET ALONG WITH OTHER PEOPLE: EXTREMELY DIFFICULT
7. FEELING AFRAID AS IF SOMETHING AWFUL MIGHT HAPPEN: NEARLY EVERY DAY
5. BEING SO RESTLESS THAT IT IS HARD TO SIT STILL: MORE THAN HALF THE DAYS

## 2025-07-16 ASSESSMENT — PAIN - FUNCTIONAL ASSESSMENT
PAIN_FUNCTIONAL_ASSESSMENT: 0-10

## 2025-07-16 ASSESSMENT — PAIN SCALES - GENERAL
PAINLEVEL_OUTOF10: 0 - NO PAIN

## 2025-07-16 ASSESSMENT — ENCOUNTER SYMPTOMS
DEPRESSION: 1
OCCASIONAL FEELINGS OF UNSTEADINESS: 0
LOSS OF SENSATION IN FEET: 0

## 2025-07-16 NOTE — PROCEDURES
"Interventional Psychiatry Procedure    Date/Time: 7/16/2025 10:53 AM    Performed by: Gaby Wright DO  Authorized by: Gaby Wright DO      Intranasal Esketamine (SPRAVATO)    Esketamine: 84mg, Treatment #5    Subjective:  Procedure (Patient last ate (Date/Time): 7/15/25 10pm/Any concerns regarding today's treatment: No/Any concerns regarding previous treatment: No             )    Pt reports mood is \"the same\" and that she would like to trial higher dose again to see if it helps. Reports poor sleep maintenance. Denies suicidal/homicidal ideation. Would like information on Spravato closer to her home as transportation is a challenge. Agreeable to scheduling 7/23 with goal of 2x/week.  Interval changes in health: Denies   Interval changes in medications: Denies         6/6/2025 6/8/2025    00:00 7/2/2025 7/16/2025   Scores   Patient Health Questionnaire-9 Score 22   22.5 24  22    BH LOLI-7 Total Score 21  20.5 20  20        Patient-reported    Weekly remote patient monitoring average     Objective:  Mental Status Exam  General/Appearance: no distress, appears stated age, well kept  Attitude/Behavior: Cooperative, conversant, engaged, and with good eye contact.  Motor Activity: no psychomotor agitation or retardation, no tremor or other abnormal movements, gait: Within normal limits  Mood: \"The same\"  Affect: Dysphoric, Restricted range, Mood congruent, and Appropriate to content  Speech: Spontaneous, Coherent, Fluent, Soft, Appropriate rate, and Appropriate quantity  Thought Process: linear, goal directed and logical, future-oriented  Thought Content: no suicidal ideation, no homicidal ideation, delusions:none   Thought Perception: no perceptual abnormalities noted  Cognition: grossly intact  Insight: intact  Judgment: intact    Time out was taken with staff to confirm correct patient and correct procedure to be performed. Fall precautions in place throughout procedure. SpO2 continuously monitored throughout " treatment. REMS eligibility confirmed.    Intranasal Esketamine was self-administered under direct RN supervision.    Vitals:    07/16/25 1027 07/16/25 1133 07/16/25 1253   BP: 124/90 132/82 119/83   Pulse: 79 84 74   Resp: 18 18 18   Temp: 35.7 °C (96.3 °F)     TempSrc: Temporal     SpO2: 98% 98% 98%         7/16/2025    10:27 7/16/2025    11:33 7/16/2025    12:53   RASS   Andrews Agitation Sedation Scale 0 -1 0       Patient tolerated treatment well and effects of medications had resolved when reassessed prior to discharge. No complications. Patient was continuously monitored for 120 minutes. Discharge instructions were provided to patient with After Visit Summary. Patient was discharged home in the care of their designated .    Assessment:  1. Current severe episode of major depressive disorder without psychotic features without prior episode (Multi)      Currently not at goal  No immediate risk of harm to self or others    Plan:  Continue Intranasal Esketamine. Follow up in about 1 week (around 7/23/2025). Continue current antidepressant and other psychotropic medication regimen prescribed by outpatient provider and other mental health treatment with Dr. Dunn. Pt was provided with a refill for her Trazodone and a list of 3 treatment centers closer to her home - pt advised to coordinate with [facility] if would like to change treatment center.        Gaby Wright,

## 2025-07-16 NOTE — PATIENT INSTRUCTIONS
Homegoing Instructions      Your Provider's Instructions:    Post-Treatment Instructions:    No alcohol for 24 hours.    Do not drive or operate machinery until the day after treatment and after a full night of sleep.    Do not make important decisions or sign legal documents for 24 hours.    A responsible adult should remain with you for 24 hours due to possible dizziness or sedation.    Resume a normal diet unless otherwise instructed.    When to Contact a Provider:    Contact your provider with any questions or concerns.    Call 911 for any emergencies.    Health Maintenance:    If you use tobacco, quitting is strongly recommended.  Resources:    American Heart Association: (520) 653-3558 or www.americanheart.org    [place] Tobacco Quit Line: 1-800-QUIT-NOW (1-493.454.5704)    [facility] of Health: www.[facility].[place].gov    For alcohol or drug use resources: Call [employer] at 211 or visit www.[employer].gov.    For safe medication disposal: Visit www.rxdrugdropbox.org or contact your local police department.      Instructions Before Next Spravato/Ketamine Appointment:      Arrange transportation to and from your appointment (friend, family member, or insurance-covered ride).    Avoid smoking, vaping, and nicotine use if possible.    No alcohol for 24 hours prior to your appointment.    Instructions for Day of Treatment:    Diet:  No solid food for 2 hours before appointment.    Clear liquids (water, tea, black coffee, clear juice, plain Jello, hard candy) are allowed up to 30 minutes before appointment. (No milk or cream.)    What to Bring:    Headphones for calming music or guided meditation.    Journal if desired.    Any prescribed inhalers.    Medications:    Take morning medications as prescribed, except:    No chlordiazepoxide (Librium), diazepam (Valium), or flurazepam (Dalmane) within 24 hours before appointment.    No lamotrigine (Lamictal) within 12 hours before appointment.    Avoid anti-anxiety  medications on the day of treatment unless absolutely necessary.    No stimulants prior to treatment (may resume after treatment).    Nasal sprays (for Spravato) can be used up to 1 hour before appointment.    Intentions:    Patients are encouraged to set a personal intention for each treatment session.    Cancellation Policy:    Please provide at least 24 hours’ notice for cancellations.    Three late cancellations or no-shows may require a treatment plan review with your outpatient psychiatrist before rescheduling.    If more than 15 minutes late, the appointment may need to be rescheduled.    Call [phone] to cancel or reschedule.

## 2025-07-18 ENCOUNTER — APPOINTMENT (OUTPATIENT)
Facility: HOSPITAL | Age: 55
End: 2025-07-18
Payer: MEDICARE

## 2025-07-21 DIAGNOSIS — F33.2 MDD (MAJOR DEPRESSIVE DISORDER), RECURRENT SEVERE, WITHOUT PSYCHOSIS (MULTI): Primary | ICD-10-CM

## 2025-07-23 ENCOUNTER — SPECIALTY PHARMACY (OUTPATIENT)
Dept: PHARMACY | Facility: CLINIC | Age: 55
End: 2025-07-23

## 2025-07-23 ENCOUNTER — HOSPITAL ENCOUNTER (OUTPATIENT)
Facility: HOSPITAL | Age: 55
Discharge: HOME | End: 2025-07-23
Payer: OTHER MISCELLANEOUS

## 2025-07-23 VITALS
DIASTOLIC BLOOD PRESSURE: 68 MMHG | SYSTOLIC BLOOD PRESSURE: 117 MMHG | OXYGEN SATURATION: 98 % | TEMPERATURE: 96.9 F | RESPIRATION RATE: 18 BRPM | HEART RATE: 78 BPM

## 2025-07-23 DIAGNOSIS — F32.2 CURRENT SEVERE EPISODE OF MAJOR DEPRESSIVE DISORDER WITHOUT PSYCHOTIC FEATURES WITHOUT PRIOR EPISODE (MULTI): Primary | ICD-10-CM

## 2025-07-23 PROCEDURE — 2500000004 HC RX 250 GENERAL PHARMACY W/ HCPCS (ALT 636 FOR OP/ED): Performed by: STUDENT IN AN ORGANIZED HEALTH CARE EDUCATION/TRAINING PROGRAM

## 2025-07-23 PROCEDURE — S0013 ESKETAMINE, NASAL SPRAY: HCPCS | Mod: CSA | Performed by: STUDENT IN AN ORGANIZED HEALTH CARE EDUCATION/TRAINING PROGRAM

## 2025-07-23 RX ORDER — LABETALOL 100 MG/1
100 TABLET, FILM COATED ORAL AS NEEDED
Status: CANCELLED | OUTPATIENT
Start: 2025-07-25

## 2025-07-23 RX ORDER — FAMOTIDINE 10 MG/ML
20 INJECTION, SOLUTION INTRAVENOUS ONCE AS NEEDED
Status: DISCONTINUED | OUTPATIENT
Start: 2025-07-23 | End: 2025-07-24 | Stop reason: HOSPADM

## 2025-07-23 RX ORDER — EPINEPHRINE 1 MG/ML
0.3 INJECTION, SOLUTION, CONCENTRATE INTRAVENOUS EVERY 5 MIN PRN
Status: CANCELLED | OUTPATIENT
Start: 2025-07-25

## 2025-07-23 RX ORDER — FAMOTIDINE 10 MG/ML
20 INJECTION, SOLUTION INTRAVENOUS ONCE AS NEEDED
Status: CANCELLED | OUTPATIENT
Start: 2025-07-25

## 2025-07-23 RX ORDER — ALBUTEROL SULFATE 0.83 MG/ML
3 SOLUTION RESPIRATORY (INHALATION) AS NEEDED
Status: DISCONTINUED | OUTPATIENT
Start: 2025-07-23 | End: 2025-07-24 | Stop reason: HOSPADM

## 2025-07-23 RX ORDER — ACETAMINOPHEN 325 MG/1
975 TABLET ORAL ONCE AS NEEDED
Status: CANCELLED | OUTPATIENT
Start: 2025-07-25

## 2025-07-23 RX ORDER — ALBUTEROL SULFATE 0.83 MG/ML
3 SOLUTION RESPIRATORY (INHALATION) AS NEEDED
Status: CANCELLED | OUTPATIENT
Start: 2025-07-25

## 2025-07-23 RX ORDER — DIPHENHYDRAMINE HYDROCHLORIDE 50 MG/ML
50 INJECTION, SOLUTION INTRAMUSCULAR; INTRAVENOUS AS NEEDED
Status: CANCELLED | OUTPATIENT
Start: 2025-07-25

## 2025-07-23 RX ORDER — EPINEPHRINE 1 MG/ML
0.3 INJECTION, SOLUTION, CONCENTRATE INTRAVENOUS EVERY 5 MIN PRN
Status: DISCONTINUED | OUTPATIENT
Start: 2025-07-23 | End: 2025-07-24 | Stop reason: HOSPADM

## 2025-07-23 RX ORDER — DIPHENHYDRAMINE HYDROCHLORIDE 50 MG/ML
50 INJECTION, SOLUTION INTRAMUSCULAR; INTRAVENOUS AS NEEDED
Status: DISCONTINUED | OUTPATIENT
Start: 2025-07-23 | End: 2025-07-24 | Stop reason: HOSPADM

## 2025-07-23 RX ORDER — ONDANSETRON 4 MG/1
4 TABLET, FILM COATED ORAL ONCE AS NEEDED
Status: DISCONTINUED | OUTPATIENT
Start: 2025-07-23 | End: 2025-07-24 | Stop reason: HOSPADM

## 2025-07-23 RX ORDER — ONDANSETRON 4 MG/1
4 TABLET, FILM COATED ORAL ONCE AS NEEDED
Status: CANCELLED | OUTPATIENT
Start: 2025-07-25

## 2025-07-23 RX ORDER — DIPHENHYDRAMINE HCL 50 MG
50 CAPSULE ORAL ONCE AS NEEDED
Status: CANCELLED | OUTPATIENT
Start: 2025-07-25

## 2025-07-23 RX ADMIN — ONDANSETRON HYDROCHLORIDE 4 MG: 4 TABLET, FILM COATED ORAL at 11:31

## 2025-07-23 RX ADMIN — ESKETAMINE HYDROCHLORIDE 84 MG: 28 SOLUTION NASAL at 11:36

## 2025-07-23 ASSESSMENT — PATIENT HEALTH QUESTIONNAIRE - PHQ9
6. FEELING BAD ABOUT YOURSELF - OR THAT YOU ARE A FAILURE OR HAVE LET YOURSELF OR YOUR FAMILY DOWN: NEARLY EVERY DAY
2. FEELING DOWN, DEPRESSED OR HOPELESS: NEARLY EVERY DAY
2. FEELING DOWN, DEPRESSED OR HOPELESS: NEARLY EVERY DAY
7. TROUBLE CONCENTRATING ON THINGS, SUCH AS READING THE NEWSPAPER OR WATCHING TELEVISION: NEARLY EVERY DAY
7. TROUBLE CONCENTRATING ON THINGS, SUCH AS READING THE NEWSPAPER OR WATCHING TELEVISION: NEARLY EVERY DAY
9. THOUGHTS THAT YOU WOULD BE BETTER OFF DEAD, OR OF HURTING YOURSELF: NOT AT ALL
8. MOVING OR SPEAKING SO SLOWLY THAT OTHER PEOPLE COULD HAVE NOTICED. OR THE OPPOSITE - BEING SO FIDGETY OR RESTLESS THAT YOU HAVE BEEN MOVING AROUND A LOT MORE THAN USUAL: SEVERAL DAYS
6. FEELING BAD ABOUT YOURSELF - OR THAT YOU ARE A FAILURE OR HAVE LET YOURSELF OR YOUR FAMILY DOWN: NEARLY EVERY DAY
SUM OF ALL RESPONSES TO PHQ9 QUESTIONS 1 & 2: 6
1. LITTLE INTEREST OR PLEASURE IN DOING THINGS: NEARLY EVERY DAY
5. POOR APPETITE OR OVEREATING: NEARLY EVERY DAY
SUM OF ALL RESPONSES TO PHQ QUESTIONS 1-9: 22
8. MOVING OR SPEAKING SO SLOWLY THAT OTHER PEOPLE COULD HAVE NOTICED. OR THE OPPOSITE, BEING SO FIGETY OR RESTLESS THAT YOU HAVE BEEN MOVING AROUND A LOT MORE THAN USUAL: SEVERAL DAYS
9. THOUGHTS THAT YOU WOULD BE BETTER OFF DEAD, OR OF HURTING YOURSELF: NOT AT ALL
4. FEELING TIRED OR HAVING LITTLE ENERGY: NEARLY EVERY DAY
1. LITTLE INTEREST OR PLEASURE IN DOING THINGS: NEARLY EVERY DAY
3. TROUBLE FALLING OR STAYING ASLEEP: NEARLY EVERY DAY
5. POOR APPETITE OR OVEREATING: NEARLY EVERY DAY
4. FEELING TIRED OR HAVING LITTLE ENERGY: NEARLY EVERY DAY
10. IF YOU CHECKED OFF ANY PROBLEMS, HOW DIFFICULT HAVE THESE PROBLEMS MADE IT FOR YOU TO DO YOUR WORK, TAKE CARE OF THINGS AT HOME, OR GET ALONG WITH OTHER PEOPLE: EXTREMELY DIFFICULT
3. TROUBLE FALLING OR STAYING ASLEEP OR SLEEPING TOO MUCH: NEARLY EVERY DAY
10. IF YOU CHECKED OFF ANY PROBLEMS, HOW DIFFICULT HAVE THESE PROBLEMS MADE IT FOR YOU TO DO YOUR WORK, TAKE CARE OF THINGS AT HOME, OR GET ALONG WITH OTHER PEOPLE: EXTREMELY DIFFICULT

## 2025-07-23 ASSESSMENT — ANXIETY QUESTIONNAIRES
1. FEELING NERVOUS, ANXIOUS, OR ON EDGE: NEARLY EVERY DAY
4. TROUBLE RELAXING: NEARLY EVERY DAY
5. BEING SO RESTLESS THAT IT IS HARD TO SIT STILL: MORE THAN HALF THE DAYS
IF YOU CHECKED OFF ANY PROBLEMS ON THIS QUESTIONNAIRE, HOW DIFFICULT HAVE THESE PROBLEMS MADE IT FOR YOU TO DO YOUR WORK, TAKE CARE OF THINGS AT HOME, OR GET ALONG WITH OTHER PEOPLE: EXTREMELY DIFFICULT
7. FEELING AFRAID AS IF SOMETHING AWFUL MIGHT HAPPEN: NEARLY EVERY DAY
1. FEELING NERVOUS, ANXIOUS, OR ON EDGE: NEARLY EVERY DAY
2. NOT BEING ABLE TO STOP OR CONTROL WORRYING: NEARLY EVERY DAY
4. TROUBLE RELAXING: NEARLY EVERY DAY
7. FEELING AFRAID AS IF SOMETHING AWFUL MIGHT HAPPEN: NEARLY EVERY DAY
5. BEING SO RESTLESS THAT IT IS HARD TO SIT STILL: MORE THAN HALF THE DAYS
GAD7 TOTAL SCORE: 20
3. WORRYING TOO MUCH ABOUT DIFFERENT THINGS: NEARLY EVERY DAY
2. NOT BEING ABLE TO STOP OR CONTROL WORRYING: NEARLY EVERY DAY
6. BECOMING EASILY ANNOYED OR IRRITABLE: NEARLY EVERY DAY
6. BECOMING EASILY ANNOYED OR IRRITABLE: NEARLY EVERY DAY
3. WORRYING TOO MUCH ABOUT DIFFERENT THINGS: NEARLY EVERY DAY
IF YOU CHECKED OFF ANY PROBLEMS ON THIS QUESTIONNAIRE, HOW DIFFICULT HAVE THESE PROBLEMS MADE IT FOR YOU TO DO YOUR WORK, TAKE CARE OF THINGS AT HOME, OR GET ALONG WITH OTHER PEOPLE: EXTREMELY DIFFICULT

## 2025-07-23 ASSESSMENT — PAIN SCALES - GENERAL
PAINLEVEL_OUTOF10: 0 - NO PAIN

## 2025-07-23 ASSESSMENT — PAIN - FUNCTIONAL ASSESSMENT
PAIN_FUNCTIONAL_ASSESSMENT: 0-10

## 2025-07-23 ASSESSMENT — ENCOUNTER SYMPTOMS
OCCASIONAL FEELINGS OF UNSTEADINESS: 0
LOSS OF SENSATION IN FEET: 0
DEPRESSION: 1

## 2025-07-23 ASSESSMENT — COLUMBIA-SUICIDE SEVERITY RATING SCALE - C-SSRS
2. HAVE YOU ACTUALLY HAD ANY THOUGHTS OF KILLING YOURSELF?: NO
1. IN THE PAST MONTH, HAVE YOU WISHED YOU WERE DEAD OR WISHED YOU COULD GO TO SLEEP AND NOT WAKE UP?: NO

## 2025-07-23 NOTE — PATIENT INSTRUCTIONS
If you would like to change your Spravato location closer to your home, please call the clinics on the handout you were provided. Feel free to share our clinic phone number as we can help with the transition, if needed.Homegoing Instructions      Your Provider's Instructions:    Post-Treatment Instructions:    No alcohol for 24 hours.    Do not drive or operate machinery until the day after treatment and after a full night of sleep.    Do not make important decisions or sign legal documents for 24 hours.    A responsible adult should remain with you for 24 hours due to possible dizziness or sedation.    Resume a normal diet unless otherwise instructed.    When to Contact a Provider:    Contact your provider with any questions or concerns.    Call 911 for any emergencies.    Health Maintenance:    If you use tobacco, quitting is strongly recommended.  Resources:    American Heart Association: (782) 486-3684 or www.americanheart.org    [place] Tobacco Quit Line: 1-800-QUIT-NOW (1-212.190.3446)    [facility] of Health: www.[facility].[place].[facility]    For alcohol or drug use resources: Call [employer] at 211 or visit www.[employer].gov.    For safe medication disposal: Visit www.rxdrugdropbox.org or contact your local police department.      Instructions Before Next Spravato/Ketamine Appointment:      Arrange transportation to and from your appointment (friend, family member, or insurance-covered ride).    Avoid smoking, vaping, and nicotine use if possible.    No alcohol for 24 hours prior to your appointment.    Instructions for Day of Treatment:    Diet:  No solid food for 2 hours before appointment.    Clear liquids (water, tea, black coffee, clear juice, plain Jello, hard candy) are allowed up to 30 minutes before appointment. (No milk or cream.)    What to Bring:    Headphones for calming music or guided meditation.    Journal if desired.    Any prescribed inhalers.    Medications:    Take morning medications as  prescribed, except:    No chlordiazepoxide (Librium), diazepam (Valium), or flurazepam (Dalmane) within 24 hours before appointment.    No lamotrigine (Lamictal) within 12 hours before appointment.    Avoid anti-anxiety medications on the day of treatment unless absolutely necessary.    No stimulants prior to treatment (may resume after treatment).    Nasal sprays (for Spravato) can be used up to 1 hour before appointment.    Intentions:    Patients are encouraged to set a personal intention for each treatment session.    Cancellation Policy:    Please provide at least 24 hours’ notice for cancellations.    Three late cancellations or no-shows may require a treatment plan review with your outpatient psychiatrist before rescheduling.    If more than 15 minutes late, the appointment may need to be rescheduled.    Call [phone] to cancel or reschedule.

## 2025-07-23 NOTE — NURSING NOTE
"Patient starting having high anxity and had to be reminded that she was safe in the hospital . Patient was stating that there is a \"war going on\" and people are starving in [facility].\" Patient asked \"are there snipers?\" I told patient that she was in the hospital receiving her treatment and she is safe. Dr. Wright came into the room and staed to patient that she was safe in the hospital.  Patient stated \" I need peace.\" Patient's BP went up to 146/90 during this time. Patient's vitals went back to baseline at time of discharge. BP was 117/68. Patient was discharged without incident.   "

## 2025-07-23 NOTE — PROCEDURES
"Interventional Psychiatry Procedure    Date/Time: 7/23/2025 11:38 AM    Performed by: Gaby Wright DO  Authorized by: Gaby Wright DO      Intranasal Esketamine (SPRAVATO)    Esketamine: 84mg, Treatment #6    Subjective:  Procedure (Patient last ate (Date/Time): 7/22/25 10pm/Any concerns regarding today's treatment: No/Any concerns regarding previous treatment: No             )    Pt reports mood is \"about the same.\" Sleeping more lightly with trazodone, but otherwise sleeping/eating are stable. Some passive death wish, but denies suicidal ideation. Denies homicidal ideation. Is interested in changing her Spravato treatments to a location closer to her home - plans to ask her son to help her call the clinics when he visits tomorrow. Advised that we are happy to support her in this as needed. Pt would like to continue outpatient psychiatric treatment with Dr. Dunn.   Interval changes in health: Denies   Interval changes in medications: Denies         6/8/2025    00:00 7/2/2025 7/16/2025 7/23/2025   Scores   Patient Health Questionnaire-9 Score  22.5 24  22  22    BH LOLI-7 Total Score  20.5 20  20  20        Patient-reported    Weekly remote patient monitoring average     Objective:  Mental Status Exam  General/Appearance: no distress, appears stated age, well kept  Attitude/Behavior: Cooperative, conversant, engaged, and with good eye contact.  Motor Activity: no psychomotor agitation or retardation, no tremor or other abnormal movements, gait: Within normal limits  Mood: \"About the same\"  Affect: Dysphoric - less so than prior, Restricted range, Mood congruent, and Appropriate to content  Speech: Spontaneous, Coherent, Fluent, Soft, Appropriate rate, and Appropriate quantity  Thought Process: linear, goal directed and logical, future-oriented  Thought Content: + passive death wish, o suicidal ideation, no homicidal ideation, delusions:none   Thought Perception: no perceptual abnormalities noted  Cognition: " grossly intact  Insight: intact  Judgment: intact    Time out was taken with staff to confirm correct patient and correct procedure to be performed. Fall precautions in place throughout procedure. SpO2 continuously monitored throughout treatment. REMS eligibility confirmed.    Intranasal Esketamine was self-administered under direct RN supervision.    Vitals:    07/23/25 1108 07/23/25 1216 07/23/25 1336   BP: 120/81 119/81 117/68   Pulse: 83 73 78   Resp: 18 18 18   Temp: 36.1 °C (96.9 °F)     TempSrc: Temporal     SpO2: 97% 97% 98%         7/23/2025    11:08 7/23/2025    12:16 7/23/2025    13:36   RASS   Andrews Agitation Sedation Scale (RASS) 0 1 0       Patient tolerated treatment well and effects of medications had resolved when reassessed prior to discharge. No complications. Patient was continuously monitored for 120 minutes. Discharge instructions were provided to patient with After Visit Summary. Patient was discharged home in the care of their designated .    Assessment:  1. Current severe episode of major depressive disorder without psychotic features without prior episode (Multi)      Currently not at goal  No immediate risk of harm to self or others    Plan:  Continue Intranasal Esketamine. Follow up in about 2 days (around 7/25/2025). Continue current antidepressant and other psychotropic medication regimen prescribed by outpatient provider and other mental health treatment with Dr. Dunn. At prior appt, pt was provided with a list of 3 treatment centers closer to her home - pt advised to coordinate with [facility] if would like to change treatment center.         Gaby Wright DO

## 2025-07-25 ENCOUNTER — HOSPITAL ENCOUNTER (OUTPATIENT)
Facility: HOSPITAL | Age: 55
Discharge: HOME | End: 2025-07-25
Payer: OTHER MISCELLANEOUS

## 2025-07-25 VITALS
DIASTOLIC BLOOD PRESSURE: 75 MMHG | RESPIRATION RATE: 18 BRPM | SYSTOLIC BLOOD PRESSURE: 111 MMHG | OXYGEN SATURATION: 98 % | HEART RATE: 79 BPM | TEMPERATURE: 97.7 F

## 2025-07-25 DIAGNOSIS — F32.2 CURRENT SEVERE EPISODE OF MAJOR DEPRESSIVE DISORDER WITHOUT PSYCHOTIC FEATURES WITHOUT PRIOR EPISODE (MULTI): Primary | ICD-10-CM

## 2025-07-25 DIAGNOSIS — F43.10 PTSD (POST-TRAUMATIC STRESS DISORDER): ICD-10-CM

## 2025-07-25 RX ORDER — DIPHENHYDRAMINE HYDROCHLORIDE 50 MG/ML
50 INJECTION, SOLUTION INTRAMUSCULAR; INTRAVENOUS AS NEEDED
Status: DISCONTINUED | OUTPATIENT
Start: 2025-07-25 | End: 2025-07-26 | Stop reason: HOSPADM

## 2025-07-25 RX ORDER — LABETALOL 100 MG/1
100 TABLET, FILM COATED ORAL AS NEEDED
Status: DISCONTINUED | OUTPATIENT
Start: 2025-07-25 | End: 2025-07-26 | Stop reason: HOSPADM

## 2025-07-25 RX ORDER — ALBUTEROL SULFATE 0.83 MG/ML
3 SOLUTION RESPIRATORY (INHALATION) AS NEEDED
OUTPATIENT
Start: 2025-07-30

## 2025-07-25 RX ORDER — ACETAMINOPHEN 325 MG/1
975 TABLET ORAL ONCE AS NEEDED
Status: DISCONTINUED | OUTPATIENT
Start: 2025-07-25 | End: 2025-07-26 | Stop reason: HOSPADM

## 2025-07-25 RX ORDER — ALBUTEROL SULFATE 0.83 MG/ML
3 SOLUTION RESPIRATORY (INHALATION) AS NEEDED
Status: DISCONTINUED | OUTPATIENT
Start: 2025-07-25 | End: 2025-07-26 | Stop reason: HOSPADM

## 2025-07-25 RX ORDER — ACETAMINOPHEN 325 MG/1
975 TABLET ORAL ONCE AS NEEDED
OUTPATIENT
Start: 2025-07-30

## 2025-07-25 RX ORDER — DIPHENHYDRAMINE HCL 50 MG
50 CAPSULE ORAL ONCE AS NEEDED
Status: DISCONTINUED | OUTPATIENT
Start: 2025-07-25 | End: 2025-07-26 | Stop reason: HOSPADM

## 2025-07-25 RX ORDER — EPINEPHRINE 1 MG/ML
0.3 INJECTION, SOLUTION, CONCENTRATE INTRAVENOUS EVERY 5 MIN PRN
OUTPATIENT
Start: 2025-07-30

## 2025-07-25 RX ORDER — EPINEPHRINE 1 MG/ML
0.3 INJECTION, SOLUTION, CONCENTRATE INTRAVENOUS EVERY 5 MIN PRN
Status: DISCONTINUED | OUTPATIENT
Start: 2025-07-25 | End: 2025-07-26 | Stop reason: HOSPADM

## 2025-07-25 RX ORDER — ONDANSETRON 4 MG/1
4 TABLET, FILM COATED ORAL ONCE AS NEEDED
Status: DISCONTINUED | OUTPATIENT
Start: 2025-07-25 | End: 2025-07-26 | Stop reason: HOSPADM

## 2025-07-25 RX ORDER — ONDANSETRON 4 MG/1
4 TABLET, FILM COATED ORAL ONCE AS NEEDED
OUTPATIENT
Start: 2025-07-30

## 2025-07-25 RX ORDER — LABETALOL 100 MG/1
100 TABLET, FILM COATED ORAL AS NEEDED
OUTPATIENT
Start: 2025-07-30

## 2025-07-25 RX ORDER — FAMOTIDINE 10 MG/ML
20 INJECTION, SOLUTION INTRAVENOUS ONCE AS NEEDED
OUTPATIENT
Start: 2025-07-30

## 2025-07-25 RX ORDER — DIPHENHYDRAMINE HYDROCHLORIDE 50 MG/ML
50 INJECTION, SOLUTION INTRAMUSCULAR; INTRAVENOUS AS NEEDED
OUTPATIENT
Start: 2025-07-30

## 2025-07-25 RX ORDER — FAMOTIDINE 10 MG/ML
20 INJECTION, SOLUTION INTRAVENOUS ONCE AS NEEDED
Status: DISCONTINUED | OUTPATIENT
Start: 2025-07-25 | End: 2025-07-26 | Stop reason: HOSPADM

## 2025-07-25 RX ORDER — DIPHENHYDRAMINE HCL 50 MG
50 CAPSULE ORAL ONCE AS NEEDED
OUTPATIENT
Start: 2025-07-30

## 2025-07-25 ASSESSMENT — PAIN SCALES - GENERAL
PAINLEVEL_OUTOF10: 0 - NO PAIN

## 2025-07-25 ASSESSMENT — ANXIETY QUESTIONNAIRES
6. BECOMING EASILY ANNOYED OR IRRITABLE: NEARLY EVERY DAY
7. FEELING AFRAID AS IF SOMETHING AWFUL MIGHT HAPPEN: NEARLY EVERY DAY
IF YOU CHECKED OFF ANY PROBLEMS ON THIS QUESTIONNAIRE, HOW DIFFICULT HAVE THESE PROBLEMS MADE IT FOR YOU TO DO YOUR WORK, TAKE CARE OF THINGS AT HOME, OR GET ALONG WITH OTHER PEOPLE: EXTREMELY DIFFICULT
2. NOT BEING ABLE TO STOP OR CONTROL WORRYING: NEARLY EVERY DAY
IF YOU CHECKED OFF ANY PROBLEMS ON THIS QUESTIONNAIRE, HOW DIFFICULT HAVE THESE PROBLEMS MADE IT FOR YOU TO DO YOUR WORK, TAKE CARE OF THINGS AT HOME, OR GET ALONG WITH OTHER PEOPLE: EXTREMELY DIFFICULT
1. FEELING NERVOUS, ANXIOUS, OR ON EDGE: NEARLY EVERY DAY
GAD7 TOTAL SCORE: 19
1. FEELING NERVOUS, ANXIOUS, OR ON EDGE: NEARLY EVERY DAY
6. BECOMING EASILY ANNOYED OR IRRITABLE: NEARLY EVERY DAY
5. BEING SO RESTLESS THAT IT IS HARD TO SIT STILL: SEVERAL DAYS
4. TROUBLE RELAXING: NEARLY EVERY DAY
3. WORRYING TOO MUCH ABOUT DIFFERENT THINGS: NEARLY EVERY DAY
4. TROUBLE RELAXING: NEARLY EVERY DAY
2. NOT BEING ABLE TO STOP OR CONTROL WORRYING: NEARLY EVERY DAY
5. BEING SO RESTLESS THAT IT IS HARD TO SIT STILL: SEVERAL DAYS
7. FEELING AFRAID AS IF SOMETHING AWFUL MIGHT HAPPEN: NEARLY EVERY DAY
3. WORRYING TOO MUCH ABOUT DIFFERENT THINGS: NEARLY EVERY DAY

## 2025-07-25 ASSESSMENT — PATIENT HEALTH QUESTIONNAIRE - PHQ9
SUM OF ALL RESPONSES TO PHQ9 QUESTIONS 1 & 2: 6
7. TROUBLE CONCENTRATING ON THINGS, SUCH AS READING THE NEWSPAPER OR WATCHING TELEVISION: NEARLY EVERY DAY
4. FEELING TIRED OR HAVING LITTLE ENERGY: NEARLY EVERY DAY
10. IF YOU CHECKED OFF ANY PROBLEMS, HOW DIFFICULT HAVE THESE PROBLEMS MADE IT FOR YOU TO DO YOUR WORK, TAKE CARE OF THINGS AT HOME, OR GET ALONG WITH OTHER PEOPLE: EXTREMELY DIFFICULT
SUM OF ALL RESPONSES TO PHQ QUESTIONS 1-9: 22
2. FEELING DOWN, DEPRESSED OR HOPELESS: NEARLY EVERY DAY
5. POOR APPETITE OR OVEREATING: NEARLY EVERY DAY
3. TROUBLE FALLING OR STAYING ASLEEP OR SLEEPING TOO MUCH: NEARLY EVERY DAY
8. MOVING OR SPEAKING SO SLOWLY THAT OTHER PEOPLE COULD HAVE NOTICED. OR THE OPPOSITE, BEING SO FIGETY OR RESTLESS THAT YOU HAVE BEEN MOVING AROUND A LOT MORE THAN USUAL: SEVERAL DAYS
9. THOUGHTS THAT YOU WOULD BE BETTER OFF DEAD, OR OF HURTING YOURSELF: NOT AT ALL
4. FEELING TIRED OR HAVING LITTLE ENERGY: NEARLY EVERY DAY
7. TROUBLE CONCENTRATING ON THINGS, SUCH AS READING THE NEWSPAPER OR WATCHING TELEVISION: NEARLY EVERY DAY
9. THOUGHTS THAT YOU WOULD BE BETTER OFF DEAD, OR OF HURTING YOURSELF: NOT AT ALL
8. MOVING OR SPEAKING SO SLOWLY THAT OTHER PEOPLE COULD HAVE NOTICED. OR THE OPPOSITE - BEING SO FIDGETY OR RESTLESS THAT YOU HAVE BEEN MOVING AROUND A LOT MORE THAN USUAL: SEVERAL DAYS
1. LITTLE INTEREST OR PLEASURE IN DOING THINGS: NEARLY EVERY DAY
6. FEELING BAD ABOUT YOURSELF - OR THAT YOU ARE A FAILURE OR HAVE LET YOURSELF OR YOUR FAMILY DOWN: NEARLY EVERY DAY
10. IF YOU CHECKED OFF ANY PROBLEMS, HOW DIFFICULT HAVE THESE PROBLEMS MADE IT FOR YOU TO DO YOUR WORK, TAKE CARE OF THINGS AT HOME, OR GET ALONG WITH OTHER PEOPLE: EXTREMELY DIFFICULT
5. POOR APPETITE OR OVEREATING: NEARLY EVERY DAY
6. FEELING BAD ABOUT YOURSELF - OR THAT YOU ARE A FAILURE OR HAVE LET YOURSELF OR YOUR FAMILY DOWN: NEARLY EVERY DAY
2. FEELING DOWN, DEPRESSED OR HOPELESS: NEARLY EVERY DAY
3. TROUBLE FALLING OR STAYING ASLEEP: NEARLY EVERY DAY
1. LITTLE INTEREST OR PLEASURE IN DOING THINGS: NEARLY EVERY DAY

## 2025-07-25 ASSESSMENT — ENCOUNTER SYMPTOMS
DEPRESSION: 1
OCCASIONAL FEELINGS OF UNSTEADINESS: 0
LOSS OF SENSATION IN FEET: 0

## 2025-07-25 ASSESSMENT — PAIN - FUNCTIONAL ASSESSMENT
PAIN_FUNCTIONAL_ASSESSMENT: 0-10

## 2025-07-25 NOTE — PATIENT INSTRUCTIONS
Homegoing Instructions      Your Provider's Instructions:    Post-Treatment Instructions:    No alcohol for 24 hours.    Do not drive or operate machinery until the day after treatment and after a full night of sleep.    Do not make important decisions or sign legal documents for 24 hours.    A responsible adult should remain with you for 24 hours due to possible dizziness or sedation.    Resume a normal diet unless otherwise instructed.    When to Contact a Provider:    Contact your provider with any questions or concerns.    Call 911 for any emergencies.    Health Maintenance:    If you use tobacco, quitting is strongly recommended.  Resources:    American Heart Association: (682) 359-1879 or www.americanheart.org    [place] Tobacco Quit Line: 1-800-QUIT-NOW (1-359.281.1588)    [facility] [place] of Health: www.[facility].[place].gov    For alcohol or drug use resources: Call [employer] at 211 or visit www.[place].gov.    For safe medication disposal: Visit www.rxdrugdropbox.org or contact your local police department.      Instructions Before Next Spravato/Ketamine Appointment:      Arrange transportation to and from your appointment (friend, family member, or insurance-covered ride).    Avoid smoking, vaping, and nicotine use if possible.    No alcohol for 24 hours prior to your appointment.    Instructions for Day of Treatment:    Diet:  No solid food for 2 hours before appointment.    Clear liquids (water, tea, black coffee, clear juice, plain Jello, hard candy) are allowed up to 30 minutes before appointment. (No milk or cream.)    What to Bring:    Headphones for calming music or guided meditation.    Journal if desired.    Any prescribed inhalers.    Medications:    Take morning medications as prescribed, except:    No chlordiazepoxide (Librium), diazepam (Valium), or flurazepam (Dalmane) within 24 hours before appointment.    No lamotrigine (Lamictal) within 12 hours before appointment.    Avoid anti-anxiety  medications on the day of treatment unless absolutely necessary.    No stimulants prior to treatment (may resume after treatment).    Nasal sprays (for Spravato) can be used up to 1 hour before appointment.    Intentions:    Patients are encouraged to set a personal intention for each treatment session.    Cancellation Policy:    Please provide at least 24 hours’ notice for cancellations.    Three late cancellations or no-shows may require a treatment plan review with your outpatient psychiatrist before rescheduling.    If more than 15 minutes late, the appointment may need to be rescheduled.    Call [phone] to cancel or reschedule.

## 2025-07-25 NOTE — PROCEDURES
"Interventional Psychiatry    Date/Time: 7/25/2025 11:15 AM    Performed by: Wesley Dunn MD PhD  Authorized by: Wesley Dunn MD PhD        Intranasal Esketamine (SPRAVATO)    Esketamine: 56mg, twice a week    Subjective:  Procedure (Patient last ate (Date/Time): 7/24/25 9pm/Any concerns regarding today's treatment: No////Any concerns regarding previous treatment: No             )    She said she felt ok and did not have improvement. Still felt very depressed and very anxious. Still felt lack of  motivation and pleasure and had severe sx of PTSD. She said she did not like 84 mg SPRAVATO and stated that she felt back to war zone last time when she received 84 mg treatment. Still had problem with energy, concentration, and appetite. Still felt hopeless and helpless on and off. Denied having SI/HI/AVH or paranoia. Still has nightmares and flashbacks. She said she could not continue SPRAVATO treatment here from next week because she used all transportation covered by her insurance. She said she will ask her  to work with the [employer] to obtain transportation assistance for her SPRAVATO treatment. She said she may have to find a treatment place near where she lives for future SPRAVATO treatment.       Interval changes in health: Denies   Interval changes in medications: Denies         6/8/2025    00:00 7/2/2025 7/16/2025 7/20/2025    00:00   Scores   Patient Health Questionnaire-9 Score  22.5 24  22   22   BH LOLI-7 Total Score  20.5 20  20   19.5       Patient-reported    Weekly remote patient monitoring average     Objective:  Mental Status Exam  General/Appearance: no distress, appears stated age, well kept  Attitude/Behavior: Cooperative, conversant, engaged, and with good eye contact.  Motor Activity: no psychomotor agitation or retardation, no tremor or other abnormal movements, gait: Within normal limits  Mood: \"ok, no change\"  Affect: Dysphoric, Blunted range, Mood congruent, and Appropriate to " content  Speech: Spontaneous, Coherent, Appropriate volume, Soft, Slow, and Appropriate quantity  Thought Process: linear, goal directed  Thought Content: no suicidal ideation, delusions:none   Thought Perception: no perceptual abnormalities noted  Cognition: grossly intact  Insight: intact  Judgment: intact    Time out was taken with staff to confirm correct patient and correct procedure to be performed. Fall precautions in place throughout procedure. SpO2 continuously monitored throughout treatment. REMS eligibility confirmed.    Intranasal Esketamine was self-administered under direct RN supervision.    Vitals:    07/25/25 1125 07/25/25 1227 07/25/25 1347   BP: 115/75 119/75 111/75   Pulse: 85 73 79   Resp: 18 18 18   Temp: 36.5 °C (97.7 °F)     SpO2: 96% 96% 98%         7/25/2025    11:25 7/25/2025    12:27 7/25/2025    13:47   RASS   Andrews Agitation Sedation Scale (RASS) 0 0 0       Patient tolerated treatment well and effects of medications including floaty and drowsy  had weakened when reassessed at about 40 minutes post-dosing. No complications. Patient was continuously monitored for 120 minutes. Discharge instructions were provided to patient with After Visit Summary. Patient was discharged home in the care of their designated .    Assessment:  1. Current severe episode of major depressive disorder without psychotic features without prior episode (Multi)      Currently not at goal  No immediate risk of harm to self or others    Plan:  Continue Intranasal Esketamine if she is able to arrange transportation. Follow up in about 3 days (around 7/28/2025). Continue current antidepressant and other psychotropic medication regimen prescribed by outpatient provider and other mental health treatment with her therapist.         Wesley Dunn MD PhD

## 2025-07-29 ENCOUNTER — APPOINTMENT (OUTPATIENT)
Dept: UROLOGY | Facility: CLINIC | Age: 55
End: 2025-07-29
Payer: MEDICARE

## 2025-07-31 ENCOUNTER — TELEPHONE (OUTPATIENT)
Dept: OBSTETRICS AND GYNECOLOGY | Facility: CLINIC | Age: 55
End: 2025-07-31
Payer: MEDICARE

## 2025-07-31 NOTE — TELEPHONE ENCOUNTER
Pt called in stating does not do well with MRI's and need something to calm her  please send RX to [facility] in [place]

## 2025-08-01 ENCOUNTER — OFFICE VISIT (OUTPATIENT)
Dept: UROLOGY | Facility: CLINIC | Age: 55
End: 2025-08-01
Payer: MEDICARE

## 2025-08-01 VITALS
WEIGHT: 145 LBS | DIASTOLIC BLOOD PRESSURE: 77 MMHG | HEIGHT: 63 IN | BODY MASS INDEX: 25.69 KG/M2 | SYSTOLIC BLOOD PRESSURE: 115 MMHG | HEART RATE: 67 BPM

## 2025-08-01 DIAGNOSIS — N32.81 OAB (OVERACTIVE BLADDER): ICD-10-CM

## 2025-08-01 DIAGNOSIS — N95.8 GENITOURINARY SYNDROME OF MENOPAUSE: ICD-10-CM

## 2025-08-01 DIAGNOSIS — N39.3 SUI (STRESS URINARY INCONTINENCE, FEMALE): Primary | ICD-10-CM

## 2025-08-01 PROCEDURE — 3008F BODY MASS INDEX DOCD: CPT | Performed by: STUDENT IN AN ORGANIZED HEALTH CARE EDUCATION/TRAINING PROGRAM

## 2025-08-01 PROCEDURE — 99214 OFFICE O/P EST MOD 30 MIN: CPT | Performed by: STUDENT IN AN ORGANIZED HEALTH CARE EDUCATION/TRAINING PROGRAM

## 2025-08-01 NOTE — PROGRESS NOTES
"HISTORY OF PRESENT ILLNESS:  Suyapa is a 54 y.o. female who presents today for a follow up visit status post sling on 2/28/25. She is doing well, but finds she needs to push more to urinate sometimes in the mornings.         Past Medical History  She has a past medical history of ADHD (attention deficit hyperactivity disorder), Adjustment disorder, Anxiety, Cervical disc disease (2014), Chronic pain disorder, COPD (chronic obstructive pulmonary disease) (Multi), Depression, Hallucination, Hyperlipidemia, Memory loss, Panic attack, Post-traumatic stress disorder, unspecified (08/02/2022), Psychiatric illness, PTSD (post-traumatic stress disorder), and Sleep difficulties.    Surgical History  She has a past surgical history that includes Other surgical history (03/29/2022); Other surgical history (03/29/2022); Augmentation mammaplasty (1995); and Incontinence surgery.     Social History  She reports that she has been smoking cigarettes. She has never been exposed to tobacco smoke. She has quit using smokeless tobacco. She reports that she does not currently use alcohol. She reports that she does not use drugs.    Family History  Family History[1]     Allergies  Patient has no known allergies.      A comprehensive 10+ review of systems was negative except for: see hpi                          PHYSICAL EXAMINATION:  BP Readings from Last 3 Encounters:   08/01/25 115/77   06/25/25 130/62   06/03/25 118/70      Wt Readings from Last 3 Encounters:   08/01/25 65.8 kg (145 lb)   06/25/25 62.1 kg (137 lb)   06/24/25 62.6 kg (138 lb)      BMI: Estimated body mass index is 25.69 kg/m² as calculated from the following:    Height as of this encounter: 1.6 m (5' 3\").    Weight as of this encounter: 65.8 kg (145 lb).  BSA: Estimated body surface area is 1.71 meters squared as calculated from the following:    Height as of this encounter: 1.6 m (5' 3\").    Weight as of this encounter: 65.8 kg (145 lb).  HEENT: Normocephalic, " atraumatic, PER EOMI, nonicteric, trachea normal, thyroid normal, oropharynx normal.  CARDIAC: regular rate & rhythm, S1 & S2 normal.  No heaves, thrills, gallops or murmurs.  LUNGS: Clear to auscultation, no spinal or CV tenderness.  EXTREMITIES: No evidence of cyanosis, clubbing or edema.               Assessment:  54 y.o. with mixed incontinence      MICHAEL  -She has already tried oxybutynin and Myrbetriq without improvement  -UDS + for TEETEE, DO   -S/P botox, 100 units, 1/21/24, doing well   -S/P sling on 2/28/25, doing well, no restrictions   -f/up in 3 months for botox check       GUSM:  -Continue Vagifem  -continue estradiol       Follow up in 3 months       All questions and concerns were answered and addressed.  The patient expressed understanding and agrees with the plan.     Jorje Nolasco MD    Scribe Attestation  By signing my name below, I, Abhishekmo Alan, Scribe   attest that this documentation has been prepared under the direction and in the presence of Jorje Nolasco MD.         [1]   Family History  Problem Relation Name Age of Onset    Hypertension Mother Ayla thao     Heart disease Father Abel Thao     Diabetes Father Abel Thao     Hypertension Father Abel Thao

## 2025-08-25 ENCOUNTER — HOSPITAL ENCOUNTER (OUTPATIENT)
Dept: RADIOLOGY | Facility: HOSPITAL | Age: 55
Discharge: HOME | End: 2025-08-25
Payer: MEDICARE

## 2025-08-25 DIAGNOSIS — N80.03 ADENOMYOSIS OF THE UTERUS: ICD-10-CM

## 2025-08-25 DIAGNOSIS — D18.1 LYMPHANGIOMA, ANY SITE: ICD-10-CM

## 2025-08-25 PROCEDURE — A9575 INJ GADOTERATE MEGLUMI 0.1ML: HCPCS | Performed by: OBSTETRICS & GYNECOLOGY

## 2025-08-25 PROCEDURE — 2550000001 HC RX 255 CONTRASTS: Performed by: OBSTETRICS & GYNECOLOGY

## 2025-08-25 PROCEDURE — 72197 MRI PELVIS W/O & W/DYE: CPT

## 2025-08-25 RX ORDER — GADOTERATE MEGLUMINE 376.9 MG/ML
0.2 INJECTION INTRAVENOUS
Status: COMPLETED | OUTPATIENT
Start: 2025-08-25 | End: 2025-08-25

## 2025-08-25 RX ADMIN — GADOTERATE MEGLUMINE 12.5 ML: 376.9 INJECTION INTRAVENOUS at 15:31

## 2025-08-27 ENCOUNTER — RESULTS FOLLOW-UP (OUTPATIENT)
Dept: OBSTETRICS AND GYNECOLOGY | Facility: CLINIC | Age: 55
End: 2025-08-27

## 2025-08-27 ENCOUNTER — APPOINTMENT (OUTPATIENT)
Dept: PRIMARY CARE | Facility: CLINIC | Age: 55
End: 2025-08-27
Payer: MEDICARE

## 2025-08-27 VITALS
TEMPERATURE: 97.9 F | OXYGEN SATURATION: 93 % | RESPIRATION RATE: 18 BRPM | SYSTOLIC BLOOD PRESSURE: 103 MMHG | BODY MASS INDEX: 24.63 KG/M2 | HEART RATE: 67 BPM | HEIGHT: 63 IN | WEIGHT: 139 LBS | DIASTOLIC BLOOD PRESSURE: 71 MMHG

## 2025-08-27 DIAGNOSIS — E04.1 NODULAR THYROID DISEASE: ICD-10-CM

## 2025-08-27 DIAGNOSIS — E78.5 HYPERLIPIDEMIA, UNSPECIFIED HYPERLIPIDEMIA TYPE: ICD-10-CM

## 2025-08-27 DIAGNOSIS — R79.89 LOW SERUM VITAMIN D: ICD-10-CM

## 2025-08-27 DIAGNOSIS — Z12.2 SCREENING FOR LUNG CANCER: ICD-10-CM

## 2025-08-27 DIAGNOSIS — F43.10 PTSD (POST-TRAUMATIC STRESS DISORDER): ICD-10-CM

## 2025-08-27 DIAGNOSIS — Z87.891 SMOKER WITHIN LAST 12 MONTHS: ICD-10-CM

## 2025-08-27 DIAGNOSIS — R41.3 MEMORY LOSS: Primary | ICD-10-CM

## 2025-08-27 DIAGNOSIS — R20.0 NUMBNESS OF FINGERS OF BOTH HANDS: ICD-10-CM

## 2025-08-27 DIAGNOSIS — E53.8 LOW SERUM VITAMIN B12: ICD-10-CM

## 2025-08-27 DIAGNOSIS — F17.200 TOBACCO DEPENDENCY: ICD-10-CM

## 2025-08-27 DIAGNOSIS — R73.03 PREDIABETES: ICD-10-CM

## 2025-08-27 DIAGNOSIS — F32.2 CURRENT SEVERE EPISODE OF MAJOR DEPRESSIVE DISORDER WITHOUT PSYCHOTIC FEATURES WITHOUT PRIOR EPISODE (MULTI): ICD-10-CM

## 2025-08-27 PROCEDURE — 99214 OFFICE O/P EST MOD 30 MIN: CPT | Performed by: INTERNAL MEDICINE

## 2025-08-27 PROCEDURE — 4004F PT TOBACCO SCREEN RCVD TLK: CPT | Performed by: INTERNAL MEDICINE

## 2025-08-27 PROCEDURE — 96372 THER/PROPH/DIAG INJ SC/IM: CPT | Performed by: INTERNAL MEDICINE

## 2025-08-27 PROCEDURE — 3008F BODY MASS INDEX DOCD: CPT | Performed by: INTERNAL MEDICINE

## 2025-08-27 RX ORDER — ROSUVASTATIN CALCIUM 5 MG/1
5 TABLET, COATED ORAL DAILY
Qty: 90 TABLET | Refills: 1 | Status: SHIPPED | OUTPATIENT
Start: 2025-08-27

## 2025-08-27 RX ORDER — BLOOD-GLUCOSE SENSOR
EACH MISCELLANEOUS
Qty: 3 EACH | Refills: 3 | Status: SHIPPED | OUTPATIENT
Start: 2025-08-27 | End: 2025-08-27

## 2025-08-27 RX ORDER — CYANOCOBALAMIN 1000 UG/ML
1000 INJECTION, SOLUTION INTRAMUSCULAR; SUBCUTANEOUS ONCE
Status: COMPLETED | OUTPATIENT
Start: 2025-08-27 | End: 2025-08-27

## 2025-08-27 RX ORDER — BLOOD-GLUCOSE SENSOR
EACH MISCELLANEOUS
Qty: 2 EACH | Refills: 3 | Status: SHIPPED | OUTPATIENT
Start: 2025-08-27

## 2025-08-27 RX ADMIN — CYANOCOBALAMIN 1000 MCG: 1000 INJECTION, SOLUTION INTRAMUSCULAR; SUBCUTANEOUS at 15:39

## 2025-08-27 SDOH — ECONOMIC STABILITY: FOOD INSECURITY: WITHIN THE PAST 12 MONTHS, YOU WORRIED THAT YOUR FOOD WOULD RUN OUT BEFORE YOU GOT MONEY TO BUY MORE.: NEVER TRUE

## 2025-08-27 SDOH — ECONOMIC STABILITY: FOOD INSECURITY: WITHIN THE PAST 12 MONTHS, THE FOOD YOU BOUGHT JUST DIDN'T LAST AND YOU DIDN'T HAVE MONEY TO GET MORE.: NEVER TRUE

## 2025-08-27 ASSESSMENT — LIFESTYLE VARIABLES
SKIP TO QUESTIONS 9-10: 1
HOW OFTEN DO YOU HAVE A DRINK CONTAINING ALCOHOL: MONTHLY OR LESS
AUDIT-C TOTAL SCORE: 1
SKIP TO QUESTIONS 9-10: 1
AUDIT-C TOTAL SCORE: 1
HOW OFTEN DO YOU HAVE SIX OR MORE DRINKS ON ONE OCCASION: NEVER
HOW MANY STANDARD DRINKS CONTAINING ALCOHOL DO YOU HAVE ON A TYPICAL DAY: 1 OR 2
HOW OFTEN DO YOU HAVE A DRINK CONTAINING ALCOHOL: MONTHLY OR LESS
HOW MANY STANDARD DRINKS CONTAINING ALCOHOL DO YOU HAVE ON A TYPICAL DAY: 1 OR 2
HOW OFTEN DO YOU HAVE SIX OR MORE DRINKS ON ONE OCCASION: NEVER

## 2025-08-28 ENCOUNTER — TELEPHONE (OUTPATIENT)
Dept: PRIMARY CARE | Facility: CLINIC | Age: 55
End: 2025-08-28
Payer: MEDICARE

## 2025-11-11 ENCOUNTER — APPOINTMENT (OUTPATIENT)
Dept: UROLOGY | Facility: CLINIC | Age: 55
End: 2025-11-11
Payer: MEDICARE

## 2026-03-02 ENCOUNTER — APPOINTMENT (OUTPATIENT)
Dept: PRIMARY CARE | Facility: CLINIC | Age: 56
End: 2026-03-02
Payer: MEDICARE

## 2026-05-14 ENCOUNTER — APPOINTMENT (OUTPATIENT)
Dept: PRIMARY CARE | Facility: CLINIC | Age: 56
End: 2026-05-14
Payer: MEDICARE

## 2026-07-02 ENCOUNTER — APPOINTMENT (OUTPATIENT)
Dept: OBSTETRICS AND GYNECOLOGY | Facility: CLINIC | Age: 56
End: 2026-07-02
Payer: MEDICARE

## (undated) DEVICE — ANTIFOG, SOLUTION, FOG-OUT

## (undated) DEVICE — COVER, TABLE, 44 X 75 IN, DISPOSABLE, LF, STERILE

## (undated) DEVICE — PREP TRAY, SKIN, DRY, W/GLOVES

## (undated) DEVICE — IRRIGATION SET, CYSTOSCOPY, REGULATING CLAMP, STRAIGHT, 81 IN

## (undated) DEVICE — TUBING, SUCTION, CONNECTING, STERILE 0.25 X 120 IN., LF

## (undated) DEVICE — Device

## (undated) DEVICE — COVER HANDLE LIGHT, STERIS, BLUE, STERILE

## (undated) DEVICE — DRESSING, ADHESIVE, ISLAND, TELFA, 2 X 3.75 IN, LF

## (undated) DEVICE — SPONGE, GAUZE, XRAY DECT, 16 PLY, 4 X 4, W/MASTER DMT,STERILE

## (undated) DEVICE — TRAY, SURESTEP, URINE METER, 16FR, COMPLETE, W/STATLOCK

## (undated) DEVICE — MANIFOLD, 4 PORT NEPTUNE STANDARD

## (undated) DEVICE — GLOVE, SURGICAL, PROTEXIS PI , 6.5, PF, LF

## (undated) DEVICE — POSITIONING KIT, PINK PAD XL, ADVANCED TRENDELENBURG

## (undated) DEVICE — GLOVE, SURGICAL, PROTEXIS PI BLUE W/NEUTHERA, 7.0, PF, LF

## (undated) DEVICE — SUTURE, VICRYL, 0, 27 IN, CT-2, UNDYED

## (undated) DEVICE — SYRINGE, 60 CC, IRRIGATION, BULB, CONTRO-BULB, PAPER POUCH

## (undated) DEVICE — DRESSING, TRANSPARENT, TEGADERM, 2-3/8 X 2-3/4 IN

## (undated) DEVICE — TOWEL, SURGICAL, NEURO, O/R, 16 X 26, BLUE, STERILE

## (undated) DEVICE — DENTITION PROTECTOR, QUICKGUARD, NON-PERF

## (undated) DEVICE — STAY SET, SURGICAL , 5MM SHARP HOOK, CS/ 50

## (undated) DEVICE — GOWN, ASTOUND, L

## (undated) DEVICE — GLOVE, PROTEXIS PI CLASSIC, SZ-7.5, PF, LF

## (undated) DEVICE — DRESSING, ISLAND, TELFA, 4 X 5 IN

## (undated) DEVICE — SUTURE, MONOCRYL, 4-0, 18 IN, PS2, UNDYED

## (undated) DEVICE — COVER, MAYO STAND, W/PAD, 23 IN, DISPOSABLE, PLASTIC, LF, STERILE

## (undated) DEVICE — LUBRICANT, WATER SOLUBLE, BACTERIOSTATIC, 2 OZ, STERILE

## (undated) DEVICE — PREP, SCRUB, SKIN, FOAM, HIBICLENS, 4 OZ

## (undated) DEVICE — SYRINGE, HYPODERMIC, LUER LOCK, 6 CC

## (undated) DEVICE — TIP, SUCTION, YANKAUER, BULB, OPEN TIP, W/O CONTROL VENT, LF, CLEAR

## (undated) DEVICE — DRESSING, GAUZE, SPONGE, 8 PLY, CURITY, 2 X 2 IN, STERILE

## (undated) DEVICE — DRAPE PACK, LAVH, W/ATTACHED LEGGINGS, W/POUCH, 100 X 114 IN, LF, STERILE

## (undated) DEVICE — COUNTER, NEEDLE, FOAM BLOCK, W/MAGNET, W/BLADE GUARD, 10 COUNT, RED, LF

## (undated) DEVICE — SOLUTION, IRRIGATION, SODIUM CHLORIDE 0.9%, 1000 ML, POUR BOTTLE

## (undated) DEVICE — RETRACTOR, SURGICAL, RING, PLASTIC, DISPOSABLE